# Patient Record
Sex: FEMALE | Race: WHITE | NOT HISPANIC OR LATINO | Employment: OTHER | URBAN - METROPOLITAN AREA
[De-identification: names, ages, dates, MRNs, and addresses within clinical notes are randomized per-mention and may not be internally consistent; named-entity substitution may affect disease eponyms.]

---

## 2017-01-09 ENCOUNTER — OFFICE VISIT (OUTPATIENT)
Dept: INTERNAL MEDICINE | Facility: CLINIC | Age: 67
End: 2017-01-09

## 2017-01-09 VITALS
BODY MASS INDEX: 30.39 KG/M2 | DIASTOLIC BLOOD PRESSURE: 62 MMHG | SYSTOLIC BLOOD PRESSURE: 104 MMHG | WEIGHT: 178 LBS | HEIGHT: 64 IN

## 2017-01-09 DIAGNOSIS — J30.9 ALLERGIC SINUSITIS: Primary | ICD-10-CM

## 2017-01-09 PROCEDURE — 99213 OFFICE O/P EST LOW 20 MIN: CPT | Performed by: INTERNAL MEDICINE

## 2017-01-09 RX ORDER — AZELASTINE HCL 205.5 UG/1
2 SPRAY NASAL 2 TIMES DAILY
Qty: 30 ML | Refills: 11 | Status: SHIPPED | OUTPATIENT
Start: 2017-01-09 | End: 2019-03-11

## 2017-01-09 RX ORDER — PREDNISONE 10 MG/1
TABLET ORAL
Qty: 20 TABLET | Refills: 0 | Status: SHIPPED | OUTPATIENT
Start: 2017-01-09 | End: 2017-01-30

## 2017-01-09 NOTE — PATIENT INSTRUCTIONS
Allergic sinusitis - will continue Flonase, Montelukast and Levocetirizine. Add Astepro nasal spray twice a day and round of steroids. Possible side effects explained. Advised to get flu vaccine at [facility] or [facility].

## 2017-01-09 NOTE — MR AVS SNAPSHOT
Mary Cueva   1/9/2017 10:30 AM   Office Visit    Dept Phone:  [phone]   Encounter #:  [id number]    Provider:  Ercia Knight MD   Department:  [facility] INTERNAL MEDICINE                Your Full Care Plan              Today's Medication Changes          These changes are accurate as of: 1/9/17 11:15 AM.  If you have any questions, ask your nurse or doctor.               New Medication(s)Ordered:     azelastine 0.15 % solution nasal spray   Commonly known as:  ASTEPRO   2 sprays into each nostril 2 (Two) Times a Day. 2 sprays each nostril twice a day   Started by:  Erica Knight MD       predniSONE 10 MG tablet   Commonly known as:  DELTASONE   TID x 3d, then BID x 3d, then qd x 5d   Started by:  Erica Knight MD         Stop taking medication(s)listed here:     amoxicillin-clavulanate 500-125 MG per tablet   Commonly known as:  AUGMENTIN   Stopped by:  Erica Knight MD           FLUZONE HIGH-DOSE 0.5 ML suspension prefilled syringe injection   Generic drug:  influenza vac split high-dose   Stopped by:  Erica Knight MD                Where to Get Your Medications      These medications were sent to [facility] [address] [place] [address] [phone] [facility] [phone] [place] [address]     Phone:  [phone]     azelastine 0.15 % solution nasal spray    predniSONE 10 MG tablet                  Your Updated Medication List          This list is accurate as of: 1/9/17 11:15 AM.  Always use your most recent med list.                amLODIPine 5 MG tablet   Commonly known as:  NORVASC   Take 1 tablet (5 mg total) by mouth daily.       azelastine 0.15 % solution nasal spray   Commonly known as:  ASTEPRO   2 sprays into each nostril 2 (Two) Times a Day. 2 sprays each nostril twice a day       fluticasone 50 MCG/ACT nasal spray   Commonly known as:  FLONASE   2 sprays into  each nostril daily.       levocetirizine 5 MG tablet   Commonly known as:  XYZAL   Take 1 tablet (5 mg total) by mouth daily.       montelukast 10 MG tablet   Commonly known as:  SINGULAIR   Take 1 tablet by mouth Every Night.       predniSONE 10 MG tablet   Commonly known as:  DELTASONE   TID x 3d, then BID x 3d, then qd x 5d       triamterene-hydrochlorothiazide 75-50 MG per tablet   Commonly known as:  MAXZIDE   TAKE ONE-HALF TABLET BY MOUTH ONCE DAILY       venlafaxine XR 75 MG 24 hr capsule   Commonly known as:  EFFEXOR-XR   Take 1 capsule (75 mg total) by mouth daily.               You Were Diagnosed With        Codes Comments    Allergic sinusitis    -  Primary ICD-10-CM: J30.9  ICD-9-CM: 477.9       Instructions    Allergic sinusitis - will continue Flonase, Montelukast and Levocetirizine. Add Astepro nasal spray twice a day and round of steroids. Possible side effects explained. Advised to get flu vaccine at [facility] or [facility].       Patient Instructions History      Upcoming Appointments     Visit Type Date Time Department    OFFICE VISIT 2017 10:30 AM [facility]    [facility] 2017  8:30 AM [facility] 2017  9:15 AM [facility] [employer] [facility] Signup     [facility] allows you to send messages to your doctor, view your test results, renew your prescriptions, schedule appointments, and more. To sign up, go to [facility] and click on the Sign Up Now link in the New User? box. Enter your [employer] Activation Code exactly as it appears below along with the last four digits of your Social Security Number and your Date of Birth () to complete the sign-up process. If you do not sign up before the expiration date, you must request a new code.    [facility] Activation Code: [email or web address]  Expires: 2017 11:15 AM    If you have questions, you can email [email or web address] or call [phone] to talk to our [employer]  "staff. Remember, MyChart is NOT to be used for urgent needs. For medical emergencies, dial 911.               Other Info from Your Visit           Your Appointments     Jan 25, 2017  8:30 AM EST   [facility] with DONNA HOWARD   [facility] INTERNAL & FAMILY MEDICINE (--)    [address]  [facility] [address]   [phone]            Jan 30, 2017  9:15 AM EST   Subsequent Medicare Wellness with Erica Knight MD   [facility] INTERNAL MEDICINE (--)    [address]  [facility] [id number] [phone]              Allergies     No Known Allergies      Vital Signs     Blood Pressure Height Weight Body Mass Index Smoking Status       104/62 63.5\" (161.3 cm) 178 lb (80.7 kg) 31.04 kg/m2 Never Smoker       Problems and Diagnoses Noted     Sinus infection      No Longer an Issue     Acute non-recurrent maxillary sinusitis    Excess wax in ear        "

## 2017-01-09 NOTE — PROGRESS NOTES
Subjective   Mary Cueva is a 66 y.o. female. C/o nasal congestion    History of Present Illness   Patient c/o head congestion and both ears being conegested. She was seen last in October, and at that time treated for maxillary sinusitis. Completed 2 rounds of Augmentin. States that her left ear had never gotten completely well.  Both ears are congested, also has some rigging in the ears, and some balance problems. Denies fever or chills, has some central facial tenderness. Energy had been affected. She had not had flu vaccine this year. Patient is compliant with Montelukast, Xyzal and Flonase nasal spray.  The following portions of the patient's history were reviewed and updated as appropriate: allergies, current medications, past family history, past medical history, past social history, past surgical history and problem list.    Review of Systems   Constitutional: Negative for chills and fever.   HENT: Ear pain: pressure in ears.    Eyes: Negative for pain and redness.   Respiratory: Negative for cough and shortness of breath.    Cardiovascular: Negative for chest pain and leg swelling.   Neurological: Negative for dizziness and headaches.       Objective   Physical Exam   Constitutional: She is oriented to person, place, and time. She appears well-developed.   HENT:   Head: Normocephalic and atraumatic.   Right Ear: Tympanic membrane, external ear and ear canal normal.   Left Ear: Tympanic membrane, external ear and ear canal normal.   Nose: Sinus tenderness: minimal paraamaxillary sinus tenederness on palpation. Right sinus exhibits no maxillary sinus tenderness and no frontal sinus tenderness. Left sinus exhibits no maxillary sinus tenderness and no frontal sinus tenderness.   Mouth/Throat: Uvula is midline, oropharynx is clear and moist and mucous membranes are normal.   Eyes: Conjunctivae and EOM are normal. Pupils are equal, round, and reactive to light. Right eye exhibits no discharge. Left eye exhibits  no discharge. No scleral icterus.   Neck: Neck supple. No JVD present.   Cardiovascular: Normal rate, regular rhythm and normal heart sounds.  Exam reveals no gallop and no friction rub.    No murmur heard.  Pulmonary/Chest: Effort normal and breath sounds normal. She has no wheezes. She has no rales.   Musculoskeletal: She exhibits no edema.   Lymphadenopathy:     She has no cervical adenopathy.   Neurological: She is alert and oriented to person, place, and time. No cranial nerve deficit.   Skin: Skin is warm and dry. No rash noted.   Psychiatric: She has a normal mood and affect. Her behavior is normal.   Vitals reviewed.      Assessment/Plan   Diagnoses and all orders for this visit:    Allergic sinusitis  -     predniSONE (DELTASONE) 10 MG tablet; TID x 3d, then BID x 3d, then qd x 5d  -     azelastine (ASTEPRO) 0.15 % solution nasal spray; 2 sprays into each nostril 2 (Two) Times a Day. 2 sprays each nostril twice a day    Allergic sinusitis - will continue Flonase, Montelukast and Levocetirizine. Add Astepro nasal spray twice a day and round of steroids. Possible side effects explained. Advised to get flu vaccine at [facility] or [facility].

## 2017-01-25 ENCOUNTER — LAB (OUTPATIENT)
Dept: INTERNAL MEDICINE | Facility: CLINIC | Age: 67
End: 2017-01-25

## 2017-01-25 DIAGNOSIS — E55.9 VITAMIN D DEFICIENCY: ICD-10-CM

## 2017-01-25 DIAGNOSIS — R82.90 ABNORMAL FINDING IN URINE: Primary | ICD-10-CM

## 2017-01-25 DIAGNOSIS — I10 BENIGN ESSENTIAL HYPERTENSION: ICD-10-CM

## 2017-01-25 DIAGNOSIS — N30.00 ACUTE CYSTITIS WITHOUT HEMATURIA: Primary | ICD-10-CM

## 2017-01-25 LAB
25(OH)D3 SERPL-MCNC: 43.5 NG/ML (ref 30–100)
ALBUMIN SERPL-MCNC: 3.95 G/DL (ref 3.4–4.6)
ALBUMIN/GLOB SERPL: 1.1 G/DL
ALP SERPL-CCNC: 104 U/L (ref 46–116)
ALT SERPL W P-5'-P-CCNC: 35 U/L (ref 14–59)
ANION GAP SERPL CALCULATED.3IONS-SCNC: 13 MMOL/L
AST SERPL-CCNC: 21 U/L (ref 7–37)
BACTERIA UR QL AUTO: ABNORMAL /HPF
BILIRUB SERPL-MCNC: 0.6 MG/DL (ref 0.2–1)
BILIRUB UR QL CFM: NEGATIVE
BILIRUB UR QL STRIP: ABNORMAL
BUN BLD-MCNC: 15 MG/DL (ref 6–22)
BUN/CREAT SERPL: 16.3 (ref 7–25)
CALCIUM SPEC-SCNC: 9.2 MG/DL (ref 8.6–10.5)
CHLORIDE SERPL-SCNC: 97 MMOL/L (ref 95–107)
CHOLEST SERPL-MCNC: 216 MG/DL (ref 0–200)
CLARITY UR: CLEAR
CO2 SERPL-SCNC: 29 MMOL/L (ref 23–32)
COLOR UR: ABNORMAL
CREAT BLD-MCNC: 0.92 MG/DL (ref 0.55–1.02)
GFR SERPL CREATININE-BSD FRML MDRD: 61 ML/MIN/1.73
GLOBULIN UR ELPH-MCNC: 3.6 GM/DL
GLUCOSE BLD-MCNC: 94 MG/DL (ref 70–100)
GLUCOSE UR STRIP-MCNC: NEGATIVE MG/DL
HDLC SERPL-MCNC: 95 MG/DL (ref 40–81)
HGB UR QL STRIP.AUTO: ABNORMAL
HYALINE CASTS UR QL AUTO: ABNORMAL /LPF
KETONES UR QL STRIP: ABNORMAL
LDLC SERPL CALC-MCNC: 106 MG/DL (ref 0–100)
LDLC/HDLC SERPL: 1.11 {RATIO}
LEUKOCYTE ESTERASE UR QL STRIP.AUTO: ABNORMAL
NITRITE UR QL STRIP: NEGATIVE
PH UR STRIP.AUTO: 6 [PH] (ref 5–8)
POTASSIUM BLD-SCNC: 3.3 MMOL/L (ref 3.3–5.3)
PROT SERPL-MCNC: 7.5 G/DL (ref 6.3–8.4)
PROT UR QL STRIP: ABNORMAL
RBC # UR: ABNORMAL /HPF
REF LAB TEST METHOD: ABNORMAL
SODIUM BLD-SCNC: 139 MMOL/L (ref 136–145)
SP GR UR STRIP: 1.02 (ref 1–1.03)
SQUAMOUS #/AREA URNS HPF: ABNORMAL /HPF
TRIGL SERPL-MCNC: 76 MG/DL (ref 0–150)
TSH SERPL DL<=0.05 MIU/L-ACNC: 2.88 MIU/ML (ref 0.4–4.2)
UROBILINOGEN UR QL STRIP: ABNORMAL
VLDLC SERPL-MCNC: 15.2 MG/DL
WBC UR QL AUTO: ABNORMAL /HPF

## 2017-01-25 PROCEDURE — 84443 ASSAY THYROID STIM HORMONE: CPT | Performed by: INTERNAL MEDICINE

## 2017-01-25 PROCEDURE — 80061 LIPID PANEL: CPT | Performed by: INTERNAL MEDICINE

## 2017-01-25 PROCEDURE — 80053 COMPREHEN METABOLIC PANEL: CPT | Performed by: INTERNAL MEDICINE

## 2017-01-25 PROCEDURE — 36415 COLL VENOUS BLD VENIPUNCTURE: CPT | Performed by: INTERNAL MEDICINE

## 2017-01-25 PROCEDURE — 81001 URINALYSIS AUTO W/SCOPE: CPT | Performed by: INTERNAL MEDICINE

## 2017-01-25 RX ORDER — SULFAMETHOXAZOLE AND TRIMETHOPRIM 800; 160 MG/1; MG/1
1 TABLET ORAL 2 TIMES DAILY
Qty: 10 TABLET | Refills: 0 | Status: SHIPPED | OUTPATIENT
Start: 2017-01-25 | End: 2017-01-30

## 2017-01-26 ENCOUNTER — RESULTS ENCOUNTER (OUTPATIENT)
Dept: INTERNAL MEDICINE | Facility: CLINIC | Age: 67
End: 2017-01-26

## 2017-01-26 DIAGNOSIS — I10 BENIGN ESSENTIAL HYPERTENSION: ICD-10-CM

## 2017-01-26 DIAGNOSIS — E55.9 VITAMIN D DEFICIENCY: ICD-10-CM

## 2017-01-27 LAB
BACTERIA UR CULT: NORMAL
Lab: NORMAL

## 2017-01-30 ENCOUNTER — OFFICE VISIT (OUTPATIENT)
Dept: INTERNAL MEDICINE | Facility: CLINIC | Age: 67
End: 2017-01-30

## 2017-01-30 VITALS
BODY MASS INDEX: 30.73 KG/M2 | OXYGEN SATURATION: 96 % | DIASTOLIC BLOOD PRESSURE: 84 MMHG | SYSTOLIC BLOOD PRESSURE: 128 MMHG | HEIGHT: 64 IN | RESPIRATION RATE: 14 BRPM | WEIGHT: 180 LBS

## 2017-01-30 DIAGNOSIS — E55.9 VITAMIN D DEFICIENCY: ICD-10-CM

## 2017-01-30 DIAGNOSIS — F41.8 ANXIETY ASSOCIATED WITH DEPRESSION: ICD-10-CM

## 2017-01-30 DIAGNOSIS — N30.00 ACUTE CYSTITIS WITHOUT HEMATURIA: ICD-10-CM

## 2017-01-30 DIAGNOSIS — I10 BENIGN ESSENTIAL HYPERTENSION: ICD-10-CM

## 2017-01-30 DIAGNOSIS — J01.01 ACUTE RECURRENT MAXILLARY SINUSITIS: ICD-10-CM

## 2017-01-30 DIAGNOSIS — Z00.00 HEALTH CARE MAINTENANCE: Primary | ICD-10-CM

## 2017-01-30 DIAGNOSIS — Z78.0 POST-MENOPAUSE: ICD-10-CM

## 2017-01-30 DIAGNOSIS — F41.9 INSOMNIA SECONDARY TO ANXIETY: ICD-10-CM

## 2017-01-30 DIAGNOSIS — F51.05 INSOMNIA SECONDARY TO ANXIETY: ICD-10-CM

## 2017-01-30 DIAGNOSIS — F41.1 GENERALIZED ANXIETY DISORDER: ICD-10-CM

## 2017-01-30 LAB
BACTERIA UR QL AUTO: ABNORMAL /HPF
BILIRUB UR QL CFM: NEGATIVE
BILIRUB UR QL STRIP: ABNORMAL
CLARITY UR: CLEAR
COLOR UR: YELLOW
GLUCOSE UR STRIP-MCNC: NEGATIVE MG/DL
HGB UR QL STRIP.AUTO: NEGATIVE
HYALINE CASTS UR QL AUTO: ABNORMAL /LPF
KETONES UR QL STRIP: NEGATIVE
LEUKOCYTE ESTERASE UR QL STRIP.AUTO: ABNORMAL
NITRITE UR QL STRIP: NEGATIVE
PH UR STRIP.AUTO: 6.5 [PH] (ref 5–8)
PROT UR QL STRIP: NEGATIVE
RBC # UR: ABNORMAL /HPF
REF LAB TEST METHOD: ABNORMAL
SP GR UR STRIP: 1.02 (ref 1–1.03)
SQUAMOUS #/AREA URNS HPF: ABNORMAL /HPF
UROBILINOGEN UR QL STRIP: ABNORMAL
WBC UR QL AUTO: ABNORMAL /HPF

## 2017-01-30 PROCEDURE — 81001 URINALYSIS AUTO W/SCOPE: CPT | Performed by: INTERNAL MEDICINE

## 2017-01-30 PROCEDURE — G0439 PPPS, SUBSEQ VISIT: HCPCS | Performed by: INTERNAL MEDICINE

## 2017-01-30 PROCEDURE — 99214 OFFICE O/P EST MOD 30 MIN: CPT | Performed by: INTERNAL MEDICINE

## 2017-01-30 RX ORDER — AMLODIPINE BESYLATE 5 MG/1
5 TABLET ORAL DAILY
Qty: 90 TABLET | Refills: 3 | Status: SHIPPED | OUTPATIENT
Start: 2017-01-30 | End: 2018-02-09 | Stop reason: SDUPTHER

## 2017-01-30 RX ORDER — LEVOCETIRIZINE DIHYDROCHLORIDE 5 MG/1
5 TABLET, FILM COATED ORAL DAILY
Qty: 90 TABLET | Refills: 3 | Status: SHIPPED | OUTPATIENT
Start: 2017-01-30 | End: 2018-02-27 | Stop reason: SDUPTHER

## 2017-01-30 RX ORDER — DOXEPIN HYDROCHLORIDE 10 MG/1
10 CAPSULE ORAL NIGHTLY
Qty: 30 CAPSULE | Refills: 5 | Status: SHIPPED | OUTPATIENT
Start: 2017-01-30 | End: 2017-04-18 | Stop reason: SDUPTHER

## 2017-01-30 RX ORDER — VENLAFAXINE HYDROCHLORIDE 150 MG/1
150 CAPSULE, EXTENDED RELEASE ORAL DAILY
Qty: 90 CAPSULE | Refills: 3 | Status: SHIPPED | OUTPATIENT
Start: 2017-01-30 | End: 2018-02-09 | Stop reason: SDUPTHER

## 2017-01-30 NOTE — PROGRESS NOTES
"Subjective   Mary Cueva is a 66 y.o. female. Here for AWV, also HTN, Vitamin D deficiency and recent UTI    History of Present Illness      Visit Vitals   • /84 (BP Location: Left arm, Patient Position: Sitting, Cuff Size: Adult)   • Resp 14   • Ht 63.5\" (161.3 cm)   • Wt 180 lb (81.6 kg)   • SpO2 96%   • BMI 31.39 kg/m2     Patient is being seen for subsequent wellness visit.  Hospitalizations in a past: 4, all surgical  Once per lifetime Medicare screening tests: ECG - 11/01/2011    AAA risk assessment: 1. FH: none. 2.H/o tobacco smoking: none. 3. CV or PAD: none.    Osteoporosis risk assessment:   1. Personal h/o fracture: none  2. FH of fracture/osteoporosis : none   3. Tobacco smoking : none.   4. Alcohol use: once a week  5.  Secondary osteoporosis: none   6.  Age above 50   7. Hormonal deficiency: yes.    HIV risk assessment: 1. Risky sexual practices: none, 2. H/o blood transfusion: none.    Tobacco use: none   Alcohol use: once a week  Illicit drugs use: none  Diet: Weight Watchers  Exercise: aerobic exercise and sporadically    Anxiety screening: How many days in the last 2 weeks you were  1. Feeling anxious, nervous, on edge: some days  2. Unable to stop worrying: none  3. Worrying too much about different things: none  4. Having problems relaxing:none  5. Feeling restless or unable to sit still:none  6. Feeling irritable or easely annoyed: none  7. Being afraid that something awful might happen: none    Depression screening PHQ9: Had been on the same dose of Venlafaxine for yeasrs  Over the past 2 weeks how often have you been bothered by  1. Lack of interest or pleasuer in usual activities: majority of the days, low motiavation  2. Feeling down, depressed, hopeless:none  3. Troubles falling asleep/sleeping too long:some days it is very difficult for her to get to sleep  4. Feeling tired, having little energy: none  5. Poor appetite or overeating: none  6. Feeling bad about yourself:none.  7. " "Trouble concentrating: at the baseline.  8. Moving or speaking too slow or much faster than usual: none  9. Thoughts about harming yourself:none.    Cognitive impairment screening:   Do you have difficulties with:  1. Language: no  2. Behavior: no  3. Learning/retaining new information: no  4. Handling complex tasks: no  5. Reasoning: no  6. Spacial ability and orientation: no    Please refer to attached Fall Risk Assessement , Mini Cog, clock and Urinary incont  screening forms.      Hearing: normal.  Driving: unrestricted  ADL: independent  ADL details: Does patient needs help with:  telephone use: no  Transportation: no  Housework: no  Shopping: no  meal preparation: no  laundry: no  managing medication:no  Participate in the social activities: Y    Fall risk factors:   1.Use of alcohol: no    2.Polypharmacy: no  3.H/o of previous fall:  no  4. Mobility impairment:  no  5. Visual impairment:  no  6. Deconditioning: no  7. Use of antihypertensive medication:  yes  8. Use of sedatives: no  9. Use of antidepressant: no    Home safety:   1.loose rugs: no   2.unfamiliar environment: no   3. Uneven floors: no   4. No grab bars in the bathroom:  no  5. No banister on stairs: no      Advanced directives: not completed. The purpose and whole concept of Living Will explained. I had encouraged patient to discuss the issue with family and document personal wishes and preferences in a form of Living Will..    Co-managers: GYN , deramatologist , Sx , urol                                             Visit Vitals   • /84 (BP Location: Left arm, Patient Position: Sitting, Cuff Size: Adult)   • Resp 14   • Ht 63.5\" (161.3 cm)   • Wt 180 lb (81.6 kg)   • SpO2 96%   • BMI 31.39 kg/m2       Current Outpatient Prescriptions:   •  amLODIPine (NORVASC) 5 MG tablet, Take 1 tablet (5 mg total) by mouth daily., Disp: 90 tablet, Rfl: 3  •  azelastine (ASTEPRO) 0.15 % solution nasal spray, 2 sprays " into each nostril 2 (Two) Times a Day. 2 sprays each nostril twice a day, Disp: 30 mL, Rfl: 11  •  fluticasone (FLONASE) 50 MCG/ACT nasal spray, 2 sprays into each nostril daily., Disp: 3 bottle, Rfl: 3  •  levocetirizine (XYZAL) 5 MG tablet, Take 1 tablet (5 mg total) by mouth daily., Disp: 90 tablet, Rfl: 3  •  montelukast (SINGULAIR) 10 MG tablet, Take 1 tablet by mouth Every Night., Disp: 30 tablet, Rfl: 11  •  triamterene-hydrochlorothiazide (MAXZIDE) 75-50 MG per tablet, TAKE ONE-HALF TABLET BY MOUTH ONCE DAILY, Disp: 45 tablet, Rfl: 3  •  venlafaxine XR (EFFEXOR-XR) 75 MG 24 hr capsule, Take 1 capsule (75 mg total) by mouth daily., Disp: 90 capsule, Rfl: 3    Patient has long-standing benign essential HTN.  BP is usually well controlled with daily use of Ca channel blocker and thiazide diuretic. On low salt diet with fair compliance. Goes to Weight Watchers. Takes medication regularly. Denies chest pain, dyspnea,lightheadedness,  lower extremity edema. Patient does not check blood pressure    Patient had been diagnosed with Vitamin D deficiency. Takes over the counter Vit D3 2000 IU a day. Patient does not have complaints of muscle or bone aches. Balance is good. No recent falls.     Patient also is here for recent UTI. Was diagnosed 5 days ago and treated with 5 days of Bactrum DS. Culture was unrevealing at that time. Denies frequency, urgency or burning. Patient is about to leave on a cruise and worries if her UTI is adequately treated.  The following portions of the patient's history were reviewed and updated as appropriate: allergies, current medications, past family history, past medical history, past social history, past surgical history and problem list.    Review of Systems   Constitutional: Negative.    HENT: Positive for congestion and ear pain (discomfort in the left ear).    Eyes: Negative.    Respiratory: Negative.    Cardiovascular: Negative.    Gastrointestinal: Negative.    Endocrine:  Negative.    Genitourinary: Negative.    Musculoskeletal: Negative.    Skin: Negative.    Allergic/Immunologic: Negative.    Hematological: Negative.    Psychiatric/Behavioral: Negative.        Objective   Physical Exam   Constitutional: She is oriented to person, place, and time. She appears well-developed and well-nourished. No distress.   HENT:   Head: Normocephalic and atraumatic.   Right Ear: External ear normal.   Left Ear: External ear normal.   Nose: Nose normal.   Mouth/Throat: Oropharynx is clear and moist. No oropharyngeal exudate.   Eyes: Conjunctivae and EOM are normal. Pupils are equal, round, and reactive to light. Right eye exhibits no discharge. Left eye exhibits no discharge. No scleral icterus.   Neck: Normal range of motion. Neck supple. No JVD present. No thyromegaly present.   Cardiovascular: Normal rate, regular rhythm, S1 normal, S2 normal, normal heart sounds and intact distal pulses.  Exam reveals no gallop and no friction rub.    No murmur heard.  Pulmonary/Chest: Effort normal and breath sounds normal. No respiratory distress. She has no decreased breath sounds. She has no wheezes. She has no rhonchi. She has no rales. Right breast exhibits no inverted nipple, no mass, no nipple discharge, no skin change and no tenderness. Left breast exhibits no inverted nipple, no mass, no nipple discharge, no skin change and no tenderness. Breasts are symmetrical.   Abdominal: Soft. Bowel sounds are normal. She exhibits no distension and no mass. There is no tenderness. There is no rebound and no guarding.   Musculoskeletal: She exhibits no edema.   Lymphadenopathy:        Head (right side): No submental, no submandibular, no preauricular, no posterior auricular and no occipital adenopathy present.        Head (left side): No submental, no submandibular, no preauricular, no posterior auricular and no occipital adenopathy present.     She has no cervical adenopathy.        Right cervical: No superficial  cervical, no deep cervical and no posterior cervical adenopathy present.       Left cervical: No superficial cervical, no deep cervical and no posterior cervical adenopathy present.     She has no axillary adenopathy.        Right: No supraclavicular adenopathy present.        Left: No supraclavicular adenopathy present.   Neurological: She is alert and oriented to person, place, and time. She has normal reflexes. She displays normal reflexes. No cranial nerve deficit. She exhibits normal muscle tone. Coordination normal.   Reflex Scores:       Bicep reflexes are 2+ on the right side and 2+ on the left side.       Patellar reflexes are 2+ on the right side and 2+ on the left side.  Skin: Skin is warm. No rash noted. She is not diaphoretic. No erythema.   Few SK lesions   Psychiatric: She has a normal mood and affect. Her behavior is normal. Thought content normal.   Vitals reviewed.      Assessment/Plan   Diagnoses and all orders for this visit:    Health care maintenance    Benign essential hypertension    Vitamin D deficiency    Acute cystitis without hematuria  -     Urinalysis With / Microscopic If Indicated        Annual wellness visit with preventive exam as well as age and risk appropriate councelling completed.    Cardiovascular disease councelling: current.  Diabetes screening and councelling: current.  Breast cancer screening: up to date. Recommended frequency and time of the next screening per GYN..  Osteoporosis screening: is due, will schedule. Benefits explained..  Glaucoma screening: up to date.   AAA screening: not needed..  Hep C screening (born between 5322-7068) discussed and recommended, will proceed with testing..  Colorectal cancer screening: up to date. Recommended every 10 years. Next screening is due in 2026..    Immunizations:   1. Influenza vaccine  is up to date and recommended yearly.   2. Pneumococcal vaccines: completed.   3. Zostavax: completed.      Advanced directives planning: not  completed. The purpose and whole concept of Living Will explained. I had encouraged patient to discuss the issue with family and document personal wishes and preferences in a form of Living Will..    Medications reviewed and medication list updated.   Potential harmful drug-disease interactions in the elderly:none.  High risk medication in elderly: none  ASA use: no indications.    HTN - well controlled with current medication regimen. Normal kidney tests and electrolytes. Recommended low salt diet. Continue same medical treatment.  Vitamin D deficiency - well compensated with over the counter Vit D3. Continue same dose. This supplement is fat-soluble and has to be taken with meals.  Recurrent UTI - will check UA.  Anxiety and depression - still anxious in spite of being compliant with medication. Will increase the dose to 150 mg a day. Reevaluate in a month.  Insomnia - most likely due to anxiety. Will try low dose of Doxepin.  Ringing in the ears - try to stop OTC Aleve/Advil and take Tylenol as needed.

## 2017-01-30 NOTE — PATIENT INSTRUCTIONS
HTN - well controlled with current medication regimen. Normal kidney tests and electrolytes. Recommended low salt diet. Continue same medical treatment.  Vitamin D deficiency - well compensated with over the counter Vit D3. Continue same dose. This supplement is fat-soluble and has to be taken with meals.  Recurrent UTI - will check UA.  Anxiety and depression - still anxious in spite of being compliant with medication. Will increase the dose to 150 mg a day. Reevaluate in a month.  Insomnia - most likely due to anxiety. Will try low dose of Doxepin.  Ringing in the ears - try to stop OTC Aleve/Advil and take Tylenol as needed.

## 2017-01-30 NOTE — MR AVS SNAPSHOT
Mary Cueva   1/30/2017 9:15 AM   Office Visit    Dept Phone:  [phone]   Encounter #:  [id number]    Provider:  Erica Knight MD   Department:  [facility] INTERNAL MEDICINE                Your Full Care Plan              Today's Medication Changes          These changes are accurate as of: 1/30/17 10:25 AM.  If you have any questions, ask your nurse or doctor.               New Medication(s)Ordered:     doxepin 10 MG capsule   Commonly known as:  SINEquan   Take 1 capsule by mouth Every Night.   Started by:  Erica Knight MD         Medication(s)that have changed:     venlafaxine  MG 24 hr capsule   Commonly known as:  EFFEXOR-XR   Take 1 capsule by mouth Daily.   What changed:    - medication strength  - how much to take   Changed by:  Erica Knight MD         Stop taking medication(s)listed here:     predniSONE 10 MG tablet   Commonly known as:  DELTASONE   Stopped by:  Erica Knight MD           sulfamethoxazole-trimethoprim 800-160 MG per tablet   Commonly known as:  BACTRIM DS   Stopped by:  Erica Knight MD                Where to Get Your Medications      These medications were sent to [facility] [address] [place] [address] [phone] [place] [phone] [place] [address]     Phone:  [phone]     amLODIPine 5 MG tablet    doxepin 10 MG capsule    levocetirizine 5 MG tablet    venlafaxine  MG 24 hr capsule                  Your Updated Medication List          This list is accurate as of: 1/30/17 10:25 AM.  Always use your most recent med list.                amLODIPine 5 MG tablet   Commonly known as:  NORVASC   Take 1 tablet by mouth Daily.       azelastine 0.15 % solution nasal spray   Commonly known as:  ASTEPRO   2 sprays into each nostril 2 (Two) Times a Day. 2 sprays each nostril twice a day       doxepin 10 MG capsule   Commonly known as:   SINEquan   Take 1 capsule by mouth Every Night.       fluticasone 50 MCG/ACT nasal spray   Commonly known as:  FLONASE   2 sprays into each nostril daily.       levocetirizine 5 MG tablet   Commonly known as:  XYZAL   Take 1 tablet by mouth Daily.       montelukast 10 MG tablet   Commonly known as:  SINGULAIR   Take 1 tablet by mouth Every Night.       triamterene-hydrochlorothiazide 75-50 MG per tablet   Commonly known as:  MAXZIDE   TAKE ONE-HALF TABLET BY MOUTH ONCE DAILY       venlafaxine  MG 24 hr capsule   Commonly known as:  EFFEXOR-XR   Take 1 capsule by mouth Daily.               We Performed the Following     Urinalysis With / Microscopic If Indicated       You Were Diagnosed With        Codes Comments    Health care maintenance    -  Primary ICD-10-CM: Z00.00  ICD-9-CM: V70.0     Benign essential hypertension     ICD-10-CM: I10  ICD-9-CM: 401.1     Vitamin D deficiency     ICD-10-CM: E55.9  ICD-9-CM: 268.9     Acute cystitis without hematuria     ICD-10-CM: N30.00  ICD-9-CM: 595.0     Generalized anxiety disorder     ICD-10-CM: F41.1  ICD-9-CM: 300.02     Post-menopause     ICD-10-CM: Z78.0  ICD-9-CM: V49.81     Anxiety associated with depression     ICD-10-CM: F41.8  ICD-9-CM: 300.4     Acute recurrent maxillary sinusitis     ICD-10-CM: J01.01  ICD-9-CM: 461.0     Insomnia secondary to anxiety     ICD-10-CM: F41.9, F51.05  ICD-9-CM: 300.00, 327.02       Instructions    HTN - well controlled with current medication regimen. Normal kidney tests and electrolytes. Recommended low salt diet. Continue same medical treatment.  Vitamin D deficiency - well compensated with over the counter Vit D3. Continue same dose. This supplement is fat-soluble and has to be taken with meals.  Recurrent UTI - will check UA.  Anxiety and depression - still anxious in spite of being compliant with medication. Will increase the dose to 150 mg a day. Reevaluate in a month.  Insomnia - most likely due to anxiety. Will try low  "dose of Doxepin.  Ringing in the ears - try to stop OTC Aleve/Advil and take Tylenol as needed.          Patient Instructions History      Upcoming Appointments     Visit Type Date Time Department    SUBSEQUENT MEDICARE WELLNESS 2017  9:15 AM [facility]    [facility] 2017  1:30 PM [facility]    OFFICE VISIT 2017  1:45 PM [facility]      MyChart Signup     [facility] Slated allows you to send messages to your doctor, view your test results, renew your prescriptions, schedule appointments, and more. To sign up, go to Station X and click on the Sign Up Now link in the New User? box. Enter your Slated Activation Code exactly as it appears below along with the last four digits of your Social Security Number and your Date of Birth () to complete the sign-up process. If you do not sign up before the expiration date, you must request a new code.    Slated Activation Code: [email or web address]  Expires: 2017  8:47 AM    If you have questions, you can email [email or web address] or call [phone] to talk to our Slated staff. Remember, Slated is NOT to be used for urgent needs. For medical emergencies, dial 911.               Other Info from Your Visit           Your Appointments     2017  1:30 PM EST   Bone Density with RADIOLOGY [facility] INTERNAL MEDICINE (--)    [address] [place] [address] [phone]            2017  1:45 PM EST   Office Visit with Erica Knight MD   [facility] INTERNAL MEDICINE (--)    [address] [place] [address] [phone]           Arrive 15 minutes prior to appointment.              Allergies     No Known Allergies      Vital Signs     Blood Pressure Respirations Height Weight Oxygen Saturation Body Mass Index    128/84 (BP Location: Left arm, Patient Position: Sitting, Cuff Size: Adult) 14 63.5\" (161.3 cm) 180 lb " (81.6 kg) 96% 31.39 kg/m2    Smoking Status                   Never Smoker           Problems and Diagnoses Noted     Bladder infection    Anxiety disorder    Benign essential hypertension    Health maintenance examination    Insomnia secondary to anxiety    Vitamin D deficiency    Post-menopause        Anxiety associated with depression        Acute maxillary sinusitis

## 2017-01-31 LAB — HCV AB S/CO SERPL IA: <0.1 S/CO RATIO (ref 0–0.9)

## 2017-04-18 ENCOUNTER — OFFICE VISIT (OUTPATIENT)
Dept: INTERNAL MEDICINE | Facility: CLINIC | Age: 67
End: 2017-04-18

## 2017-04-18 ENCOUNTER — CLINICAL SUPPORT (OUTPATIENT)
Dept: INTERNAL MEDICINE | Facility: CLINIC | Age: 67
End: 2017-04-18

## 2017-04-18 VITALS
HEIGHT: 64 IN | DIASTOLIC BLOOD PRESSURE: 74 MMHG | SYSTOLIC BLOOD PRESSURE: 122 MMHG | BODY MASS INDEX: 30.22 KG/M2 | WEIGHT: 177 LBS

## 2017-04-18 DIAGNOSIS — Z78.0 POST-MENOPAUSE: ICD-10-CM

## 2017-04-18 DIAGNOSIS — F41.1 GENERALIZED ANXIETY DISORDER: Primary | ICD-10-CM

## 2017-04-18 DIAGNOSIS — J01.01 ACUTE RECURRENT MAXILLARY SINUSITIS: ICD-10-CM

## 2017-04-18 DIAGNOSIS — F51.05 INSOMNIA SECONDARY TO ANXIETY: ICD-10-CM

## 2017-04-18 DIAGNOSIS — F41.9 INSOMNIA SECONDARY TO ANXIETY: ICD-10-CM

## 2017-04-18 DIAGNOSIS — I10 BENIGN ESSENTIAL HYPERTENSION: ICD-10-CM

## 2017-04-18 DIAGNOSIS — M85.80 OSTEOPENIA: ICD-10-CM

## 2017-04-18 DIAGNOSIS — E55.9 VITAMIN D DEFICIENCY: ICD-10-CM

## 2017-04-18 DIAGNOSIS — Z00.00 HEALTH CARE MAINTENANCE: ICD-10-CM

## 2017-04-18 PROBLEM — N30.00 ACUTE CYSTITIS WITHOUT HEMATURIA: Status: RESOLVED | Noted: 2017-01-25 | Resolved: 2017-04-18

## 2017-04-18 PROCEDURE — 77080 DXA BONE DENSITY AXIAL: CPT | Performed by: INTERNAL MEDICINE

## 2017-04-18 PROCEDURE — 99214 OFFICE O/P EST MOD 30 MIN: CPT | Performed by: INTERNAL MEDICINE

## 2017-04-18 RX ORDER — FLUTICASONE PROPIONATE 50 MCG
2 SPRAY, SUSPENSION (ML) NASAL DAILY
Qty: 3 BOTTLE | Refills: 3 | Status: SHIPPED | OUTPATIENT
Start: 2017-04-18 | End: 2018-08-03 | Stop reason: SDUPTHER

## 2017-04-18 RX ORDER — DOXEPIN HYDROCHLORIDE 25 MG/1
25 CAPSULE ORAL NIGHTLY
Qty: 90 CAPSULE | Refills: 3 | Status: SHIPPED | OUTPATIENT
Start: 2017-04-18 | End: 2018-02-27

## 2017-04-18 RX ORDER — DOXEPIN HYDROCHLORIDE 25 MG/1
25 CAPSULE ORAL NIGHTLY
Qty: 30 CAPSULE | Refills: 11 | Status: SHIPPED | OUTPATIENT
Start: 2017-04-18 | End: 2017-04-18 | Stop reason: SDUPTHER

## 2017-04-18 NOTE — PROGRESS NOTES
Subjective   Mary Cueva is a 67 y.o. female. Here for anxiety and insomnia f/u.    History of Present Illness   Mary Cueva 67 y.o. female presents today for anxiety and insomnia f/u. Last was seen on 01/30/2017 and on that visit medication was changed due to inadequate control.We had increased the dose of Venlafaxine to 150 mg and started Doxepine 10 mg at bedtime.  Patient is compliant with treatment and denies  side effects. Patient states that change in treatment helped to achieve better control of the symptoms. Patient states that the mood had much improved with the change in medical treatment. Patient reports that the level of anxiety is much less. Had been much calmer. Patient reports that sleep had much improved. She wonders if the dose of Doxepine could be increased.  The following portions of the patient's history were reviewed and updated as appropriate: allergies, current medications, past family history, past medical history, past social history, past surgical history and problem list.    Review of Systems   Constitutional: Negative for chills and fever.   Eyes: Negative for pain and redness.   Respiratory: Negative for cough and shortness of breath.    Cardiovascular: Negative for chest pain and leg swelling.   Neurological: Negative for dizziness and headaches.       Objective   Physical Exam   Constitutional: She is oriented to person, place, and time. She appears well-developed and well-nourished.   HENT:   Head: Normocephalic and atraumatic.   Right Ear: Tympanic membrane, external ear and ear canal normal.   Left Ear: Tympanic membrane, external ear and ear canal normal.   Nose: Nose normal. Right sinus exhibits no maxillary sinus tenderness and no frontal sinus tenderness. Left sinus exhibits no maxillary sinus tenderness and no frontal sinus tenderness.   Mouth/Throat: Uvula is midline, oropharynx is clear and moist and mucous membranes are normal.   Eyes: Conjunctivae and EOM are normal.  Pupils are equal, round, and reactive to light. Right eye exhibits no discharge. Left eye exhibits no discharge. No scleral icterus.   Neck: Neck supple. No JVD present.   Cardiovascular: Normal rate, regular rhythm and normal heart sounds.  Exam reveals no gallop and no friction rub.    No murmur heard.  Pulmonary/Chest: Effort normal and breath sounds normal. She has no wheezes. She has no rales.   Musculoskeletal: She exhibits no edema.   Lymphadenopathy:     She has no cervical adenopathy.   Neurological: She is alert and oriented to person, place, and time. No cranial nerve deficit.   Skin: Skin is warm and dry. No rash noted.   Psychiatric: She has a normal mood and affect. Her behavior is normal.   Vitals reviewed.      Assessment/Plan   Diagnoses and all orders for this visit:    Generalized anxiety disorder    Insomnia secondary to anxiety  -     Discontinue: doxepin (SINEquan) 25 MG capsule; Take 1 capsule by mouth Every Night.  -     doxepin (SINEquan) 25 MG capsule; Take 1 capsule by mouth Every Night.    Acute recurrent maxillary sinusitis  -     fluticasone (FLONASE) 50 MCG/ACT nasal spray; 2 sprays into each nostril Daily.      JACI - DOING WELL WITH CHANGE IN  MEDICATION. WILL INCREASE THE DOSE OF DOXEPIN TO 25 MG AT BEDTIME.  Insomnia - responded well to the use of Doxepin. Will increase the dose to 25 mg at bedtime.  Osteopenia - DEXA results discussed with patient. Mild osteopenia, actually mild improvement compare to the bone density at DEXA 11/18/2013. Continue Vit D supplements and weight bearing exercise. Will continue monitoring.

## 2017-08-14 ENCOUNTER — APPOINTMENT (OUTPATIENT)
Dept: WOMENS IMAGING | Facility: HOSPITAL | Age: 67
End: 2017-08-14

## 2017-08-14 PROCEDURE — G0202 SCR MAMMO BI INCL CAD: HCPCS | Performed by: RADIOLOGY

## 2017-10-30 RX ORDER — TRIAMTERENE AND HYDROCHLOROTHIAZIDE 75; 50 MG/1; MG/1
TABLET ORAL
Qty: 45 TABLET | Refills: 3 | Status: SHIPPED | OUTPATIENT
Start: 2017-10-30 | End: 2018-08-03 | Stop reason: SDUPTHER

## 2017-11-26 DIAGNOSIS — J01.01 ACUTE RECURRENT MAXILLARY SINUSITIS: ICD-10-CM

## 2017-11-27 RX ORDER — MONTELUKAST SODIUM 10 MG/1
TABLET ORAL
Qty: 30 TABLET | Refills: 11 | Status: SHIPPED | OUTPATIENT
Start: 2017-11-27 | End: 2019-01-06 | Stop reason: SDUPTHER

## 2018-02-09 DIAGNOSIS — F41.8 ANXIETY ASSOCIATED WITH DEPRESSION: ICD-10-CM

## 2018-02-09 DIAGNOSIS — I10 BENIGN ESSENTIAL HYPERTENSION: ICD-10-CM

## 2018-02-09 RX ORDER — AMLODIPINE BESYLATE 5 MG/1
TABLET ORAL
Qty: 90 TABLET | Refills: 3 | Status: SHIPPED | OUTPATIENT
Start: 2018-02-09 | End: 2019-01-29 | Stop reason: SDUPTHER

## 2018-02-09 RX ORDER — VENLAFAXINE HYDROCHLORIDE 150 MG/1
CAPSULE, EXTENDED RELEASE ORAL
Qty: 90 CAPSULE | Refills: 3 | Status: SHIPPED | OUTPATIENT
Start: 2018-02-09 | End: 2019-02-06 | Stop reason: SDUPTHER

## 2018-02-23 ENCOUNTER — LAB (OUTPATIENT)
Dept: INTERNAL MEDICINE | Facility: CLINIC | Age: 68
End: 2018-02-23

## 2018-02-23 DIAGNOSIS — E55.9 VITAMIN D DEFICIENCY: ICD-10-CM

## 2018-02-23 DIAGNOSIS — I10 BENIGN ESSENTIAL HYPERTENSION: Primary | ICD-10-CM

## 2018-02-23 LAB
25(OH)D3 SERPL-MCNC: 31 NG/ML (ref 30–100)
ALBUMIN SERPL-MCNC: 4.2 G/DL (ref 3.5–5.2)
ALBUMIN/GLOB SERPL: 1.2 G/DL
ALP SERPL-CCNC: 88 U/L (ref 39–117)
ALT SERPL W P-5'-P-CCNC: 19 U/L (ref 1–33)
ANION GAP SERPL CALCULATED.3IONS-SCNC: 14.1 MMOL/L
AST SERPL-CCNC: 16 U/L (ref 1–32)
BACTERIA UR QL AUTO: ABNORMAL /HPF
BASOPHILS # BLD AUTO: 0.03 10*3/MM3 (ref 0–0.2)
BASOPHILS NFR BLD AUTO: 0.2 % (ref 0–2)
BILIRUB SERPL-MCNC: 0.5 MG/DL (ref 0.1–1.2)
BILIRUB UR QL CFM: NEGATIVE
BILIRUB UR QL STRIP: ABNORMAL
BUN BLD-MCNC: 15 MG/DL (ref 8–23)
BUN/CREAT SERPL: 21.4 (ref 7–25)
CALCIUM SPEC-SCNC: 9.5 MG/DL (ref 8.6–10.5)
CHLORIDE SERPL-SCNC: 96 MMOL/L (ref 98–107)
CHOLEST SERPL-MCNC: 206 MG/DL (ref 0–200)
CLARITY UR: CLEAR
CO2 SERPL-SCNC: 29.9 MMOL/L (ref 22–29)
COLOR UR: YELLOW
CREAT BLD-MCNC: 0.7 MG/DL (ref 0.57–1)
DEPRECATED RDW RBC AUTO: 41.8 FL (ref 37–54)
EOSINOPHIL # BLD AUTO: 0.37 10*3/MM3 (ref 0–0.7)
EOSINOPHIL NFR BLD AUTO: 2.8 % (ref 0–5)
ERYTHROCYTE [DISTWIDTH] IN BLOOD BY AUTOMATED COUNT: 12.5 % (ref 11.5–15)
GFR SERPL CREATININE-BSD FRML MDRD: 83 ML/MIN/1.73
GLOBULIN UR ELPH-MCNC: 3.6 GM/DL
GLUCOSE BLD-MCNC: 84 MG/DL (ref 65–99)
GLUCOSE UR STRIP-MCNC: NEGATIVE MG/DL
HCT VFR BLD AUTO: 44.8 % (ref 34.1–44.9)
HDLC SERPL-MCNC: 84 MG/DL (ref 40–60)
HGB BLD-MCNC: 15.3 G/DL (ref 11.2–15.7)
HGB UR QL STRIP.AUTO: ABNORMAL
HYALINE CASTS UR QL AUTO: ABNORMAL /LPF
KETONES UR QL STRIP: NEGATIVE
LDLC SERPL CALC-MCNC: 105 MG/DL (ref 0–100)
LDLC/HDLC SERPL: 1.25 {RATIO}
LEUKOCYTE ESTERASE UR QL STRIP.AUTO: NEGATIVE
LYMPHOCYTES # BLD AUTO: 3.82 10*3/MM3 (ref 0.8–7)
LYMPHOCYTES NFR BLD AUTO: 29.2 % (ref 10–60)
MCH RBC QN AUTO: 31.7 PG (ref 26–34)
MCHC RBC AUTO-ENTMCNC: 34.2 G/DL (ref 31–37)
MCV RBC AUTO: 92.9 FL (ref 80–100)
MONOCYTES # BLD AUTO: 0.8 10*3/MM3 (ref 0–1)
MONOCYTES NFR BLD AUTO: 6.1 % (ref 0–13)
MUCOUS THREADS URNS QL MICRO: ABNORMAL /HPF
NEUTROPHILS # BLD AUTO: 8.07 10*3/MM3 (ref 1–11)
NEUTROPHILS NFR BLD AUTO: 61.7 % (ref 30–85)
NITRITE UR QL STRIP: NEGATIVE
PH UR STRIP.AUTO: 5.5 [PH] (ref 5–8)
PLATELET # BLD AUTO: 288 10*3/MM3 (ref 150–450)
PMV BLD AUTO: 11.2 FL (ref 6–12)
POTASSIUM BLD-SCNC: 3.3 MMOL/L (ref 3.5–5.2)
PROT SERPL-MCNC: 7.8 G/DL (ref 6–8.5)
PROT UR QL STRIP: NEGATIVE
RBC # BLD AUTO: 4.82 10*6/MM3 (ref 3.93–5.22)
RBC # UR: ABNORMAL /HPF
REF LAB TEST METHOD: ABNORMAL
SODIUM BLD-SCNC: 140 MMOL/L (ref 136–145)
SP GR UR STRIP: 1.02 (ref 1–1.03)
SQUAMOUS #/AREA URNS HPF: ABNORMAL /HPF
TRIGL SERPL-MCNC: 83 MG/DL (ref 0–150)
TSH SERPL-ACNC: 4.27 MIU/ML (ref 0.27–4.2)
UROBILINOGEN UR QL STRIP: ABNORMAL
VLDLC SERPL-MCNC: 16.6 MG/DL (ref 5–40)
WBC NRBC COR # BLD: 13.09 10*3/MM3 (ref 5–10)
WBC UR QL AUTO: ABNORMAL /HPF

## 2018-02-23 PROCEDURE — 80053 COMPREHEN METABOLIC PANEL: CPT | Performed by: INTERNAL MEDICINE

## 2018-02-23 PROCEDURE — 81001 URINALYSIS AUTO W/SCOPE: CPT | Performed by: INTERNAL MEDICINE

## 2018-02-23 PROCEDURE — 80061 LIPID PANEL: CPT | Performed by: INTERNAL MEDICINE

## 2018-02-23 PROCEDURE — 85025 COMPLETE CBC W/AUTO DIFF WBC: CPT | Performed by: INTERNAL MEDICINE

## 2018-02-27 ENCOUNTER — OFFICE VISIT (OUTPATIENT)
Dept: INTERNAL MEDICINE | Facility: CLINIC | Age: 68
End: 2018-02-27

## 2018-02-27 VITALS
HEART RATE: 97 BPM | SYSTOLIC BLOOD PRESSURE: 118 MMHG | HEIGHT: 63 IN | DIASTOLIC BLOOD PRESSURE: 80 MMHG | BODY MASS INDEX: 30.48 KG/M2 | OXYGEN SATURATION: 98 % | WEIGHT: 172 LBS

## 2018-02-27 DIAGNOSIS — D72.829 LEUKOCYTOSIS, UNSPECIFIED TYPE: ICD-10-CM

## 2018-02-27 DIAGNOSIS — F41.9 INSOMNIA SECONDARY TO ANXIETY: ICD-10-CM

## 2018-02-27 DIAGNOSIS — J01.01 ACUTE RECURRENT MAXILLARY SINUSITIS: ICD-10-CM

## 2018-02-27 DIAGNOSIS — I10 BENIGN ESSENTIAL HYPERTENSION: ICD-10-CM

## 2018-02-27 DIAGNOSIS — E55.9 VITAMIN D DEFICIENCY: ICD-10-CM

## 2018-02-27 DIAGNOSIS — Z00.00 HEALTH CARE MAINTENANCE: Primary | ICD-10-CM

## 2018-02-27 DIAGNOSIS — F51.05 INSOMNIA SECONDARY TO ANXIETY: ICD-10-CM

## 2018-02-27 PROCEDURE — 99214 OFFICE O/P EST MOD 30 MIN: CPT | Performed by: INTERNAL MEDICINE

## 2018-02-27 PROCEDURE — G0439 PPPS, SUBSEQ VISIT: HCPCS | Performed by: INTERNAL MEDICINE

## 2018-02-27 RX ORDER — LEVOCETIRIZINE DIHYDROCHLORIDE 5 MG/1
5 TABLET, FILM COATED ORAL EVERY EVENING
Qty: 90 TABLET | Refills: 3 | Status: SHIPPED | OUTPATIENT
Start: 2018-02-27 | End: 2019-03-11

## 2018-02-27 NOTE — PROGRESS NOTES
"Subjective   Mary Cueva is a 68 y.o. female.     History of Present Illness   /80 (BP Location: Left arm, Patient Position: Sitting, Cuff Size: Adult)  Pulse 97  Ht 159.4 cm (62.75\")  Wt 78 kg (172 lb)  SpO2 98%  BMI 30.71 kg/m2  Patient is being seen for subsequent wellness visit.  Hospitalizations in a past: 4, all aurgical  Once per lifetime Medicare screening tests: ECG - 11/01/2011, nl    AAA risk assessment: 1. FH: none. 2.H/o tobacco smoking: none. 3. CV or PAD: none.    Tobacco use: none   Alcohol use: 4-5 days a week  Illicit drugs use: none  Diet: well balanced, Weight watchers  Exercise: none    Anxiety screening: How many days in the last 2 weeks you were  1. Feeling anxious, nervous, on edge: none  2. Unable to stop worrying: none  3. Worrying too much about different things: none  4. Having problems relaxing:none  5. Feeling restless or unable to sit still:none  6. Feeling irritable or easely annoyed: none  7. Being afraid that something awful might happen: none  Patient is compliant with daily use of Effexor, no side effects. Mood is well controlled.    Depression screening PHQ9:  Over the past 2 weeks how often have you been bothered by  1. Lack of interest or pleasuer in usual activities: none  2. Feeling down, depressed, hopeless:none  3. Troubles falling asleep/sleeping too long: occasional insomnia, but hardly uses Doxepin, that had been prescribed for her.  4. Feeling tired, having little energy: none  5. Poor appetite or overeating: none  6. Feeling bad about yourself:none.  7. Trouble concentrating: at the baseline.  8. Moving or speaking too slow or much faster than usual: none  9. Thoughts about harming yourself:none.    Cognitive impairment screening:   Do you have difficulties with:  1. Language: no  2. Behavior: no  3. Learning/retaining new information: no  4. Handling complex tasks: no  5. Reasoning: no  6. Spacial ability and orientation: no    Hearing: normal.  Driving: " "unrestricted  ADL: independent  ADL details: Does patient needs help with:  telephone use: no  Transportation: no  Housework: no  Shopping: no  meal preparation: no  laundry: no  managing medication:no  Participate in the social activities: Y    Fall risk factors:   1.Use of alcohol: yes    2.Polypharmacy: no  3.H/o of previous fall:  no  4. Mobility impairment:  no  5. Visual impairment:  no  6. Deconditioning: no  7. Use of antihypertensive medication:  yes  8. Use of sedatives: no  9. Use of antidepressant: yes    Home safety:   1.loose rugs: no   2.unfamiliar environment: no   3. Uneven floors: no   4. No grab bars in the bathroom:  no  5. No banister on stairs: no      Advanced directives: not completed. The purpose and whole concept of Living Will explained. I had encouraged patient to discuss the issue with family and document personal wishes and preferences in a form of Living Will..    Co-managers: dermatology , GYN , general surgeon , urologist  urology      /80 (BP Location: Left arm, Patient Position: Sitting, Cuff Size: Adult)  Pulse 97  Ht 159.4 cm (62.75\")  Wt 78 kg (172 lb)  SpO2 98%  BMI 30.71 kg/m2    Current Outpatient Prescriptions:   •  amLODIPine (NORVASC) 5 MG tablet, TAKE ONE TABLET BY MOUTH ONCE DAILY, Disp: 90 tablet, Rfl: 3  •  azelastine (ASTEPRO) 0.15 % solution nasal spray, 2 sprays into each nostril 2 (Two) Times a Day. 2 sprays each nostril twice a day, Disp: 30 mL, Rfl: 11  •  fluticasone (FLONASE) 50 MCG/ACT nasal spray, 2 sprays into each nostril Daily., Disp: 3 bottle, Rfl: 3  •  levocetirizine (XYZAL) 5 MG tablet, Take 1 tablet by mouth Every Evening., Disp: 90 tablet, Rfl: 3  •  montelukast (SINGULAIR) 10 MG tablet, TAKE ONE TABLET BY MOUTH EVERY NIGHT, Disp: 30 tablet, Rfl: 11  •  triamterene-hydrochlorothiazide (MAXZIDE) 75-50 MG per tablet, TAKE ONE-HALF TABLET BY MOUTH ONCE DAILY, Disp: 45 tablet, Rfl: 3  •  venlafaxine XR " (EFFEXOR-XR) 150 MG 24 hr capsule, TAKE ONE CAPSULE BY MOUTH ONCE DAILY, Disp: 90 capsule, Rfl: 3    Patient has long-standing benign essential HTN.  BP is usually well controlled with daily use of Ca channel blocker and thiazide diuretic. On low salt diet with good compliance. Takes medication regularly. Denies chest pain, dyspnea,lightheadedness,  lower extremity edema. Patient does not check blood pressure    Patient had been diagnosed with Vitamin D deficiency. Takes over the counter Vit D3 400 IU a day. Patient does not have complaints of muscle or bone aches. Balance is good. No recent falls.     Patient also is here for chronic insomnia f/u.Had been prescribed Doxepin, but takes it only seldom and believes that she does not need medication, as she is able to sleep quite well.                                  The following portions of the patient's history were reviewed and updated as appropriate: allergies, current medications, past family history, past medical history, past social history, past surgical history and problem list.    Review of Systems   Constitutional: Positive for unexpected weight change (lost 31 lbs in the 18 months with Weight Watchers). Negative for chills, fatigue and fever.   HENT: Negative for postnasal drip, sinus pressure and sore throat.    Eyes: Negative for pain, discharge and itching.   Respiratory: Negative for cough and chest tightness.    Cardiovascular: Negative for chest pain and leg swelling.   Gastrointestinal: Negative for abdominal pain and blood in stool.   Endocrine: Negative for cold intolerance and heat intolerance.   Genitourinary: Negative for difficulty urinating, dysuria, flank pain, frequency and urgency.   Musculoskeletal: Positive for arthralgias and back pain. Negative for neck pain.   Skin: Negative for color change and rash.   Allergic/Immunologic: Positive for environmental allergies.   Neurological: Negative for dizziness, weakness, light-headedness,  numbness and headaches.   Hematological: Negative for adenopathy. Does not bruise/bleed easily.   Psychiatric/Behavioral: Negative for decreased concentration and sleep disturbance. The patient is not nervous/anxious.        Objective   Physical Exam   Constitutional: She is oriented to person, place, and time. Vital signs are normal. She appears well-developed and well-nourished. No distress.   HENT:   Head: Normocephalic and atraumatic.   Right Ear: External ear normal.   Left Ear: External ear normal.   Nose: Nose normal. No mucosal edema. Right sinus exhibits no maxillary sinus tenderness and no frontal sinus tenderness. Left sinus exhibits no maxillary sinus tenderness and no frontal sinus tenderness.   Mouth/Throat: Oropharynx is clear and moist. No oropharyngeal exudate.   Eyes: Conjunctivae, EOM and lids are normal. Pupils are equal, round, and reactive to light. Right eye exhibits no discharge. Left eye exhibits no discharge. Right conjunctiva is not injected. Left conjunctiva is not injected. No scleral icterus. Right eye exhibits normal extraocular motion. Left eye exhibits normal extraocular motion.   Neck: Normal range of motion and full passive range of motion without pain. Neck supple. No JVD present. Carotid bruit is not present. No thyromegaly present.   Cardiovascular: Normal rate, regular rhythm, S1 normal, S2 normal, normal heart sounds and intact distal pulses.  PMI is not displaced.  Exam reveals no gallop and no friction rub.    No murmur heard.  Pulmonary/Chest: Effort normal and breath sounds normal. No accessory muscle usage. No respiratory distress. She has no decreased breath sounds. She has no wheezes. She has no rhonchi. She has no rales. Right breast exhibits no inverted nipple, no mass, no nipple discharge, no skin change and no tenderness. Left breast exhibits no inverted nipple, no mass, no nipple discharge, no skin change and no tenderness. Breasts are symmetrical.   Abdominal:  Soft. Bowel sounds are normal. She exhibits no distension, no pulsatile liver, no fluid wave, no abdominal bruit, no ascites and no mass. There is no tenderness. There is no rebound and no guarding.   Musculoskeletal: She exhibits no edema or deformity.   Lymphadenopathy:        Head (right side): No submental, no submandibular, no preauricular, no posterior auricular and no occipital adenopathy present.        Head (left side): No submental, no submandibular, no tonsillar, no preauricular, no posterior auricular and no occipital adenopathy present.     She has no cervical adenopathy.        Right cervical: No superficial cervical, no deep cervical and no posterior cervical adenopathy present.       Left cervical: No superficial cervical, no deep cervical and no posterior cervical adenopathy present.     She has no axillary adenopathy.        Right: No supraclavicular adenopathy present.        Left: No supraclavicular adenopathy present.   Neurological: She is alert and oriented to person, place, and time. She has normal strength and normal reflexes. She displays normal reflexes. No cranial nerve deficit. She exhibits normal muscle tone. Coordination normal.   Reflex Scores:       Bicep reflexes are 2+ on the right side and 2+ on the left side.       Patellar reflexes are 2+ on the right side and 2+ on the left side.  Skin: Skin is warm and dry. No rash noted. She is not diaphoretic. No erythema.   Few SK lesions   Psychiatric: She has a normal mood and affect. Her speech is normal and behavior is normal. Thought content normal.   Vitals reviewed.      Assessment/Plan    was seen today for subsequent wellness visit, hypertension and allergic rhinitis.    Diagnoses and all orders for this visit:    Health care maintenance    Benign essential hypertension    Vitamin D deficiency    Insomnia secondary to anxiety    Acute recurrent maxillary sinusitis  -     levocetirizine (XYZAL) 5 MG tablet; Take 1 tablet by  mouth Every Evening.        Annual wellness visit with preventive exam as well as age and risk appropriate councelling completed.    Cardiovascular disease councelling: current. Recommended: Excellent blood sugars control., Excellent cholesterol.  Diabetes screening and councelling: current. Recommended: continue Weight Watchers diet  Breast cancer screening: up to date. Recommended frequency and time of the next screening per GYN..  Osteoporosis screening: up to date. Recommended frequency every 2 years. Next DEXA is due in 2019..  Glaucoma screening: up to date.   AAA screening: not needed..  Hep C screening (born between 3167-6107) completed..  Colorectal cancer screening: up to date. Recommended every 10 years. Next screening is due in 2026..    Immunizations:   1. Influenza vaccine  is up to date and recommended yearly.   2. Pneumococcal vaccines: completed.   3. Zostavax: completed.      Advanced directives planning: not completed. The purpose and whole concept of Living Will explained. I had encouraged patient to discuss the issue with family and document personal wishes and preferences in a form of Living Will..    Medications reviewed and medication list updated.   Potential harmful drug-disease interactions in the elderly:none.  High risk medication in elderly: none  ASA use: no indications.    HTN - well controlled  with current medication. Reminded of the importance of low salt diet. Normal kidney tests and electrolytes. Continue same treatment.  Allergic rhinitis - well controlled with Xyzal, will continue same.  Insomnia - practically resolved, no need for medical sleep aids. Will discopntinue Doxep[in.  Leukocytosis - will repeat CBC.  Microscopic hematuria - under the care of romero with negative w/u.

## 2018-03-13 ENCOUNTER — LAB (OUTPATIENT)
Dept: INTERNAL MEDICINE | Facility: CLINIC | Age: 68
End: 2018-03-13

## 2018-03-13 DIAGNOSIS — D72.829 LEUKOCYTOSIS, UNSPECIFIED TYPE: ICD-10-CM

## 2018-03-13 LAB
BASOPHILS # BLD AUTO: 0.04 10*3/MM3 (ref 0–0.2)
BASOPHILS NFR BLD AUTO: 0.4 % (ref 0–1.5)
EOSINOPHIL # BLD AUTO: 0.34 10*3/MM3 (ref 0–0.7)
EOSINOPHIL # BLD AUTO: 3.1 % (ref 0.3–6.2)
ERYTHROCYTE [DISTWIDTH] IN BLOOD BY AUTOMATED COUNT: 13.4 % (ref 11.7–13)
HCT VFR BLD AUTO: 46.9 % (ref 35.6–45.5)
HGB BLD-MCNC: 15.2 G/DL (ref 11.9–15.5)
IMM GRANULOCYTES # BLD: 0.03 10*3/MM3 (ref 0–0.03)
IMM GRANULOCYTES NFR BLD: 0.3 % (ref 0–0.5)
LYMPHOCYTES # BLD AUTO: 3.52 10*3/MM3 (ref 0.9–4.8)
LYMPHOCYTES NFR BLD AUTO: 32.1 % (ref 19.6–45.3)
MCH RBC QN AUTO: 31.6 PG (ref 26.9–32)
MCHC RBC AUTO-ENTMCNC: 32.4 G/DL (ref 32.4–36.3)
MCV RBC AUTO: 97.5 FL (ref 80.5–98.2)
MONOCYTES # BLD AUTO: 0.76 10*3/MM3 (ref 0.2–1.2)
MONOCYTES NFR BLD AUTO: 6.9 % (ref 5–12)
NEUTROPHILS # BLD AUTO: 6.26 10*3/MM3 (ref 1.9–8.1)
NEUTROPHILS NFR BLD AUTO: 57.2 % (ref 42.7–76)
PLATELET # BLD AUTO: 301 10*3/MM3 (ref 140–500)
RBC # BLD AUTO: 4.81 10*6/MM3 (ref 3.9–5.2)
WBC # BLD AUTO: 10.95 10*3/MM3 (ref 4.5–10.7)

## 2018-08-03 DIAGNOSIS — J01.01 ACUTE RECURRENT MAXILLARY SINUSITIS: ICD-10-CM

## 2018-08-03 RX ORDER — TRIAMTERENE AND HYDROCHLOROTHIAZIDE 75; 50 MG/1; MG/1
0.5 TABLET ORAL DAILY
Qty: 45 TABLET | Refills: 0 | Status: SHIPPED | OUTPATIENT
Start: 2018-08-03 | End: 2018-08-28 | Stop reason: SDUPTHER

## 2018-08-03 RX ORDER — FLUTICASONE PROPIONATE 50 MCG
2 SPRAY, SUSPENSION (ML) NASAL DAILY
Qty: 1 BOTTLE | Refills: 0 | Status: SHIPPED | OUTPATIENT
Start: 2018-08-03 | End: 2018-08-28 | Stop reason: SDUPTHER

## 2018-08-09 ENCOUNTER — TELEPHONE (OUTPATIENT)
Dept: INTERNAL MEDICINE | Facility: CLINIC | Age: 68
End: 2018-08-09

## 2018-08-09 NOTE — TELEPHONE ENCOUNTER
----- Message from Rafaela Estrella sent at 8/9/2018  2:54 PM EDT -----  Need lab orders please.  Patient scheduled 08/21.  Thanks.

## 2018-08-28 ENCOUNTER — OFFICE VISIT (OUTPATIENT)
Dept: INTERNAL MEDICINE | Facility: CLINIC | Age: 68
End: 2018-08-28

## 2018-08-28 VITALS
BODY MASS INDEX: 31.71 KG/M2 | WEIGHT: 179 LBS | DIASTOLIC BLOOD PRESSURE: 74 MMHG | SYSTOLIC BLOOD PRESSURE: 124 MMHG | HEIGHT: 63 IN | RESPIRATION RATE: 14 BRPM

## 2018-08-28 DIAGNOSIS — H91.93 BILATERAL HEARING LOSS, UNSPECIFIED HEARING LOSS TYPE: ICD-10-CM

## 2018-08-28 DIAGNOSIS — I10 BENIGN ESSENTIAL HYPERTENSION: Primary | ICD-10-CM

## 2018-08-28 DIAGNOSIS — J30.89 CHRONIC NON-SEASONAL ALLERGIC RHINITIS, UNSPECIFIED TRIGGER: ICD-10-CM

## 2018-08-28 DIAGNOSIS — J30.9 ALLERGIC SINUSITIS: ICD-10-CM

## 2018-08-28 DIAGNOSIS — E55.9 VITAMIN D DEFICIENCY: ICD-10-CM

## 2018-08-28 LAB — 25(OH)D3 SERPL-MCNC: 30.7 NG/ML (ref 30–100)

## 2018-08-28 PROCEDURE — 36415 COLL VENOUS BLD VENIPUNCTURE: CPT | Performed by: INTERNAL MEDICINE

## 2018-08-28 PROCEDURE — 82306 VITAMIN D 25 HYDROXY: CPT | Performed by: INTERNAL MEDICINE

## 2018-08-28 PROCEDURE — 99214 OFFICE O/P EST MOD 30 MIN: CPT | Performed by: INTERNAL MEDICINE

## 2018-08-28 RX ORDER — FLUTICASONE PROPIONATE 50 MCG
2 SPRAY, SUSPENSION (ML) NASAL DAILY
Qty: 3 BOTTLE | Refills: 3 | Status: SHIPPED | OUTPATIENT
Start: 2018-08-28 | End: 2019-09-19 | Stop reason: SDUPTHER

## 2018-08-28 RX ORDER — TRIAMTERENE AND HYDROCHLOROTHIAZIDE 75; 50 MG/1; MG/1
0.5 TABLET ORAL DAILY
Qty: 45 TABLET | Refills: 3 | Status: SHIPPED | OUTPATIENT
Start: 2018-08-28 | End: 2019-09-19 | Stop reason: SDUPTHER

## 2018-08-28 NOTE — PATIENT INSTRUCTIONS
HTN - well controlled with current medication, no side effects. Continue same and low salt diet.  Vit D deficiency - off supplement at that time, will check blood level.  Hearing impairment - will refer to audiologist for testing.

## 2018-08-28 NOTE — PROGRESS NOTES
"Subjective   Mary Cueva is a 68 y.o. female.     History of Present Illness     /74 (BP Location: Left arm, Patient Position: Sitting, Cuff Size: Adult)   Resp 14   Ht 159.4 cm (62.75\")   Wt 81.2 kg (179 lb)   BMI 31.96 kg/m²     Current Outpatient Prescriptions:   •  amLODIPine (NORVASC) 5 MG tablet, TAKE ONE TABLET BY MOUTH ONCE DAILY, Disp: 90 tablet, Rfl: 3  •  azelastine (ASTEPRO) 0.15 % solution nasal spray, 2 sprays into each nostril 2 (Two) Times a Day. 2 sprays each nostril twice a day, Disp: 30 mL, Rfl: 11  •  fluticasone (FLONASE) 50 MCG/ACT nasal spray, 2 sprays into the nostril(s) as directed by provider Daily., Disp: 1 bottle, Rfl: 0  •  levocetirizine (XYZAL) 5 MG tablet, Take 1 tablet by mouth Every Evening., Disp: 90 tablet, Rfl: 3  •  montelukast (SINGULAIR) 10 MG tablet, TAKE ONE TABLET BY MOUTH EVERY NIGHT, Disp: 30 tablet, Rfl: 11  •  triamterene-hydrochlorothiazide (MAXZIDE) 75-50 MG per tablet, Take 0.5 tablets by mouth Daily., Disp: 45 tablet, Rfl: 0  •  venlafaxine XR (EFFEXOR-XR) 150 MG 24 hr capsule, TAKE ONE CAPSULE BY MOUTH ONCE DAILY, Disp: 90 capsule, Rfl: 3    Patient has long-standing benign essential HTN.  BP is usually well controlled with daily use of Ca channel blocker and thiazide diuretic. On low salt diet with good compliance. Takes medication regularly. Denies chest pain, dyspnea,lightheadedness,  lower extremity edema. Patient does not check blood pressure    Patient had been diagnosed with Vitamin D deficiency. Takes no supplement. Patient does not have complaints of muscle or bone aches. Balance is good. No recent falls.     Patient c/o progressive bilateral hearing impairment. Hard to catch what her  tells her, especially beginning of the sentences.    The following portions of the patient's history were reviewed and updated as appropriate: allergies, current medications, past family history, past medical history, past social history, past surgical " history and problem list.    Review of Systems   Constitutional: Negative for chills and fever.   Eyes: Negative for pain and redness.   Respiratory: Negative for cough and shortness of breath.    Cardiovascular: Negative for chest pain and leg swelling.   Neurological: Negative for dizziness and headaches.       Objective   Physical Exam   Constitutional: She is oriented to person, place, and time. She appears well-developed and well-nourished.   HENT:   Head: Normocephalic and atraumatic.   Right Ear: Tympanic membrane, external ear and ear canal normal.   Left Ear: Tympanic membrane, external ear and ear canal normal.   Nose: Nose normal. Right sinus exhibits no maxillary sinus tenderness and no frontal sinus tenderness. Left sinus exhibits no maxillary sinus tenderness and no frontal sinus tenderness.   Mouth/Throat: Uvula is midline, oropharynx is clear and moist and mucous membranes are normal.   Eyes: Pupils are equal, round, and reactive to light. Conjunctivae and EOM are normal. Right eye exhibits no discharge. Left eye exhibits no discharge. No scleral icterus.   Neck: Neck supple. No JVD present.   Cardiovascular: Normal rate, regular rhythm and normal heart sounds.  Exam reveals no gallop and no friction rub.    No murmur heard.  Pulmonary/Chest: Effort normal and breath sounds normal. She has no wheezes. She has no rales.   Musculoskeletal: She exhibits no edema.   Lymphadenopathy:     She has no cervical adenopathy.   Neurological: She is alert and oriented to person, place, and time. No cranial nerve deficit.   Skin: Skin is warm and dry. No rash noted.   Psychiatric: She has a normal mood and affect. Her behavior is normal.   Vitals reviewed.      Assessment/Plan    was seen today for hypertension and vitamin d deficiency.    Diagnoses and all orders for this visit:    Benign essential hypertension  -     triamterene-hydrochlorothiazide (MAXZIDE) 75-50 MG per tablet; Take 0.5 tablets by mouth  Daily.    Vitamin D deficiency  -     Vitamin D 25 Hydroxy; Future    Chronic non-seasonal allergic rhinitis, unspecified trigger  -     fluticasone (FLONASE) 50 MCG/ACT nasal spray; 2 sprays into the nostril(s) as directed by provider Daily.    Allergic sinusitis  -     fluticasone (FLONASE) 50 MCG/ACT nasal spray; 2 sprays into the nostril(s) as directed by provider Daily.    Bilateral hearing loss, unspecified hearing loss type  -     Ambulatory Referral to Audiology    HTN - well controlled with current medication, no side effects. Continue same and low salt diet.  Vit D deficiency - off supplement at that time, will check blood level.  Hearing impairment - will refer to audiologist for testing.

## 2018-09-14 ENCOUNTER — APPOINTMENT (OUTPATIENT)
Dept: WOMENS IMAGING | Facility: HOSPITAL | Age: 68
End: 2018-09-14

## 2018-09-14 PROCEDURE — 77063 BREAST TOMOSYNTHESIS BI: CPT | Performed by: RADIOLOGY

## 2018-09-14 PROCEDURE — 77067 SCR MAMMO BI INCL CAD: CPT | Performed by: RADIOLOGY

## 2019-01-06 DIAGNOSIS — J01.01 ACUTE RECURRENT MAXILLARY SINUSITIS: ICD-10-CM

## 2019-01-07 RX ORDER — MONTELUKAST SODIUM 10 MG/1
TABLET ORAL
Qty: 30 TABLET | Refills: 11 | Status: SHIPPED | OUTPATIENT
Start: 2019-01-07 | End: 2020-01-20

## 2019-01-29 DIAGNOSIS — I10 BENIGN ESSENTIAL HYPERTENSION: ICD-10-CM

## 2019-01-29 RX ORDER — AMLODIPINE BESYLATE 5 MG/1
TABLET ORAL
Qty: 90 TABLET | Refills: 3 | Status: SHIPPED | OUTPATIENT
Start: 2019-01-29 | End: 2020-01-20

## 2019-02-06 DIAGNOSIS — F41.8 ANXIETY ASSOCIATED WITH DEPRESSION: ICD-10-CM

## 2019-02-06 RX ORDER — VENLAFAXINE HYDROCHLORIDE 150 MG/1
CAPSULE, EXTENDED RELEASE ORAL
Qty: 90 CAPSULE | Refills: 3 | Status: SHIPPED | OUTPATIENT
Start: 2019-02-06 | End: 2020-01-20

## 2019-02-26 ENCOUNTER — TELEPHONE (OUTPATIENT)
Dept: INTERNAL MEDICINE | Facility: CLINIC | Age: 69
End: 2019-02-26

## 2019-02-26 DIAGNOSIS — D72.829 LEUKOCYTOSIS, UNSPECIFIED TYPE: ICD-10-CM

## 2019-02-26 DIAGNOSIS — E55.9 VITAMIN D DEFICIENCY: ICD-10-CM

## 2019-02-26 DIAGNOSIS — I10 BENIGN ESSENTIAL HYPERTENSION: Primary | ICD-10-CM

## 2019-02-26 NOTE — TELEPHONE ENCOUNTER
----- Message from Rafaela Estrella sent at 2/26/2019 11:05 AM EST -----  Need lab orders please; scheduled for 03/04/19.  Thanks

## 2019-03-07 ENCOUNTER — TELEPHONE (OUTPATIENT)
Dept: INTERNAL MEDICINE | Facility: CLINIC | Age: 69
End: 2019-03-07

## 2019-03-07 ENCOUNTER — LAB (OUTPATIENT)
Dept: INTERNAL MEDICINE | Facility: CLINIC | Age: 69
End: 2019-03-07

## 2019-03-07 DIAGNOSIS — R82.90 ABNORMAL URINE FINDINGS: ICD-10-CM

## 2019-03-07 DIAGNOSIS — E55.9 VITAMIN D DEFICIENCY: ICD-10-CM

## 2019-03-07 DIAGNOSIS — D72.829 LEUKOCYTOSIS, UNSPECIFIED TYPE: ICD-10-CM

## 2019-03-07 DIAGNOSIS — I10 BENIGN ESSENTIAL HYPERTENSION: Primary | ICD-10-CM

## 2019-03-07 LAB
25(OH)D3 SERPL-MCNC: 30 NG/ML (ref 30–100)
ALBUMIN SERPL-MCNC: 4.2 G/DL (ref 3.5–5.2)
ALBUMIN/GLOB SERPL: 1.3 G/DL
ALP SERPL-CCNC: 73 U/L (ref 39–117)
ALT SERPL W P-5'-P-CCNC: 14 U/L (ref 1–33)
ANION GAP SERPL CALCULATED.3IONS-SCNC: 10 MMOL/L
AST SERPL-CCNC: 14 U/L (ref 1–32)
BACTERIA UR QL AUTO: ABNORMAL /HPF
BASOPHILS # BLD AUTO: 0.04 10*3/MM3 (ref 0–0.2)
BASOPHILS NFR BLD AUTO: 0.4 % (ref 0–1.5)
BILIRUB SERPL-MCNC: 0.4 MG/DL (ref 0.1–1.2)
BILIRUB UR QL CFM: NEGATIVE
BILIRUB UR QL STRIP: ABNORMAL
BUN BLD-MCNC: 12 MG/DL (ref 8–23)
BUN/CREAT SERPL: 17.4 (ref 7–25)
CALCIUM SPEC-SCNC: 9.7 MG/DL (ref 8.6–10.5)
CHLORIDE SERPL-SCNC: 102 MMOL/L (ref 98–107)
CHOLEST SERPL-MCNC: 174 MG/DL (ref 0–200)
CLARITY UR: ABNORMAL
CO2 SERPL-SCNC: 30 MMOL/L (ref 22–29)
COLOR UR: YELLOW
CREAT BLD-MCNC: 0.69 MG/DL (ref 0.57–1)
DEPRECATED RDW RBC AUTO: 44.3 FL (ref 37–54)
EOSINOPHIL # BLD AUTO: 0.27 10*3/MM3 (ref 0–0.4)
EOSINOPHIL NFR BLD AUTO: 2.8 % (ref 0.3–6.2)
ERYTHROCYTE [DISTWIDTH] IN BLOOD BY AUTOMATED COUNT: 13.2 % (ref 12.3–15.4)
GFR SERPL CREATININE-BSD FRML MDRD: 84 ML/MIN/1.73
GLOBULIN UR ELPH-MCNC: 3.2 GM/DL
GLUCOSE BLD-MCNC: 89 MG/DL (ref 65–99)
GLUCOSE UR STRIP-MCNC: NEGATIVE MG/DL
HCT VFR BLD AUTO: 41.1 % (ref 34–46.6)
HDLC SERPL-MCNC: 81 MG/DL (ref 40–60)
HGB BLD-MCNC: 13.8 G/DL (ref 12–15.9)
HGB UR QL STRIP.AUTO: ABNORMAL
HYALINE CASTS UR QL AUTO: ABNORMAL /LPF
KETONES UR QL STRIP: NEGATIVE
LDLC SERPL CALC-MCNC: 77 MG/DL (ref 0–100)
LDLC/HDLC SERPL: 0.95 {RATIO}
LEUKOCYTE ESTERASE UR QL STRIP.AUTO: ABNORMAL
LYMPHOCYTES # BLD AUTO: 2.26 10*3/MM3 (ref 0.7–3.1)
LYMPHOCYTES NFR BLD AUTO: 23.2 % (ref 19.6–45.3)
MCH RBC QN AUTO: 31.7 PG (ref 26.6–33)
MCHC RBC AUTO-ENTMCNC: 33.6 G/DL (ref 31.5–35.7)
MCV RBC AUTO: 94.3 FL (ref 79–97)
MONOCYTES # BLD AUTO: 0.72 10*3/MM3 (ref 0.1–0.9)
MONOCYTES NFR BLD AUTO: 7.4 % (ref 5–12)
MUCOUS THREADS URNS QL MICRO: ABNORMAL /HPF
NEUTROPHILS # BLD AUTO: 6.46 10*3/MM3 (ref 1.4–7)
NEUTROPHILS NFR BLD AUTO: 66.2 % (ref 42.7–76)
NITRITE UR QL STRIP: NEGATIVE
PH UR STRIP.AUTO: 5.5 [PH] (ref 5–8)
PLATELET # BLD AUTO: 279 10*3/MM3 (ref 140–450)
PMV BLD AUTO: 11.4 FL (ref 6–12)
POTASSIUM BLD-SCNC: 3.3 MMOL/L (ref 3.5–5.2)
PROT SERPL-MCNC: 7.4 G/DL (ref 6–8.5)
PROT UR QL STRIP: NEGATIVE
RBC # BLD AUTO: 4.36 10*6/MM3 (ref 3.77–5.28)
RBC # UR: ABNORMAL /HPF
REF LAB TEST METHOD: ABNORMAL
SODIUM BLD-SCNC: 142 MMOL/L (ref 136–145)
SP GR UR STRIP: >=1.03 (ref 1–1.03)
SQUAMOUS #/AREA URNS HPF: ABNORMAL /HPF
TRIGL SERPL-MCNC: 80 MG/DL (ref 0–150)
TSH SERPL DL<=0.05 MIU/L-ACNC: 2.23 MIU/ML (ref 0.27–4.2)
UROBILINOGEN UR QL STRIP: ABNORMAL
VLDLC SERPL-MCNC: 16 MG/DL (ref 5–40)
WBC NRBC COR # BLD: 9.75 10*3/MM3 (ref 3.4–10.8)
WBC UR QL AUTO: ABNORMAL /HPF

## 2019-03-07 PROCEDURE — 80061 LIPID PANEL: CPT | Performed by: INTERNAL MEDICINE

## 2019-03-07 PROCEDURE — 84443 ASSAY THYROID STIM HORMONE: CPT | Performed by: INTERNAL MEDICINE

## 2019-03-07 PROCEDURE — 81001 URINALYSIS AUTO W/SCOPE: CPT | Performed by: INTERNAL MEDICINE

## 2019-03-07 PROCEDURE — 87086 URINE CULTURE/COLONY COUNT: CPT | Performed by: INTERNAL MEDICINE

## 2019-03-07 PROCEDURE — 80053 COMPREHEN METABOLIC PANEL: CPT | Performed by: INTERNAL MEDICINE

## 2019-03-07 PROCEDURE — 36415 COLL VENOUS BLD VENIPUNCTURE: CPT | Performed by: INTERNAL MEDICINE

## 2019-03-07 PROCEDURE — 82306 VITAMIN D 25 HYDROXY: CPT | Performed by: INTERNAL MEDICINE

## 2019-03-07 PROCEDURE — 85025 COMPLETE CBC W/AUTO DIFF WBC: CPT | Performed by: INTERNAL MEDICINE

## 2019-03-07 RX ORDER — CIPROFLOXACIN 250 MG/1
250 TABLET, FILM COATED ORAL 2 TIMES DAILY
Qty: 6 TABLET | Refills: 0 | Status: SHIPPED | OUTPATIENT
Start: 2019-03-07 | End: 2019-03-11

## 2019-03-07 NOTE — TELEPHONE ENCOUNTER
Patient states she is not having any UTI symptoms. I educated patient on increasing fluids to flush out kidneys and urinary tract. Patient states she definitely will, could be due to her schedule staying at the hospital and rehab with her  she has not been able to think to drink many fluids or eat a proper diet.

## 2019-03-07 NOTE — TELEPHONE ENCOUNTER
Pt would like to know if she could have a prescriptions. States her lower back is hurting and around ovaries.  As she didn't know they were related she thought she pulled a muscle.  No burning.    Pt# [phone]    [place] [id number] [facility] [phone] [facility] [phone] FX

## 2019-03-07 NOTE — TELEPHONE ENCOUNTER
----- Message from Erica Knight MD sent at 3/7/2019 12:40 PM EST -----  Please call and find out is she has any urinary s-ms, looks like UTI

## 2019-03-09 LAB — BACTERIA SPEC AEROBE CULT: NORMAL

## 2019-03-11 ENCOUNTER — OFFICE VISIT (OUTPATIENT)
Dept: INTERNAL MEDICINE | Facility: CLINIC | Age: 69
End: 2019-03-11

## 2019-03-11 VITALS
DIASTOLIC BLOOD PRESSURE: 74 MMHG | BODY MASS INDEX: 33.49 KG/M2 | RESPIRATION RATE: 14 BRPM | HEIGHT: 63 IN | WEIGHT: 189 LBS | SYSTOLIC BLOOD PRESSURE: 128 MMHG

## 2019-03-11 DIAGNOSIS — M54.9 UPPER BACK PAIN, CHRONIC: ICD-10-CM

## 2019-03-11 DIAGNOSIS — F41.1 GENERALIZED ANXIETY DISORDER: ICD-10-CM

## 2019-03-11 DIAGNOSIS — Z00.00 HEALTH CARE MAINTENANCE: Primary | ICD-10-CM

## 2019-03-11 DIAGNOSIS — I10 BENIGN ESSENTIAL HYPERTENSION: ICD-10-CM

## 2019-03-11 DIAGNOSIS — G89.29 UPPER BACK PAIN, CHRONIC: ICD-10-CM

## 2019-03-11 DIAGNOSIS — Z78.0 POST-MENOPAUSE: ICD-10-CM

## 2019-03-11 DIAGNOSIS — E55.9 VITAMIN D DEFICIENCY: ICD-10-CM

## 2019-03-11 PROCEDURE — 99214 OFFICE O/P EST MOD 30 MIN: CPT | Performed by: INTERNAL MEDICINE

## 2019-03-11 PROCEDURE — G0439 PPPS, SUBSEQ VISIT: HCPCS | Performed by: INTERNAL MEDICINE

## 2019-03-11 RX ORDER — MELOXICAM 15 MG/1
15 TABLET ORAL DAILY
Qty: 30 TABLET | Refills: 11 | Status: SHIPPED | OUTPATIENT
Start: 2019-03-11 | End: 2020-04-14

## 2019-03-11 NOTE — PATIENT INSTRUCTIONS
Serving Sizes  A serving size is a measured amount of food or drink, such as one slice of bread, that has an associated nutrient content. Knowing the serving size of a food or drink can help you determine how much of that food you should consume.  What is the size of one serving?  The size of one healthy serving depends on the food or drink. To determine a serving size, read the food label. If the food or drink does not have a food label, try to find serving size information online. Or, use the following to estimate the size of one adult serving:  Grain  1 slice bread. ½ bagel. ½ cup pasta.  Vegetable  ½ cup cooked or canned vegetables. 1 cup raw, leafy greens.  Fruit  ½ cup canned fruit. 1 medium fruit. ¼ cup dried fruit.  Meat and Other Protein Sources  1 oz meat, poultry, or fish. ¼ cup cooked beans. 1 egg. ¼ cup nuts or seeds. 1 Tbsp nut butter. ¼ cup tofu or tempeh. 2 Tbsp hummus.  Dairy  An individual container of yogurt (6-8 oz). 1 piece of cheese the size of your thumb (1 oz). 1 cup (8 oz) milk or milk alternative.  Fat  A piece the size of one dice. 1 tsp soft margarine. 1 Tbsp mayonnaise. 1 tsp vegetable oil. 1 Tbsp regular salad dressing. 2 Tbsp low-fat salad dressing.  How many servings should I eat from each food group each day?  The following are the suggested number of servings to try and have every day from each food group. You can also look at your eating throughout the week and aim for meeting these requirements on most days for overall healthy eating.  Grain  6-8 servings. Try to have half of your grains from whole grains, such as whole wheat bread, corn tortillas, oatmeal, brown rice, whole wheat pasta, and bulgur.  Vegetable  At least 2½-3 servings.  Fruit  2 servings.  Meat and Other Protein Foods  5-6 servings. Aim to have lean proteins, such as chicken, turkey, fish, beans, or tofu.  Dairy  3 servings. Choose low-fat or nonfat if you are trying to control your weight.  Fat  2-3  servings.  Is a serving the same thing as a portion?  No. A portion is the actual amount you eat, which may be more than one serving. Knowing the specific serving size of a food and the nutritional information that goes with it can help you make a healthy decision on what size portion to eat.  What are some tips to help me learn healthy serving sizes?  · Check food labels for serving sizes. Many foods that come as a single portion actually contain multiple servings.  · Determine the serving size of foods you commonly eat and figure out how large a portion you usually eat.  · Measure the number of servings that can be held by the bowls, glasses, cups, and plates you typically use. For example, pour your breakfast cereal into your regular bowl and then pour it into a measuring cup.  · For 1-2 days, measure the serving sizes of all the foods you eat.  · Practice estimating serving sizes and determining how big your portions should be.  This information is not intended to replace advice given to you by your health care provider. Make sure you discuss any questions you have with your health care provider.  Document Released: 09/15/2004 Document Revised: 08/12/2017 Document Reviewed: 03/17/2015  [employer] Interactive Patient Education © 2018 [employer]    Annual wellness visit with preventive exam as well as age and risk appropriate councelling completed.    Cardiovascular disease councelling: current. Recommended: Excellent cholesterol.  Diabetes screening and councelling: current. Recommended: Not at risk for diabetes.  Breast cancer screening: up to date. Recommended frequency and time of the next screening per GYN..  Osteoporosis screening: is due, will schedule. Benefits explained..  Glaucoma screening: up to date.   AAA screening: not needed..  Hep C screening (born between [birth year]46) completed..  Colorectal cancer screening: up to date. Recommended every 10 years. Next screening is due in  2026..    Immunizations:   1. Influenza vaccine  is up to date and recommended yearly.   2. Pneumococcal vaccines: completed.   3. Shingles prevention:  Zostavax completed, recommended to get new shingles vaccine Shindrix at [facility], benefits explained.      Advanced directives planning: not completed. The purpose and whole concept of Living Will explained. I had encouraged patient to discuss the issue with family and document personal wishes and preferences in a form of Living Will..    Medications reviewed and medication list updated.   Potential harmful drug-disease interactions in the elderly:none.  High risk medication in elderly: none  ASA use: no indications.    HTN - well controlled with current medication regimen. Normal kidney tests and electrolytes. Recommended low salt diet. Continue same medical treatment.  Vitamin D deficiency - well compensated with over the counter Vit D3. Continue same dose. This supplement is fat-soluble and has to be taken with meals.  Anxiety - well controlled with Venlafaxine, doing well keeping up with taking care of her , who just had neck surgery.  Upper back pain - most likely arthritis, will try Meloxicam 15 mg once a day.    Return in about 6 months (around 9/11/2019) for HTN, Vit D def.

## 2019-03-11 NOTE — PROGRESS NOTES
"Subjective   Mary Cueva is a 69 y.o. female.     History of Present Illness   /74 (BP Location: Left arm, Patient Position: Sitting, Cuff Size: Adult)   Resp 14   Ht 159.4 cm (62.75\")   Wt 85.7 kg (189 lb)   BMI 33.75 kg/m²   Patient is being seen for subsequent wellness visit.  Hospitalizations in a past: 4, all surgical, none in the last few years.  Once per lifetime Medicare screening tests: ECG - 11/01/2011, nl    AAA risk assessment: 1. FH: none. 2.H/o tobacco smoking: none. 3. CV or PAD: none.    Tobacco use: none   Alcohol use: 4-5 days a week, a glass of wine.  Illicit drugs use: none  Diet: well balanced  Exercise: none    Anxiety screening: How many days in the last 2 weeks you were  1. Feeling anxious, nervous, on edge: none  2. Unable to stop worrying: none  3. Worrying too much about different things: none  4. Having problems relaxing:none  5. Feeling restless or unable to sit still:none  6. Feeling irritable or easely annoyed: none  7. Being afraid that something awful might happen: none    Patient had been compliant with daily use of Venlafaxine.     Depression screening PHQ9:  Over the past 2 weeks how often have you been bothered by  1. Lack of interest or pleasuer in usual activities: none  2. Feeling down, depressed, hopeless:none  3. Troubles falling asleep/sleeping too long:none  4. Feeling tired, having little energy: none  5. Poor appetite or overeating: none  6. Feeling bad about yourself:none.  7. Trouble concentrating: at the baseline.  8. Moving or speaking too slow or much faster than usual: none  9. Thoughts about harming yourself:none.    Cognitive impairment screening:   Do you have difficulties with:  1. Language: no  2. Behavior: no  3. Learning/retaining new information: no  4. Handling complex tasks: no  5. Reasoning: no  6. Spacial ability and orientation: no    Hearing: normal.  Driving: unrestricted  ADL: independent  ADL details: Does patient needs help " "with:  telephone use: no  Transportation: no  Housework: no  Shopping: no  meal preparation: no  laundry: no  managing medication:no  Participate in the social activities: Y    Fall risk factors:   1.Use of alcohol: yes    2.Polypharmacy: no  3.H/o of previous fall:  no  4. Mobility impairment:  no  5. Visual impairment:  no  6. Deconditioning: yes  7. Use of antihypertensive medication:  yes  8. Use of sedatives: no  9. Use of antidepressant: yes    Home safety:   1.loose rugs: no   2.unfamiliar environment: no   3. Uneven floors: no   4. No grab bars in the bathroom: no  5. No banister on stairs: no      Advanced directives: has forms at home, but had never gotten to complete those. Encouraged to discuss with family and complete Living Will. Encouraged to contact us if any questions..    Co-managers: dermatology , GYN Dr[facility] Mcd[facility]ough at [facility], general surgeon , urologist Dr.Christopher Gerardo        /74 (BP Location: Left arm, Patient Position: Sitting, Cuff Size: Adult)   Resp 14   Ht 159.4 cm (62.75\")   Wt 85.7 kg (189 lb)   BMI 33.75 kg/m²     Current Outpatient Medications:   •  amLODIPine (NORVASC) 5 MG tablet, TAKE 1 TABLET BY MOUTH ONCE DAILY, Disp: 90 tablet, Rfl: 3  •  fluticasone (FLONASE) 50 MCG/ACT nasal spray, 2 sprays into the nostril(s) as directed by provider Daily., Disp: 3 bottle, Rfl: 3  •  montelukast (SINGULAIR) 10 MG tablet, TAKE ONE TABLET BY MOUTH ONCE DAILY AT  NIGHT, Disp: 30 tablet, Rfl: 11  •  triamterene-hydrochlorothiazide (MAXZIDE) 75-50 MG per tablet, Take 0.5 tablets by mouth Daily., Disp: 45 tablet, Rfl: 3  •  venlafaxine XR (EFFEXOR-XR) 150 MG 24 hr capsule, TAKE 1 CAPSULE BY MOUTH ONCE DAILY, Disp: 90 capsule, Rfl: 3    Patient has long-standing benign essential HTN.  BP is usually well controlled with daily use of Ca channel blocker and thiazide diuretic. On low salt diet with good compliance. Takes medication regularly. Denies chest pain, " dyspnea,lightheadedness,  lower extremity edema. Patient does not check blood pressure    Patient had been diagnosed with Vitamin D deficiency. Takes Vit D as a part of MVIs.Questionable compliance lately.  Patient does not have complaints of muscle or bone aches. Balance is good. No recent falls.     Patient also is here for chronic GAS f/u. Takes Venlafaxine daily. Patient is compliant with treatment. Mood had been stable. Takes care of the  after his surgery.    Patient c/o upper back pain. This is a new issue. Started few months ago. The pain is dull, in the upper back and upper arms. Worse in am, when she just gets up from sleep. Heat helps, also had been taking some Tylenol OTC with relief. No h/o trauma.                                  The following portions of the patient's history were reviewed and updated as appropriate: allergies, current medications, past family history, past medical history, past social history, past surgical history and problem list.    Review of Systems   Constitutional: Negative for chills and fever.   HENT: Negative for postnasal drip, sinus pressure and sore throat.    Eyes: Negative for pain and itching.   Respiratory: Negative for cough and chest tightness.    Cardiovascular: Negative for chest pain and leg swelling.   Gastrointestinal: Negative for abdominal pain and blood in stool.   Endocrine: Negative for cold intolerance and heat intolerance.   Genitourinary: Negative for difficulty urinating and flank pain.   Musculoskeletal: Positive for back pain. Negative for neck pain.   Skin: Negative for color change and rash.   Neurological: Positive for headaches. Negative for dizziness and weakness.   Hematological: Negative for adenopathy. Does not bruise/bleed easily.   Psychiatric/Behavioral: Positive for sleep disturbance. The patient is not nervous/anxious.        Objective   Physical Exam   Constitutional: She is oriented to person, place, and time. She appears  well-developed and well-nourished. No distress.   overweight   HENT:   Head: Normocephalic and atraumatic.   Right Ear: External ear normal.   Left Ear: External ear normal.   Nose: Nose normal.   Mouth/Throat: Oropharynx is clear and moist. No oropharyngeal exudate.   Eyes: Conjunctivae and EOM are normal. Pupils are equal, round, and reactive to light. Right eye exhibits no discharge. Left eye exhibits no discharge. No scleral icterus.   Neck: Normal range of motion. Neck supple. No JVD present. No thyromegaly present.   Cardiovascular: Normal rate, regular rhythm, S1 normal, S2 normal, normal heart sounds and intact distal pulses. Exam reveals no gallop and no friction rub.   No murmur heard.  Pulmonary/Chest: Effort normal and breath sounds normal. No respiratory distress. She has no decreased breath sounds. She has no wheezes. She has no rhonchi. She has no rales. Right breast exhibits no inverted nipple, no mass, no nipple discharge, no skin change and no tenderness. Left breast exhibits no inverted nipple, no mass, no nipple discharge, no skin change and no tenderness. Breasts are symmetrical.   Abdominal: Soft. Bowel sounds are normal. She exhibits no distension and no mass. There is no tenderness. There is no rebound and no guarding.   Musculoskeletal: She exhibits tenderness (on palpation both shoulders anteriorly) and deformity (poalpable firm, movable 8 mm  cyst on the palm of the left hand at the base of the index br2htaq, small non-tender cyst 5 mm oever the DIP joint 1st finger right hand). She exhibits no edema.   Lymphadenopathy:        Head (right side): No submental, no submandibular, no preauricular, no posterior auricular and no occipital adenopathy present.        Head (left side): No submental, no submandibular, no preauricular, no posterior auricular and no occipital adenopathy present.     She has no cervical adenopathy.        Right cervical: No superficial cervical, no deep cervical and no  posterior cervical adenopathy present.       Left cervical: No superficial cervical, no deep cervical and no posterior cervical adenopathy present.     She has no axillary adenopathy.        Right: No supraclavicular adenopathy present.        Left: No supraclavicular adenopathy present.   Neurological: She is alert and oriented to person, place, and time. She has normal reflexes. She displays normal reflexes. No cranial nerve deficit. She exhibits normal muscle tone. Coordination normal.   Reflex Scores:       Bicep reflexes are 2+ on the right side and 2+ on the left side.       Patellar reflexes are 2+ on the right side and 2+ on the left side.  Skin: Skin is warm. No rash noted. She is not diaphoretic. No erythema.   Psychiatric: She has a normal mood and affect. Her behavior is normal. Thought content normal.   Vitals reviewed.      Assessment/Plan    was seen today for medicare wellness-subsequent, hypertension, vitamin d deficiency and anxiety.    Diagnoses and all orders for this visit:    Health care maintenance    Benign essential hypertension  -     Comprehensive Metabolic Panel; Future    Vitamin D deficiency  -     Vitamin D 25 Hydroxy; Future    Generalized anxiety disorder    Post-menopause  -     DEXA Bone Density Axial; Future        Annual wellness visit with preventive exam as well as age and risk appropriate councelling completed.    Cardiovascular disease councelling: current. Recommended: Excellent cholesterol.  Diabetes screening and councelling: current. Recommended: Not at risk for diabetes.  Breast cancer screening: up to date. Recommended frequency and time of the next screening per GYN..  Osteoporosis screening: is due, will schedule. Benefits explained..  Glaucoma screening: up to date.   AAA screening: not needed..  Hep C screening (born between [birth year]) completed..  Colorectal cancer screening: up to date. Recommended every 10 years. Next screening is due in  2026..    Immunizations:   1. Influenza vaccine  is up to date and recommended yearly.   2. Pneumococcal vaccines: completed.   3. Shingles prevention:  Zostavax completed, recommended to get new shingles vaccine Shindrix at [facility], benefits explained.      Advanced directives planning: not completed. The purpose and whole concept of Living Will explained. I had encouraged patient to discuss the issue with family and document personal wishes and preferences in a form of Living Will..    Medications reviewed and medication list updated.   Potential harmful drug-disease interactions in the elderly:none.  High risk medication in elderly: none  ASA use: no indications.    HTN - well controlled with current medication regimen. Normal kidney tests and electrolytes. Recommended low salt diet. Continue same medical treatment.  Vitamin D deficiency - well compensated with over the counter Vit D3. Continue same dose. This supplement is fat-soluble and has to be taken with meals.  Anxiety - well controlled with Venlafaxine, doing well keeping up with taking care of her , who just had neck surgery.  Upper back pain - most likely arthritis, will try Meloxicam 15 mg once a day.  Ganglion cyst - under the care of .

## 2019-09-12 ENCOUNTER — LAB (OUTPATIENT)
Dept: INTERNAL MEDICINE | Facility: CLINIC | Age: 69
End: 2019-09-12

## 2019-09-12 DIAGNOSIS — I10 BENIGN ESSENTIAL HYPERTENSION: ICD-10-CM

## 2019-09-12 DIAGNOSIS — E55.9 VITAMIN D DEFICIENCY: ICD-10-CM

## 2019-09-12 LAB
25(OH)D3 SERPL-MCNC: 31.1 NG/ML (ref 30–100)
ALBUMIN SERPL-MCNC: 4.1 G/DL (ref 3.5–5.2)
ALBUMIN/GLOB SERPL: 1.6 G/DL
ALP SERPL-CCNC: 83 U/L (ref 39–117)
ALT SERPL W P-5'-P-CCNC: 16 U/L (ref 1–33)
ANION GAP SERPL CALCULATED.3IONS-SCNC: 14.1 MMOL/L (ref 5–15)
AST SERPL-CCNC: 16 U/L (ref 1–32)
BILIRUB SERPL-MCNC: 0.5 MG/DL (ref 0.2–1.2)
BUN BLD-MCNC: 13 MG/DL (ref 8–23)
BUN/CREAT SERPL: 22.8 (ref 7–25)
CALCIUM SPEC-SCNC: 9.5 MG/DL (ref 8.6–10.5)
CHLORIDE SERPL-SCNC: 99 MMOL/L (ref 98–107)
CO2 SERPL-SCNC: 27.9 MMOL/L (ref 22–29)
CREAT BLD-MCNC: 0.57 MG/DL (ref 0.57–1)
GFR SERPL CREATININE-BSD FRML MDRD: 105 ML/MIN/1.73
GLOBULIN UR ELPH-MCNC: 2.6 GM/DL
GLUCOSE BLD-MCNC: 92 MG/DL (ref 65–99)
POTASSIUM BLD-SCNC: 3.3 MMOL/L (ref 3.5–5.2)
PROT SERPL-MCNC: 6.7 G/DL (ref 6–8.5)
SODIUM BLD-SCNC: 141 MMOL/L (ref 136–145)

## 2019-09-12 PROCEDURE — 80053 COMPREHEN METABOLIC PANEL: CPT | Performed by: INTERNAL MEDICINE

## 2019-09-12 PROCEDURE — 36415 COLL VENOUS BLD VENIPUNCTURE: CPT | Performed by: INTERNAL MEDICINE

## 2019-09-12 PROCEDURE — 82306 VITAMIN D 25 HYDROXY: CPT | Performed by: INTERNAL MEDICINE

## 2019-09-19 ENCOUNTER — OFFICE VISIT (OUTPATIENT)
Dept: INTERNAL MEDICINE | Facility: CLINIC | Age: 69
End: 2019-09-19

## 2019-09-19 VITALS
DIASTOLIC BLOOD PRESSURE: 72 MMHG | HEIGHT: 63 IN | BODY MASS INDEX: 35.61 KG/M2 | SYSTOLIC BLOOD PRESSURE: 124 MMHG | RESPIRATION RATE: 14 BRPM | WEIGHT: 201 LBS

## 2019-09-19 DIAGNOSIS — E87.6 HYPOKALEMIA: ICD-10-CM

## 2019-09-19 DIAGNOSIS — D72.829 LEUKOCYTOSIS, UNSPECIFIED TYPE: ICD-10-CM

## 2019-09-19 DIAGNOSIS — E55.9 VITAMIN D DEFICIENCY: ICD-10-CM

## 2019-09-19 DIAGNOSIS — J30.9 ALLERGIC SINUSITIS: ICD-10-CM

## 2019-09-19 DIAGNOSIS — J30.1 NON-SEASONAL ALLERGIC RHINITIS DUE TO POLLEN: ICD-10-CM

## 2019-09-19 DIAGNOSIS — I10 BENIGN ESSENTIAL HYPERTENSION: Primary | ICD-10-CM

## 2019-09-19 PROCEDURE — 99214 OFFICE O/P EST MOD 30 MIN: CPT | Performed by: INTERNAL MEDICINE

## 2019-09-19 RX ORDER — FLUTICASONE PROPIONATE 50 MCG
2 SPRAY, SUSPENSION (ML) NASAL DAILY
Qty: 3 BOTTLE | Refills: 3 | Status: SHIPPED | OUTPATIENT
Start: 2019-09-19 | End: 2020-12-04 | Stop reason: SDUPTHER

## 2019-09-19 RX ORDER — TRIAMTERENE AND HYDROCHLOROTHIAZIDE 75; 50 MG/1; MG/1
0.5 TABLET ORAL DAILY
Qty: 45 TABLET | Refills: 3 | Status: SHIPPED | OUTPATIENT
Start: 2019-09-19 | End: 2020-10-12 | Stop reason: SDUPTHER

## 2019-09-19 RX ORDER — POTASSIUM CHLORIDE 600 MG/1
8 TABLET, FILM COATED, EXTENDED RELEASE ORAL DAILY
Qty: 90 TABLET | Refills: 3 | Status: SHIPPED | OUTPATIENT
Start: 2019-09-19 | End: 2020-12-04 | Stop reason: SDUPTHER

## 2019-09-19 NOTE — PATIENT INSTRUCTIONS
HTN - well controlled with current medication regimen. Normal kidney tests. Recommended low salt diet. Continue same medical treatment.  Hypokalemia - due to the use of diuretic - will start low dose of potassium supplement.  Vitamin D deficiency - well compensated with over the counter Vit D3. Continue same dose. This supplement is fat-soluble and has to be taken with meals.

## 2019-09-19 NOTE — PROGRESS NOTES
"Subjective   Mary Cueva is a 69 y.o. female.     History of Present Illness     /72 (BP Location: Left arm, Patient Position: Sitting, Cuff Size: Adult)   Resp 14   Ht 159.4 cm (62.75\")   Wt 91.2 kg (201 lb)   BMI 35.89 kg/m²     Current Outpatient Medications:   •  amLODIPine (NORVASC) 5 MG tablet, TAKE 1 TABLET BY MOUTH ONCE DAILY, Disp: 90 tablet, Rfl: 3  •  fluticasone (FLONASE) 50 MCG/ACT nasal spray, 2 sprays into the nostril(s) as directed by provider Daily., Disp: 3 bottle, Rfl: 3  •  meloxicam (MOBIC) 15 MG tablet, Take 1 tablet by mouth Daily., Disp: 30 tablet, Rfl: 11  •  montelukast (SINGULAIR) 10 MG tablet, TAKE ONE TABLET BY MOUTH ONCE DAILY AT  NIGHT, Disp: 30 tablet, Rfl: 11  •  triamterene-hydrochlorothiazide (MAXZIDE) 75-50 MG per tablet, Take 0.5 tablets by mouth Daily., Disp: 45 tablet, Rfl: 3  •  venlafaxine XR (EFFEXOR-XR) 150 MG 24 hr capsule, TAKE 1 CAPSULE BY MOUTH ONCE DAILY, Disp: 90 capsule, Rfl: 3    Patient has long-standing benign essential HTN.  BP is usually well controlled with daily use of Ca channel blocker and thiazide diuretic. On low salt diet with good compliance. Takes medication regularly. Denies chest pain, dyspnea,lightheadedness,  lower extremity edema. Patient does not check blood pressure    Patient had been diagnosed with Vitamin D deficiency. Takes over the counter Vit D3 400 IU a day. Patient does not have complaints of muscle or bone aches. Balance is good. No recent falls.       The following portions of the patient's history were reviewed and updated as appropriate: allergies, current medications, past family history, past medical history, past social history, past surgical history and problem list.    Review of Systems   Constitutional: Negative for chills and fever.   Eyes: Negative for pain and redness.   Respiratory: Negative for cough and shortness of breath.    Cardiovascular: Negative for chest pain and leg swelling.   Neurological: Negative " for dizziness and headaches.       Objective   Physical Exam   Constitutional: She is oriented to person, place, and time. She appears well-developed.   overweight   HENT:   Head: Normocephalic and atraumatic.   Right Ear: Tympanic membrane, external ear and ear canal normal.   Left Ear: Tympanic membrane, external ear and ear canal normal.   Nose: Nose normal. Right sinus exhibits no maxillary sinus tenderness and no frontal sinus tenderness. Left sinus exhibits no maxillary sinus tenderness and no frontal sinus tenderness.   Mouth/Throat: Uvula is midline, oropharynx is clear and moist and mucous membranes are normal.   Eyes: Conjunctivae and EOM are normal. Pupils are equal, round, and reactive to light. Right eye exhibits no discharge. Left eye exhibits no discharge. No scleral icterus.   Neck: Neck supple. No JVD present.   Cardiovascular: Normal rate, regular rhythm and normal heart sounds. Exam reveals no gallop and no friction rub.   No murmur heard.  Pulmonary/Chest: Effort normal and breath sounds normal. She has no wheezes. She has no rales.   Musculoskeletal: She exhibits no edema.   Lymphadenopathy:     She has no cervical adenopathy.   Neurological: She is alert and oriented to person, place, and time. No cranial nerve deficit.   Skin: Skin is warm and dry. No rash noted.   Psychiatric: She has a normal mood and affect. Her behavior is normal.   Vitals reviewed.      Assessment/Plan    was seen today for hypertension and vitamin d deficiency.    Diagnoses and all orders for this visit:    Benign essential hypertension  -     Comprehensive Metabolic Panel; Future  -     Lipid Panel; Future  -     TSH; Future  -     Urinalysis With Microscopic If Indicated (No Culture) - Urine, Clean Catch; Future    Leukocytosis, unspecified type  -     CBC & Differential; Future    Vitamin D deficiency  -     Comprehensive Metabolic Panel; Future  -     Vitamin D 25 Hydroxy; Future    Hypokalemia  -     potassium  chloride (KLOR-CON) 8 MEQ CR tablet; Take 1 tablet by mouth Daily.      HTN - well controlled with current medication regimen. Normal kidney tests. Recommended low salt diet. Continue same medical treatment.  Hypokalemia - due to the use of diuretic - will start low dose of potassium supplement.  Vitamin D deficiency - well compensated with over the counter Vit D3. Continue same dose. This supplement is fat-soluble and has to be taken with meals.

## 2019-10-21 ENCOUNTER — TELEPHONE (OUTPATIENT)
Dept: INTERNAL MEDICINE | Facility: CLINIC | Age: 69
End: 2019-10-21

## 2019-10-21 NOTE — TELEPHONE ENCOUNTER
[facility] pharmacy called to get a verbal okay from  about a possible reaction between her potassium chloride (KLOR-CON) 8 MEQ CR tablet and triamterene-hydrochlorothiazide (MAXZIDE) 75-50 MG per tablet medications. It could possibly cause hyperkalemia. I spoke with  to make her aware and she gave the okay for them to fill the medication. I gave the verbal okay to the pharmacist.

## 2019-10-23 ENCOUNTER — CLINICAL SUPPORT (OUTPATIENT)
Dept: INTERNAL MEDICINE | Facility: CLINIC | Age: 69
End: 2019-10-23

## 2019-10-23 DIAGNOSIS — Z78.0 POST-MENOPAUSE: ICD-10-CM

## 2019-10-23 PROCEDURE — 77080 DXA BONE DENSITY AXIAL: CPT | Performed by: INTERNAL MEDICINE

## 2020-01-03 ENCOUNTER — OFFICE VISIT (OUTPATIENT)
Dept: INTERNAL MEDICINE | Facility: CLINIC | Age: 70
End: 2020-01-03

## 2020-01-03 VITALS
TEMPERATURE: 97.7 F | DIASTOLIC BLOOD PRESSURE: 88 MMHG | OXYGEN SATURATION: 98 % | WEIGHT: 198 LBS | BODY MASS INDEX: 35.08 KG/M2 | HEART RATE: 120 BPM | SYSTOLIC BLOOD PRESSURE: 134 MMHG | HEIGHT: 63 IN

## 2020-01-03 DIAGNOSIS — J01.00 ACUTE NON-RECURRENT MAXILLARY SINUSITIS: Primary | ICD-10-CM

## 2020-01-03 PROCEDURE — 99213 OFFICE O/P EST LOW 20 MIN: CPT | Performed by: INTERNAL MEDICINE

## 2020-01-03 RX ORDER — BENZONATATE 200 MG/1
200 CAPSULE ORAL 3 TIMES DAILY PRN
Qty: 30 CAPSULE | Refills: 2 | Status: SHIPPED | OUTPATIENT
Start: 2020-01-03 | End: 2020-06-02

## 2020-01-03 RX ORDER — AMOXICILLIN AND CLAVULANATE POTASSIUM 500; 125 MG/1; MG/1
1 TABLET, FILM COATED ORAL 2 TIMES DAILY
Qty: 20 TABLET | Refills: 0 | Status: SHIPPED | OUTPATIENT
Start: 2020-01-03 | End: 2020-06-02

## 2020-01-03 NOTE — PROGRESS NOTES
"Subjective   Mary Cueva is a 69 y.o. female.     History of Present Illness   /88   Pulse 120   Temp 97.7 °F (36.5 °C)   Ht 159.4 cm (62.75\")   Wt 89.8 kg (198 lb)   SpO2 98%   BMI 35.35 kg/m²   Patient complains of nasal congestion, sore throat and dry cough. S-ms started 10 days ago ago.Patient describes cough as productive of green sputum. Associated symptoms include headache and sinus pressure. There is some pain in the maxillery sinuses. Symptoms get worse  no apparent reason. Patient has fatigue. Slept till 1:30 pm yesterday. Patient has had no wheezing.   Patient has had ID contacts. Same in her .  Overall s-ms had been increasing. Patient had tried to use Mucinex DM without improvement.      Current Outpatient Medications:   •  amLODIPine (NORVASC) 5 MG tablet, TAKE 1 TABLET BY MOUTH ONCE DAILY, Disp: 90 tablet, Rfl: 3  •  fluticasone (FLONASE) 50 MCG/ACT nasal spray, 2 sprays into the nostril(s) as directed by provider Daily., Disp: 3 bottle, Rfl: 3  •  meloxicam (MOBIC) 15 MG tablet, Take 1 tablet by mouth Daily., Disp: 30 tablet, Rfl: 11  •  montelukast (SINGULAIR) 10 MG tablet, TAKE ONE TABLET BY MOUTH ONCE DAILY AT  NIGHT, Disp: 30 tablet, Rfl: 11  •  potassium chloride (KLOR-CON) 8 MEQ CR tablet, Take 1 tablet by mouth Daily., Disp: 90 tablet, Rfl: 3  •  triamterene-hydrochlorothiazide (MAXZIDE) 75-50 MG per tablet, Take 0.5 tablets by mouth Daily., Disp: 45 tablet, Rfl: 3  •  venlafaxine XR (EFFEXOR-XR) 150 MG 24 hr capsule, TAKE 1 CAPSULE BY MOUTH ONCE DAILY, Disp: 90 capsule, Rfl: 3  The following portions of the patient's history were reviewed and updated as appropriate: allergies, current medications, past family history, past medical history, past social history, past surgical history and problem list.    Review of Systems   Constitutional: Negative for chills and fever.   HENT: Positive for congestion, postnasal drip and sore throat.    Eyes: Negative for pain and redness. "   Respiratory: Positive for cough. Negative for shortness of breath.    Cardiovascular: Negative for chest pain and leg swelling.   Neurological: Negative for dizziness and headaches.       Objective   Physical Exam   Constitutional: She is oriented to person, place, and time. She appears well-developed. No distress.   obese   HENT:   Head: Normocephalic and atraumatic.   Right Ear: Tympanic membrane, external ear and ear canal normal. No tenderness. No mastoid tenderness. Tympanic membrane is not injected. No middle ear effusion.   Left Ear: Tympanic membrane, external ear and ear canal normal. No tenderness. No mastoid tenderness. Tympanic membrane is not injected.  No middle ear effusion.   Nose: Mucosal edema and rhinorrhea present. No sinus tenderness. Right sinus exhibits maxillary sinus tenderness. Right sinus exhibits no frontal sinus tenderness. Left sinus exhibits maxillary sinus tenderness. Left sinus exhibits no frontal sinus tenderness.   Mouth/Throat: Uvula is midline and mucous membranes are normal. No oral lesions. Posterior oropharyngeal erythema present. No oropharyngeal exudate.   Eyes: Pupils are equal, round, and reactive to light. Conjunctivae and lids are normal. Right eye exhibits no discharge. Left eye exhibits no discharge. No scleral icterus.   Neck: Neck supple. No thyromegaly present.   Cardiovascular: Normal rate and regular rhythm.   Pulmonary/Chest: Effort normal. No accessory muscle usage. No tachypnea and no bradypnea. No respiratory distress. She has no decreased breath sounds. She has no wheezes. She has no rales. She exhibits no tenderness.   Lymphadenopathy:        Head (right side): No submental, no submandibular, no preauricular, no posterior auricular and no occipital adenopathy present.        Head (left side): No submental, no submandibular, no preauricular, no posterior auricular and no occipital adenopathy present.     She has no cervical adenopathy.        Right  cervical: No superficial cervical, no deep cervical and no posterior cervical adenopathy present.       Left cervical: No superficial cervical, no deep cervical and no posterior cervical adenopathy present.        Right: No supraclavicular adenopathy present.        Left: No supraclavicular adenopathy present.   Neurological: She is alert and oriented to person, place, and time.   Skin: Skin is warm. She is not diaphoretic.   Psychiatric: She has a normal mood and affect. Her behavior is normal.   Vitals reviewed.      Assessment/Plan    was seen today for sore throat and nasal congestion.    Diagnoses and all orders for this visit:    Acute non-recurrent maxillary sinusitis  -     amoxicillin-clavulanate (AUGMENTIN) 500-125 MG per tablet; Take 1 tablet by mouth 2 (Two) Times a Day.  -     benzonatate (TESSALON) 200 MG capsule; Take 1 capsule by mouth 3 (Three) Times a Day As Needed for Cough.

## 2020-01-20 DIAGNOSIS — J01.01 ACUTE RECURRENT MAXILLARY SINUSITIS: ICD-10-CM

## 2020-01-20 DIAGNOSIS — F41.8 ANXIETY ASSOCIATED WITH DEPRESSION: ICD-10-CM

## 2020-01-20 DIAGNOSIS — I10 BENIGN ESSENTIAL HYPERTENSION: ICD-10-CM

## 2020-01-20 RX ORDER — VENLAFAXINE HYDROCHLORIDE 150 MG/1
CAPSULE, EXTENDED RELEASE ORAL
Qty: 90 CAPSULE | Refills: 0 | Status: SHIPPED | OUTPATIENT
Start: 2020-01-20 | End: 2020-04-23

## 2020-01-20 RX ORDER — MONTELUKAST SODIUM 10 MG/1
TABLET ORAL
Qty: 30 TABLET | Refills: 0 | Status: SHIPPED | OUTPATIENT
Start: 2020-01-20 | End: 2020-03-17

## 2020-01-20 RX ORDER — AMLODIPINE BESYLATE 5 MG/1
TABLET ORAL
Qty: 90 TABLET | Refills: 0 | Status: SHIPPED | OUTPATIENT
Start: 2020-01-20 | End: 2020-04-14

## 2020-03-13 ENCOUNTER — LAB (OUTPATIENT)
Dept: INTERNAL MEDICINE | Facility: CLINIC | Age: 70
End: 2020-03-13

## 2020-03-13 DIAGNOSIS — I10 BENIGN ESSENTIAL HYPERTENSION: Primary | ICD-10-CM

## 2020-03-13 DIAGNOSIS — E55.9 VITAMIN D DEFICIENCY: ICD-10-CM

## 2020-03-13 DIAGNOSIS — R82.90 ABNORMAL URINE FINDINGS: ICD-10-CM

## 2020-03-13 DIAGNOSIS — D72.829 LEUKOCYTOSIS, UNSPECIFIED TYPE: ICD-10-CM

## 2020-03-13 LAB
25(OH)D3 SERPL-MCNC: 28.7 NG/ML (ref 30–100)
ALBUMIN SERPL-MCNC: 4.4 G/DL (ref 3.5–5.2)
ALBUMIN/GLOB SERPL: 1.4 G/DL
ALP SERPL-CCNC: 98 U/L (ref 39–117)
ALT SERPL W P-5'-P-CCNC: 13 U/L (ref 1–33)
ANION GAP SERPL CALCULATED.3IONS-SCNC: 14.7 MMOL/L (ref 5–15)
AST SERPL-CCNC: 13 U/L (ref 1–32)
BACTERIA UR QL AUTO: ABNORMAL /HPF
BASOPHILS # BLD AUTO: 0.05 10*3/MM3 (ref 0–0.2)
BASOPHILS NFR BLD AUTO: 0.4 % (ref 0–1.5)
BILIRUB SERPL-MCNC: 0.5 MG/DL (ref 0.2–1.2)
BILIRUB UR QL STRIP: NEGATIVE
BUN BLD-MCNC: 16 MG/DL (ref 8–23)
BUN/CREAT SERPL: 20.3 (ref 7–25)
CALCIUM SPEC-SCNC: 9.2 MG/DL (ref 8.6–10.5)
CHLORIDE SERPL-SCNC: 97 MMOL/L (ref 98–107)
CHOLEST SERPL-MCNC: 200 MG/DL (ref 0–200)
CLARITY UR: ABNORMAL
CO2 SERPL-SCNC: 26.3 MMOL/L (ref 22–29)
COLOR UR: YELLOW
CREAT BLD-MCNC: 0.79 MG/DL (ref 0.57–1)
DEPRECATED RDW RBC AUTO: 46.3 FL (ref 37–54)
EOSINOPHIL # BLD AUTO: 0.32 10*3/MM3 (ref 0–0.4)
EOSINOPHIL NFR BLD AUTO: 2.4 % (ref 0.3–6.2)
ERYTHROCYTE [DISTWIDTH] IN BLOOD BY AUTOMATED COUNT: 13.9 % (ref 12.3–15.4)
GFR SERPL CREATININE-BSD FRML MDRD: 72 ML/MIN/1.73
GLOBULIN UR ELPH-MCNC: 3.2 GM/DL
GLUCOSE BLD-MCNC: 119 MG/DL (ref 65–99)
GLUCOSE UR STRIP-MCNC: NEGATIVE MG/DL
HCT VFR BLD AUTO: 47.6 % (ref 34–46.6)
HDLC SERPL-MCNC: 73 MG/DL (ref 40–60)
HGB BLD-MCNC: 15.8 G/DL (ref 12–15.9)
HGB UR QL STRIP.AUTO: ABNORMAL
HYALINE CASTS UR QL AUTO: ABNORMAL /LPF
KETONES UR QL STRIP: NEGATIVE
LDLC SERPL CALC-MCNC: 105 MG/DL (ref 0–100)
LDLC/HDLC SERPL: 1.44 {RATIO}
LEUKOCYTE ESTERASE UR QL STRIP.AUTO: ABNORMAL
LYMPHOCYTES # BLD AUTO: 3.08 10*3/MM3 (ref 0.7–3.1)
LYMPHOCYTES NFR BLD AUTO: 22.7 % (ref 19.6–45.3)
MCH RBC QN AUTO: 31 PG (ref 26.6–33)
MCHC RBC AUTO-ENTMCNC: 33.2 G/DL (ref 31.5–35.7)
MCV RBC AUTO: 93.5 FL (ref 79–97)
MONOCYTES # BLD AUTO: 0.75 10*3/MM3 (ref 0.1–0.9)
MONOCYTES NFR BLD AUTO: 5.5 % (ref 5–12)
MUCOUS THREADS URNS QL MICRO: ABNORMAL /HPF
NEUTROPHILS # BLD AUTO: 9.39 10*3/MM3 (ref 1.7–7)
NEUTROPHILS NFR BLD AUTO: 69 % (ref 42.7–76)
NITRITE UR QL STRIP: NEGATIVE
PH UR STRIP.AUTO: 5.5 [PH] (ref 5–8)
PLATELET # BLD AUTO: 346 10*3/MM3 (ref 140–450)
PMV BLD AUTO: 10.8 FL (ref 6–12)
POTASSIUM BLD-SCNC: 3.3 MMOL/L (ref 3.5–5.2)
PROT SERPL-MCNC: 7.6 G/DL (ref 6–8.5)
PROT UR QL STRIP: NEGATIVE
RBC # BLD AUTO: 5.09 10*6/MM3 (ref 3.77–5.28)
RBC # UR: ABNORMAL /HPF
REF LAB TEST METHOD: ABNORMAL
SODIUM BLD-SCNC: 138 MMOL/L (ref 136–145)
SP GR UR STRIP: 1.02 (ref 1–1.03)
SQUAMOUS #/AREA URNS HPF: ABNORMAL /HPF
TRIGL SERPL-MCNC: 109 MG/DL (ref 0–150)
TSH SERPL DL<=0.05 MIU/L-ACNC: 3.04 UIU/ML (ref 0.27–4.2)
UROBILINOGEN UR QL STRIP: ABNORMAL
VLDLC SERPL-MCNC: 21.8 MG/DL (ref 5–40)
WBC NRBC COR # BLD: 13.59 10*3/MM3 (ref 3.4–10.8)
WBC UR QL AUTO: ABNORMAL /HPF

## 2020-03-13 PROCEDURE — 80053 COMPREHEN METABOLIC PANEL: CPT | Performed by: INTERNAL MEDICINE

## 2020-03-13 PROCEDURE — 87086 URINE CULTURE/COLONY COUNT: CPT | Performed by: INTERNAL MEDICINE

## 2020-03-13 PROCEDURE — 82306 VITAMIN D 25 HYDROXY: CPT | Performed by: INTERNAL MEDICINE

## 2020-03-13 PROCEDURE — 36415 COLL VENOUS BLD VENIPUNCTURE: CPT | Performed by: INTERNAL MEDICINE

## 2020-03-13 PROCEDURE — 80061 LIPID PANEL: CPT | Performed by: INTERNAL MEDICINE

## 2020-03-13 PROCEDURE — 87088 URINE BACTERIA CULTURE: CPT | Performed by: INTERNAL MEDICINE

## 2020-03-13 PROCEDURE — 84443 ASSAY THYROID STIM HORMONE: CPT | Performed by: INTERNAL MEDICINE

## 2020-03-13 PROCEDURE — 81001 URINALYSIS AUTO W/SCOPE: CPT | Performed by: INTERNAL MEDICINE

## 2020-03-13 PROCEDURE — 85025 COMPLETE CBC W/AUTO DIFF WBC: CPT | Performed by: INTERNAL MEDICINE

## 2020-03-13 PROCEDURE — 87186 SC STD MICRODIL/AGAR DIL: CPT | Performed by: INTERNAL MEDICINE

## 2020-03-15 LAB
BACTERIA SPEC AEROBE CULT: ABNORMAL
BACTERIA SPEC AEROBE CULT: ABNORMAL

## 2020-03-16 RX ORDER — CIPROFLOXACIN 250 MG/1
250 TABLET, FILM COATED ORAL 2 TIMES DAILY
Qty: 6 TABLET | Refills: 0 | Status: SHIPPED | OUTPATIENT
Start: 2020-03-16 | End: 2020-03-19

## 2020-03-17 DIAGNOSIS — J01.01 ACUTE RECURRENT MAXILLARY SINUSITIS: ICD-10-CM

## 2020-03-17 RX ORDER — MONTELUKAST SODIUM 10 MG/1
TABLET ORAL
Qty: 30 TABLET | Refills: 3 | Status: SHIPPED | OUTPATIENT
Start: 2020-03-17 | End: 2020-08-20 | Stop reason: SDUPTHER

## 2020-04-14 DIAGNOSIS — G89.29 UPPER BACK PAIN, CHRONIC: ICD-10-CM

## 2020-04-14 DIAGNOSIS — I10 BENIGN ESSENTIAL HYPERTENSION: ICD-10-CM

## 2020-04-14 DIAGNOSIS — M54.9 UPPER BACK PAIN, CHRONIC: ICD-10-CM

## 2020-04-14 RX ORDER — AMLODIPINE BESYLATE 5 MG/1
TABLET ORAL
Qty: 90 TABLET | Refills: 3 | Status: SHIPPED | OUTPATIENT
Start: 2020-04-14 | End: 2021-04-23 | Stop reason: SDUPTHER

## 2020-04-14 RX ORDER — MELOXICAM 15 MG/1
TABLET ORAL
Qty: 90 TABLET | Refills: 3 | Status: SHIPPED | OUTPATIENT
Start: 2020-04-14 | End: 2021-06-21

## 2020-04-23 DIAGNOSIS — F41.8 ANXIETY ASSOCIATED WITH DEPRESSION: ICD-10-CM

## 2020-04-23 RX ORDER — VENLAFAXINE HYDROCHLORIDE 150 MG/1
CAPSULE, EXTENDED RELEASE ORAL
Qty: 90 CAPSULE | Refills: 0 | Status: SHIPPED | OUTPATIENT
Start: 2020-04-23 | End: 2020-06-02 | Stop reason: SDUPTHER

## 2020-06-02 ENCOUNTER — OFFICE VISIT (OUTPATIENT)
Dept: INTERNAL MEDICINE | Facility: CLINIC | Age: 70
End: 2020-06-02

## 2020-06-02 VITALS
WEIGHT: 197 LBS | BODY MASS INDEX: 34.91 KG/M2 | HEART RATE: 115 BPM | DIASTOLIC BLOOD PRESSURE: 84 MMHG | OXYGEN SATURATION: 98 % | HEIGHT: 63 IN | SYSTOLIC BLOOD PRESSURE: 140 MMHG

## 2020-06-02 DIAGNOSIS — I10 BENIGN ESSENTIAL HYPERTENSION: ICD-10-CM

## 2020-06-02 DIAGNOSIS — E66.09 CLASS 2 OBESITY DUE TO EXCESS CALORIES WITHOUT SERIOUS COMORBIDITY WITH BODY MASS INDEX (BMI) OF 35.0 TO 35.9 IN ADULT: ICD-10-CM

## 2020-06-02 DIAGNOSIS — D72.9 NEUTROPHILIC LEUKOCYTOSIS: ICD-10-CM

## 2020-06-02 DIAGNOSIS — Z00.00 HEALTH CARE MAINTENANCE: Primary | ICD-10-CM

## 2020-06-02 DIAGNOSIS — F41.8 ANXIETY ASSOCIATED WITH DEPRESSION: ICD-10-CM

## 2020-06-02 DIAGNOSIS — E55.9 VITAMIN D DEFICIENCY: ICD-10-CM

## 2020-06-02 DIAGNOSIS — E87.6 HYPOKALEMIA: ICD-10-CM

## 2020-06-02 PROBLEM — D72.828 NEUTROPHILIC LEUKOCYTOSIS: Status: ACTIVE | Noted: 2018-02-27

## 2020-06-02 PROCEDURE — 99214 OFFICE O/P EST MOD 30 MIN: CPT | Performed by: INTERNAL MEDICINE

## 2020-06-02 PROCEDURE — G0439 PPPS, SUBSEQ VISIT: HCPCS | Performed by: INTERNAL MEDICINE

## 2020-06-02 RX ORDER — VENLAFAXINE HYDROCHLORIDE 150 MG/1
150 CAPSULE, EXTENDED RELEASE ORAL DAILY
Qty: 90 CAPSULE | Refills: 3
Start: 2020-06-02 | End: 2020-08-06 | Stop reason: SDUPTHER

## 2020-06-02 NOTE — PROGRESS NOTES
"Subjective   Mary Cueva is a 70 y.o. female.     History of Present Illness   /84 (BP Location: Left arm, Patient Position: Sitting, Cuff Size: Adult)   Pulse 115   Ht 159.4 cm (62.75\")   Wt 89.4 kg (197 lb)   SpO2 98%   BMI 35.18 kg/m²   Patient is being seen for subsequent wellness visit.  Hospitalizations in a past: 4, all surgical, none last 3 years.  Once per lifetime Medicare screening tests: ECG - 11/01/2011, nl    AAA risk assessment: 1. FH: none. 2.H/o tobacco smoking: none. 3. CV or PAD: none.    Tobacco use: none   Alcohol use: 4-5 days a week  Illicit drugs use: none  Diet: well balanced  Exercise: none    Anxiety screening: How many days in the last 2 weeks you were  1. Feeling anxious, nervous, on edge: none  2. Unable to stop worrying: none  3. Worrying too much about different things: worries about the risk of elian coronavirus.  4. Having problems relaxing:none  5. Feeling restless or unable to sit still:none  6. Feeling irritable or easely annoyed: none  7. Being afraid that something awful might happen: none    Patient had been compliant with daily use of Venlafaxine, and it controlls her anxiety well.    Depression screening PHQ9:  Over the past 2 weeks how often have you been bothered by  1. Lack of interest or pleasuer in usual activities: none  2. Feeling down, depressed, hopeless:none  3. Troubles falling asleep/sleeping too long:none  4. Feeling tired, having little energy: none  5. Poor appetite or overeating: none  6. Feeling bad about yourself:none.  7. Trouble concentrating: at the baseline.  8. Moving or speaking too slow or much faster than usual: none  9. Thoughts about harming yourself:none.    Cognitive impairment screening:   Do you have difficulties with:  1. Language: no  2. Behavior: no  3. Learning/retaining new information: no  4. Handling complex tasks: no  5. Reasoning: no  6. Spacial ability and orientation: no    Hearing: normal.  Driving: " "unrestricted  ADL: independent  ADL details: Does patient needs help with:  telephone use: no  Transportation: no  Housework: no  Shopping: no  meal preparation: no  laundry: no  managing medication:no  Participate in the social activities: Y    Fall risk factors:   1.Use of alcohol: no    2.Polypharmacy: yes  3.H/o of previous fall:  yes  4. Mobility impairment:  no  5. Visual impairment:  no  6. Deconditioning: yes  7. Use of antihypertensive medication:  yes  8. Use of sedatives: no  9. Use of antidepressant: yes    Home safety:   1.loose rugs: no   2.unfamiliar environment: no   3. Uneven floors: no   4. No grab bars in the bathroom: no  5. No banister on stairs: no      Advanced directives: not completed. The purpose and whole concept of Living Will explained. I had encouraged patient to discuss the issue with family and document personal wishes and preferences in a form of Living Will.WE had discussed this topic several times already, still not done. Advance Care Planning   ACP discussion was held with the patient during this visit. Patient does not have an advance directive, information provided.    Co-managers: dermatology , GYN Dr.Kelly Mcdonough, general surgeon , urologist Dr.Christopher Gerardo        /84 (BP Location: Left arm, Patient Position: Sitting, Cuff Size: Adult)   Pulse 115   Ht 159.4 cm (62.75\")   Wt 89.4 kg (197 lb)   SpO2 98%   BMI 35.18 kg/m²     Current Outpatient Medications:   •  amLODIPine (NORVASC) 5 MG tablet, Take 1 tablet by mouth once daily, Disp: 90 tablet, Rfl: 3  •  amoxicillin-clavulanate (AUGMENTIN) 500-125 MG per tablet, Take 1 tablet by mouth 2 (Two) Times a Day., Disp: 20 tablet, Rfl: 0  •  benzonatate (TESSALON) 200 MG capsule, Take 1 capsule by mouth 3 (Three) Times a Day As Needed for Cough., Disp: 30 capsule, Rfl: 2  •  fluticasone (FLONASE) 50 MCG/ACT nasal spray, 2 sprays into the nostril(s) as directed by provider Daily., Disp: 3 bottle, Rfl: " 3  •  meloxicam (MOBIC) 15 MG tablet, Take 1 tablet by mouth once daily, Disp: 90 tablet, Rfl: 3  •  montelukast (SINGULAIR) 10 MG tablet, TAKE 1 TABLET BY MOUTH ONCE DAILY AT NIGHT, Disp: 30 tablet, Rfl: 3  •  potassium chloride (KLOR-CON) 8 MEQ CR tablet, Take 1 tablet by mouth Daily., Disp: 90 tablet, Rfl: 3  •  triamterene-hydrochlorothiazide (MAXZIDE) 75-50 MG per tablet, Take 0.5 tablets by mouth Daily., Disp: 45 tablet, Rfl: 3  •  venlafaxine XR (EFFEXOR-XR) 150 MG 24 hr capsule, Take 1 capsule by mouth once daily, Disp: 90 capsule, Rfl: 0    Patient has long-standing benign essential HTN.  BP is usually well controlled with daily use of Ca channel blocker and thiazide diuretic. On low salt diet with good compliance. Takes medication regularly. Denies chest pain, dyspnea,lightheadedness,  lower extremity edema. Patient does not check blood pressure    Patient had been diagnosed with Vitamin D deficiency. Takes over the counter Vit D3 400 IU a day. Patient does not have complaints of muscle or bone aches. Balance is fair. Had one recent falls.     Patient also is here for chronic obesity f/u. No recent exercise since her move and start of the coronavirus pandemic.     Patient had been found to have low potassiuum on routine blood test in March 2020. This ws felt to be due to diuretic use. Started on potassium supple,ment and reports good compliance. No palpitations or nausea.                                  The following portions of the patient's history were reviewed and updated as appropriate: allergies, current medications, past family history, past medical history, past social history, past surgical history and problem list.    Review of Systems   Constitutional: Negative for chills and fever.   HENT: Negative for congestion.    Respiratory: Negative for cough and shortness of breath.    Cardiovascular: Negative for chest pain and leg swelling.   Gastrointestinal: Negative for abdominal pain and blood in  stool.   Genitourinary: Negative for difficulty urinating and flank pain.   Musculoskeletal: Positive for back pain and neck pain.   Skin: Negative for color change, rash and wound.   Neurological: Positive for headaches. Negative for dizziness.   Psychiatric/Behavioral: Positive for sleep disturbance. The patient is nervous/anxious.        Objective   Physical Exam   Constitutional: She is oriented to person, place, and time. Vital signs are normal. She appears well-developed. No distress.   obese   HENT:   Head: Normocephalic and atraumatic.   Right Ear: External ear normal.   Left Ear: External ear normal.   Nose: Nose normal. No mucosal edema. Right sinus exhibits no maxillary sinus tenderness and no frontal sinus tenderness. Left sinus exhibits no maxillary sinus tenderness and no frontal sinus tenderness.   Mouth/Throat: Oropharynx is clear and moist. No oropharyngeal exudate.   Eyes: Pupils are equal, round, and reactive to light. Conjunctivae, EOM and lids are normal. Right eye exhibits no discharge. Left eye exhibits no discharge. Right conjunctiva is not injected. Left conjunctiva is not injected. No scleral icterus. Right eye exhibits normal extraocular motion. Left eye exhibits normal extraocular motion.   Neck: Normal range of motion and full passive range of motion without pain. Neck supple. No JVD present. Carotid bruit is not present. No thyromegaly present.   Cardiovascular: Normal rate, regular rhythm, S1 normal, S2 normal, normal heart sounds and intact distal pulses. PMI is not displaced. Exam reveals no gallop and no friction rub.   No murmur heard.  Pulmonary/Chest: Effort normal and breath sounds normal. No accessory muscle usage. No respiratory distress. She has no decreased breath sounds. She has no wheezes. She has no rhonchi. She has no rales.   Abdominal: Soft. Bowel sounds are normal. She exhibits no distension, no pulsatile liver, no fluid wave, no abdominal bruit, no ascites and no  mass. There is no tenderness. There is no rebound and no guarding.   Musculoskeletal: She exhibits no edema or deformity.   Lymphadenopathy:        Head (right side): No submental, no submandibular, no preauricular, no posterior auricular and no occipital adenopathy present.        Head (left side): No submental, no submandibular, no tonsillar, no preauricular, no posterior auricular and no occipital adenopathy present.     She has no cervical adenopathy.        Right cervical: No superficial cervical, no deep cervical and no posterior cervical adenopathy present.       Left cervical: No superficial cervical, no deep cervical and no posterior cervical adenopathy present.        Right: No supraclavicular adenopathy present.        Left: No supraclavicular adenopathy present.   Neurological: She is alert and oriented to person, place, and time. She has normal strength and normal reflexes. She displays normal reflexes. No cranial nerve deficit. She exhibits normal muscle tone. Coordination normal.   Reflex Scores:       Bicep reflexes are 2+ on the right side and 2+ on the left side.       Patellar reflexes are 2+ on the right side and 2+ on the left side.  Skin: Skin is warm and dry. No rash noted. She is not diaphoretic. No erythema.   Psychiatric: She has a normal mood and affect. Her speech is normal and behavior is normal. Thought content normal.   Vitals reviewed.      Assessment/Plan    was seen today for medicare wellness-subsequent, hypertension, vitamin d deficiency and weight check.    Diagnoses and all orders for this visit:    Health care maintenance    Class 2 obesity due to excess calories without serious comorbidity with body mass index (BMI) of 35.0 to 35.9 in adult    Vitamin D deficiency    Benign essential hypertension    Neutrophilic leukocytosis  -     CBC & Differential    Hypokalemia  -     Basic Metabolic Panel    Anxiety associated with depression  -     venlafaxine XR (EFFEXOR-XR) 150 MG  24 hr capsule; Take 1 capsule by mouth Daily.        Annual wellness visit with preventive exam as well as age and risk appropriate councelling completed.    Cardiovascular disease councelling: current. Recommended: Excellent cholesterol.  Diabetes screening and councelling: current. Recommended: Low carb diet recommended. Needs to avoid starches and sweets., Weight loss recommended.  Breast cancer screening: up to date. Recommended frequency and time of the next screening per GYN..  Osteoporosis screening: up to date. Recommended frequency every 3 years. Next DEXA is due in 2021..  Glaucoma screening: up to date.   AAA screening: not needed..  Hep C screening (born between 5[birth year]-4424) completed..  Colorectal cancer screening: up to date. Recommended every 10 years. Next screening is due in 2026..    Immunizations:   1. Influenza vaccine  is up to date and recommended yearly.   2. Pneumococcal vaccines: completed.   3. Shingles prevention: discussed and recommended to get Shindrix at the pharmacy.      Advanced directives planning: not completed. The purpose and whole concept of Living Will explained. I had encouraged patient to discuss the issue with family and document personal wishes and preferences in a form of Living Will.Will provide the forms..    Medications reviewed and medication list updated.   Potential harmful drug-disease interactions in the elderly:none.  High risk medication in elderly: none  ASA use: no indications.    HTN - well controlled with current medication regimen. Normal kidney tests. Recommended low salt diet. Continue same medical treatment.  Impaired fasting blood sugar - Low carb diet and regular exercise recommended. Weight loss will be very beneficial.   Vitamin D deficiency - not well compensated with over the counter Vit D3. Continue same dose, but needs to take supplement every day. This supplement is fat-soluble and has to be taken with meals.  Neutrophilic leukocytosis - will  recheck CBC.  Hypokalemia - most likely due to diuretic use. started on potassium supplement in March after the labs had revealed low potassium. Good compliance - will check blood potassium level.  Obesity - current BMI is 35.2, has some comorbidities, like impaired fasting lood sugar and OA. Needs to loose weight. Low carb diet recommended.     Answers for HPI/ROS submitted by the patient on 5/31/2020   What is the primary reason for your visit?: Physical

## 2020-06-02 NOTE — PATIENT INSTRUCTIONS
Annual wellness visit with preventive exam as well as age and risk appropriate councelling completed.    Cardiovascular disease councelling: current. Recommended: Excellent cholesterol.  Diabetes screening and councelling: current. Recommended: Low carb diet recommended. Needs to avoid starches and sweets., Weight loss recommended.  Breast cancer screening: up to date. Recommended frequency and time of the next screening per GYN..  Osteoporosis screening: up to date. Recommended frequency every 3 years. Next DEXA is due in 2021..  Glaucoma screening: up to date.   AAA screening: not needed..  Hep C screening (born between 9[birth year]42) completed..  Colorectal cancer screening: up to date. Recommended every 10 years. Next screening is due in 2026..    Immunizations:   1. Influenza vaccine  is up to date and recommended yearly.   2. Pneumococcal vaccines: completed.   3. Shingles prevention: discussed and recommended to get Shindrix at the pharmacy.      Advanced directives planning: not completed. The purpose and whole concept of Living Will explained. I had encouraged patient to discuss the issue with family and document personal wishes and preferences in a form of Living Will.Will provide the forms..    Medications reviewed and medication list updated.   Potential harmful drug-disease interactions in the elderly:none.  High risk medication in elderly: none  ASA use: no indications.    HTN - well controlled with current medication regimen. Normal kidney tests. Recommended low salt diet. Continue same medical treatment.  Impaired fasting blood sugar - Low carb diet and regular exercise recommended. Weight loss will be very beneficial.   Vitamin D deficiency - not well compensated with over the counter Vit D3. Continue same dose, but needs to take supplement every day. This supplement is fat-soluble and has to be taken with meals.  Return in about 6 months (around 12/2/2020) for HTN, leukocytosis, Vit D deficiency.

## 2020-06-03 LAB
BASOPHILS # BLD AUTO: 0.07 10*3/MM3 (ref 0–0.2)
BASOPHILS NFR BLD AUTO: 0.5 % (ref 0–1.5)
BUN SERPL-MCNC: 22 MG/DL (ref 8–23)
BUN/CREAT SERPL: 29.7 (ref 7–25)
CALCIUM SERPL-MCNC: 9.9 MG/DL (ref 8.6–10.5)
CHLORIDE SERPL-SCNC: 101 MMOL/L (ref 98–107)
CO2 SERPL-SCNC: 26.8 MMOL/L (ref 22–29)
CREAT SERPL-MCNC: 0.74 MG/DL (ref 0.57–1)
EOSINOPHIL # BLD AUTO: 0.24 10*3/MM3 (ref 0–0.4)
EOSINOPHIL NFR BLD AUTO: 1.6 % (ref 0.3–6.2)
ERYTHROCYTE [DISTWIDTH] IN BLOOD BY AUTOMATED COUNT: 13.3 % (ref 12.3–15.4)
GLUCOSE SERPL-MCNC: 88 MG/DL (ref 65–99)
HCT VFR BLD AUTO: 43.7 % (ref 34–46.6)
HGB BLD-MCNC: 14.9 G/DL (ref 12–15.9)
IMM GRANULOCYTES # BLD AUTO: 0.04 10*3/MM3 (ref 0–0.05)
IMM GRANULOCYTES NFR BLD AUTO: 0.3 % (ref 0–0.5)
LYMPHOCYTES # BLD AUTO: 2.59 10*3/MM3 (ref 0.7–3.1)
LYMPHOCYTES NFR BLD AUTO: 17.7 % (ref 19.6–45.3)
MCH RBC QN AUTO: 31.4 PG (ref 26.6–33)
MCHC RBC AUTO-ENTMCNC: 34.1 G/DL (ref 31.5–35.7)
MCV RBC AUTO: 92.2 FL (ref 79–97)
MONOCYTES # BLD AUTO: 0.89 10*3/MM3 (ref 0.1–0.9)
MONOCYTES NFR BLD AUTO: 6.1 % (ref 5–12)
NEUTROPHILS # BLD AUTO: 10.81 10*3/MM3 (ref 1.7–7)
NEUTROPHILS NFR BLD AUTO: 73.8 % (ref 42.7–76)
NRBC BLD AUTO-RTO: 0 /100 WBC (ref 0–0.2)
PLATELET # BLD AUTO: 340 10*3/MM3 (ref 140–450)
POTASSIUM SERPL-SCNC: 3.8 MMOL/L (ref 3.5–5.2)
RBC # BLD AUTO: 4.74 10*6/MM3 (ref 3.77–5.28)
SODIUM SERPL-SCNC: 139 MMOL/L (ref 136–145)
WBC # BLD AUTO: 14.64 10*3/MM3 (ref 3.4–10.8)

## 2020-06-04 DIAGNOSIS — D72.9 NEUTROPHILIC LEUKOCYTOSIS: Primary | ICD-10-CM

## 2020-06-16 ENCOUNTER — APPOINTMENT (OUTPATIENT)
Dept: WOMENS IMAGING | Facility: HOSPITAL | Age: 70
End: 2020-06-16

## 2020-06-16 PROCEDURE — 77067 SCR MAMMO BI INCL CAD: CPT | Performed by: RADIOLOGY

## 2020-06-16 PROCEDURE — 77063 BREAST TOMOSYNTHESIS BI: CPT | Performed by: RADIOLOGY

## 2020-06-19 ENCOUNTER — CONSULT (OUTPATIENT)
Dept: ONCOLOGY | Facility: CLINIC | Age: 70
End: 2020-06-19

## 2020-06-19 ENCOUNTER — LAB (OUTPATIENT)
Dept: OTHER | Facility: HOSPITAL | Age: 70
End: 2020-06-19

## 2020-06-19 VITALS
DIASTOLIC BLOOD PRESSURE: 81 MMHG | BODY MASS INDEX: 35.33 KG/M2 | RESPIRATION RATE: 16 BRPM | TEMPERATURE: 97.5 F | OXYGEN SATURATION: 97 % | WEIGHT: 199.4 LBS | SYSTOLIC BLOOD PRESSURE: 144 MMHG | HEIGHT: 63 IN | HEART RATE: 93 BPM

## 2020-06-19 DIAGNOSIS — D72.9 NEUTROPHILIC LEUKOCYTOSIS: Primary | ICD-10-CM

## 2020-06-19 DIAGNOSIS — D64.9 ANEMIA, UNSPECIFIED TYPE: ICD-10-CM

## 2020-06-19 LAB
ALBUMIN SERPL-MCNC: 4.4 G/DL (ref 3.5–5.2)
ALBUMIN/GLOB SERPL: 1.5 G/DL
ALP SERPL-CCNC: 93 U/L (ref 39–117)
ALT SERPL W P-5'-P-CCNC: 16 U/L (ref 1–33)
ANION GAP SERPL CALCULATED.3IONS-SCNC: 9 MMOL/L (ref 5–15)
AST SERPL-CCNC: 16 U/L (ref 1–32)
BASOPHILS # BLD AUTO: 0.08 10*3/MM3 (ref 0–0.2)
BASOPHILS NFR BLD AUTO: 0.6 % (ref 0–1.5)
BILIRUB SERPL-MCNC: 0.3 MG/DL (ref 0.1–1.2)
BUN BLD-MCNC: 17 MG/DL (ref 8–23)
BUN/CREAT SERPL: 23.3 (ref 7–25)
CALCIUM SPEC-SCNC: 9.6 MG/DL (ref 8.6–10.5)
CHLORIDE SERPL-SCNC: 99 MMOL/L (ref 98–107)
CO2 SERPL-SCNC: 29 MMOL/L (ref 22–29)
CORTIS SERPL-MCNC: 13.12 MCG/DL
CREAT BLD-MCNC: 0.73 MG/DL (ref 0.57–1)
CRP SERPL-MCNC: 1.18 MG/DL (ref 0–0.5)
DEPRECATED RDW RBC AUTO: 46.3 FL (ref 37–54)
EOSINOPHIL # BLD AUTO: 0.32 10*3/MM3 (ref 0–0.4)
EOSINOPHIL NFR BLD AUTO: 2.5 % (ref 0.3–6.2)
ERYTHROCYTE [DISTWIDTH] IN BLOOD BY AUTOMATED COUNT: 13.6 % (ref 12.3–15.4)
ERYTHROCYTE [SEDIMENTATION RATE] IN BLOOD: 9 MM/HR (ref 0–30)
GFR SERPL CREATININE-BSD FRML MDRD: 79 ML/MIN/1.73
GLOBULIN UR ELPH-MCNC: 3 GM/DL
GLUCOSE BLD-MCNC: 91 MG/DL (ref 65–99)
HCT VFR BLD AUTO: 44.4 % (ref 34–46.6)
HGB BLD-MCNC: 14.8 G/DL (ref 12–15.9)
HOLD SPECIMEN: NORMAL
IMM GRANULOCYTES # BLD AUTO: 0.05 10*3/MM3 (ref 0–0.05)
IMM GRANULOCYTES NFR BLD AUTO: 0.4 % (ref 0–0.5)
LYMPHOCYTES # BLD AUTO: 2.29 10*3/MM3 (ref 0.7–3.1)
LYMPHOCYTES NFR BLD AUTO: 18 % (ref 19.6–45.3)
MCH RBC QN AUTO: 31.1 PG (ref 26.6–33)
MCHC RBC AUTO-ENTMCNC: 33.3 G/DL (ref 31.5–35.7)
MCV RBC AUTO: 93.3 FL (ref 79–97)
MONOCYTES # BLD AUTO: 0.7 10*3/MM3 (ref 0.1–0.9)
MONOCYTES NFR BLD AUTO: 5.5 % (ref 5–12)
NEUTROPHILS # BLD AUTO: 9.3 10*3/MM3 (ref 1.7–7)
NEUTROPHILS NFR BLD AUTO: 73 % (ref 42.7–76)
NRBC BLD AUTO-RTO: 0 /100 WBC (ref 0–0.2)
PLATELET # BLD AUTO: 323 10*3/MM3 (ref 140–450)
PMV BLD AUTO: 10.6 FL (ref 6–12)
POTASSIUM BLD-SCNC: 3.8 MMOL/L (ref 3.5–5.2)
PROT SERPL-MCNC: 7.4 G/DL (ref 6–8.5)
RBC # BLD AUTO: 4.76 10*6/MM3 (ref 3.77–5.28)
SODIUM BLD-SCNC: 137 MMOL/L (ref 136–145)
WBC NRBC COR # BLD: 12.74 10*3/MM3 (ref 3.4–10.8)
WHOLE BLOOD HOLD SPECIMEN: NORMAL

## 2020-06-19 PROCEDURE — 86038 ANTINUCLEAR ANTIBODIES: CPT | Performed by: INTERNAL MEDICINE

## 2020-06-19 PROCEDURE — 88185 FLOWCYTOMETRY/TC ADD-ON: CPT | Performed by: INTERNAL MEDICINE

## 2020-06-19 PROCEDURE — 84165 PROTEIN E-PHORESIS SERUM: CPT | Performed by: INTERNAL MEDICINE

## 2020-06-19 PROCEDURE — 85652 RBC SED RATE AUTOMATED: CPT | Performed by: INTERNAL MEDICINE

## 2020-06-19 PROCEDURE — 88184 FLOWCYTOMETRY/ TC 1 MARKER: CPT | Performed by: INTERNAL MEDICINE

## 2020-06-19 PROCEDURE — 83883 ASSAY NEPHELOMETRY NOT SPEC: CPT | Performed by: INTERNAL MEDICINE

## 2020-06-19 PROCEDURE — 99204 OFFICE O/P NEW MOD 45 MIN: CPT | Performed by: INTERNAL MEDICINE

## 2020-06-19 PROCEDURE — 86140 C-REACTIVE PROTEIN: CPT | Performed by: INTERNAL MEDICINE

## 2020-06-19 PROCEDURE — 80053 COMPREHEN METABOLIC PANEL: CPT | Performed by: INTERNAL MEDICINE

## 2020-06-19 PROCEDURE — 36415 COLL VENOUS BLD VENIPUNCTURE: CPT

## 2020-06-19 PROCEDURE — 85025 COMPLETE CBC W/AUTO DIFF WBC: CPT | Performed by: INTERNAL MEDICINE

## 2020-06-19 PROCEDURE — 82784 ASSAY IGA/IGD/IGG/IGM EACH: CPT | Performed by: INTERNAL MEDICINE

## 2020-06-19 PROCEDURE — 86334 IMMUNOFIX E-PHORESIS SERUM: CPT | Performed by: INTERNAL MEDICINE

## 2020-06-19 PROCEDURE — 88182 CELL MARKER STUDY: CPT

## 2020-06-19 PROCEDURE — 82533 TOTAL CORTISOL: CPT | Performed by: INTERNAL MEDICINE

## 2020-06-19 NOTE — PROGRESS NOTES
"  Subjective     REASON FOR CONSULTATION: Leukocytosis  Provide an opinion on any further workup or treatment                             REQUESTING PHYSICIAN: Erica Knight MD    RECORDS OBTAINED:  Records of the patients history including those obtained from the referring provider were reviewed and summarized in detail.    HISTORY OF PRESENT ILLNESS:  The patient is a 70 y.o. year old female who is here for an opinion about the above issue.  She is referred to us from her primary care office for evaluation of chronic mild leukocytosis with a preponderance of neutrophils.  Her white count has been mildly elevated for at least 5 years upon reviewing labs available in the computer.  Her hemoglobin and platelet count have always been within normal limits.  Her white cell differential shows predominantly mature neutrophils.    Reports that she is feeling well.  She is not had any symptoms of fevers, chills, night sweats, unexplained weight loss, lymphadenopathy or unusual pains.  No unusual rashes.    She has not been exposed to any steroid therapies recently and she is not a smoker.  She still has her spleen.  She is up-to-date on her health screening studies including mammograms and colonoscopies and pelvic exams and Pap smears.    History of Present Illness     Past Medical History:   Diagnosis Date   • Acute non-recurrent maxillary sinusitis 10/25/2016   • Anatomical narrow angle borderline glaucoma    • Cholelithiasis     s/p cholecystectomy 2007   • Diverticulosis    • Headache    • Hematuria     2004, \"-\" w/u by  - essential microscopic hematuria   • Hemorrhoids    • Hypertension    • Menopause    • MRSA (methicillin resistant Staphylococcus aureus) infection     Facial cellulitis        Past Surgical History:   Procedure Laterality Date   • ABDOMINAL SURGERY     • CHOLECYSTECTOMY     • COLONOSCOPY N/A 4/18/2016    Dr. Sonia MD; nl   • TONSILLECTOMY          Current Outpatient Medications on File " Prior to Visit   Medication Sig Dispense Refill   • amLODIPine (NORVASC) 5 MG tablet Take 1 tablet by mouth once daily 90 tablet 3   • fluticasone (FLONASE) 50 MCG/ACT nasal spray 2 sprays into the nostril(s) as directed by provider Daily. 3 bottle 3   • meloxicam (MOBIC) 15 MG tablet Take 1 tablet by mouth once daily 90 tablet 3   • montelukast (SINGULAIR) 10 MG tablet TAKE 1 TABLET BY MOUTH ONCE DAILY AT NIGHT 30 tablet 3   • potassium chloride (KLOR-CON) 8 MEQ CR tablet Take 1 tablet by mouth Daily. 90 tablet 3   • triamterene-hydrochlorothiazide (MAXZIDE) 75-50 MG per tablet Take 0.5 tablets by mouth Daily. 45 tablet 3   • venlafaxine XR (EFFEXOR-XR) 150 MG 24 hr capsule Take 1 capsule by mouth Daily. 90 capsule 3     No current facility-administered medications on file prior to visit.         ALLERGIES:  No Known Allergies     Social History     Socioeconomic History   • Marital status:      Spouse name: Not on file   • Number of children: Not on file   • Years of education: Not on file   • Highest education level: Not on file   Tobacco Use   • Smoking status: Never Smoker   • Smokeless tobacco: Never Used   Substance and Sexual Activity   • Alcohol use: Yes     Comment: social   • Sexual activity: Defer        Family History   Problem Relation Age of Onset   • Coronary artery disease Mother    • Diabetes Mother    • Hypertension Mother    • Breast cancer Maternal Aunt         Review of Systems   Constitutional: Negative for activity change, chills, fatigue and fever.   HENT: Negative for mouth sores, trouble swallowing and voice change.    Eyes: Negative for pain and visual disturbance.   Respiratory: Negative for cough, shortness of breath and wheezing.    Cardiovascular: Negative for chest pain and palpitations.   Gastrointestinal: Negative for abdominal pain, constipation, diarrhea, nausea and vomiting.   Genitourinary: Negative for difficulty urinating, frequency and urgency.   Musculoskeletal:  "Positive for back pain. Negative for arthralgias and joint swelling.   Skin: Negative for rash.   Neurological: Negative for dizziness, seizures, weakness and headaches.   Hematological: Negative for adenopathy. Does not bruise/bleed easily.   Psychiatric/Behavioral: Negative for behavioral problems and confusion. The patient is not nervous/anxious.         Objective     Vitals:    06/19/20 0926   BP: 144/81   Pulse: 93   Resp: 16   Temp: 97.5 °F (36.4 °C)   TempSrc: Temporal   SpO2: 97%   Weight: 90.4 kg (199 lb 6.4 oz)   Height: 160 cm (62.99\")   PainSc: 0-No pain     Current Status 6/19/2020   ECOG score 1       Physical Exam   Constitutional: She is oriented to person, place, and time. She appears well-developed and well-nourished. No distress.   HENT:   Head: Normocephalic.   Eyes: Pupils are equal, round, and reactive to light. Conjunctivae and EOM are normal. No scleral icterus.   Neck: Normal range of motion. Neck supple. No JVD present. No thyromegaly present.   Cardiovascular: Normal rate and regular rhythm. Exam reveals no gallop and no friction rub.   No murmur heard.  Pulmonary/Chest: Effort normal and breath sounds normal. She has no wheezes. She has no rales.   Abdominal: Soft. She exhibits no distension and no mass. There is no tenderness.   Musculoskeletal: Normal range of motion. She exhibits no edema or deformity.   Dupuytren's contracture of the left hand   Lymphadenopathy:     She has no cervical adenopathy.   Neurological: She is alert and oriented to person, place, and time. She has normal reflexes. No cranial nerve deficit.   Skin: Skin is warm and dry. No rash noted. No erythema.   Psychiatric: She has a normal mood and affect. Her behavior is normal. Judgment normal.         RECENT LABS:  Hematology WBC   Date Value Ref Range Status   06/19/2020 12.74 (H) 3.40 - 10.80 10*3/mm3 Final   06/02/2020 14.64 (H) 3.40 - 10.80 10*3/mm3 Final     RBC   Date Value Ref Range Status   06/19/2020 4.76 " 3.77 - 5.28 10*6/mm3 Final   06/02/2020 4.74 3.77 - 5.28 10*6/mm3 Final     Hemoglobin   Date Value Ref Range Status   06/19/2020 14.8 12.0 - 15.9 g/dL Final     Hematocrit   Date Value Ref Range Status   06/19/2020 44.4 34.0 - 46.6 % Final     Platelets   Date Value Ref Range Status   06/19/2020 323 140 - 450 10*3/mm3 Final          Assessment/Plan     1.  Mild chronic leukocytosis.  The patient appears relatively healthy and we certainly feel that this is a benign reactive leukocytosis.  It has been somewhat chronic and she does not have any acute inflammatory or infectious issues but it could be related to many causes including stress, diverticulosis/diverticulitis, or even mild chronic arthritis, allergies, or sinus issues.  We certainly do not see any worrisome findings on her smear but we will check some additional labs to rule out other potential causes of her leukocytosis.    Recommendations  1.  Additional labs will be ordered from the office today including an UCHE, erythrocyte sedimentation rate, C-reactive protein, serum protein electrophoresis with immunofixation, serum cortisol level, and peripheral blood flow cytometry to look for any abnormal populations white cells.  2.  Again, I reassured the patient that I do not think she has any type of serious hematologic disorder.  We will review the lab results as the come back and contact the patient if there are any significant abnormalities.  Otherwise we have not scheduled routine follow-up in our office and would just recommend that she continue to have blood counts checked every 6 to 12 months at her primary care office.  We would certainly be happy to see her again anytime in the future if her counts change significantly over time.    Thanks for allowing us to see this nice patient in consultation.

## 2020-06-22 LAB
ALBUMIN SERPL-MCNC: 3.7 G/DL (ref 2.9–4.4)
ALBUMIN/GLOB SERPL: 1.2 {RATIO} (ref 0.7–1.7)
ALPHA1 GLOB FLD ELPH-MCNC: 0.3 G/DL (ref 0–0.4)
ALPHA2 GLOB SERPL ELPH-MCNC: 0.8 G/DL (ref 0.4–1)
ANA SER QL: NEGATIVE
B-GLOBULIN SERPL ELPH-MCNC: 1.3 G/DL (ref 0.7–1.3)
GAMMA GLOB SERPL ELPH-MCNC: 0.9 G/DL (ref 0.4–1.8)
GLOBULIN SER CALC-MCNC: 3.3 G/DL (ref 2.2–3.9)
IGA SERPL-MCNC: 338 MG/DL (ref 64–422)
IGG SERPL-MCNC: 1005 MG/DL (ref 586–1602)
IGM SERPL-MCNC: 51 MG/DL (ref 26–217)
INTERPRETATION SERPL IEP-IMP: ABNORMAL
KAPPA LC SERPL-MCNC: 20.9 MG/L (ref 3.3–19.4)
KAPPA LC/LAMBDA SER: 1.29 {RATIO} (ref 0.26–1.65)
LAMBDA LC FREE SERPL-MCNC: 16.2 MG/L (ref 5.7–26.3)
Lab: ABNORMAL
M-SPIKE: ABNORMAL G/DL
PROT SERPL-MCNC: 7 G/DL (ref 6–8.5)
REF LAB TEST METHOD: NORMAL

## 2020-08-06 DIAGNOSIS — F41.8 ANXIETY ASSOCIATED WITH DEPRESSION: ICD-10-CM

## 2020-08-06 RX ORDER — VENLAFAXINE HYDROCHLORIDE 150 MG/1
150 CAPSULE, EXTENDED RELEASE ORAL DAILY
Qty: 90 CAPSULE | Refills: 1
Start: 2020-08-06 | End: 2020-08-11 | Stop reason: SDUPTHER

## 2020-08-06 NOTE — TELEPHONE ENCOUNTER
Caller: Hammad Karissa    Relationship: Self    Best call back number:[phone]     Medication needed:   Requested Prescriptions     Pending Prescriptions Disp Refills   • venlafaxine XR (EFFEXOR-XR) 150 MG 24 hr capsule 90 capsule 3     Sig: Take 1 capsule by mouth Daily.       When do you need the refill by: ASAP    [phone] [facility] [phone]

## 2020-08-11 DIAGNOSIS — F41.8 ANXIETY ASSOCIATED WITH DEPRESSION: ICD-10-CM

## 2020-08-11 RX ORDER — VENLAFAXINE HYDROCHLORIDE 150 MG/1
150 CAPSULE, EXTENDED RELEASE ORAL DAILY
Qty: 90 CAPSULE | Refills: 1 | Status: SHIPPED | OUTPATIENT
Start: 2020-08-11 | End: 2020-12-04 | Stop reason: SDUPTHER

## 2020-08-11 NOTE — TELEPHONE ENCOUNTER
PATIENT CALLED AGAIN IN REFERENCE TO MED REFILL FOR   venlafaxine XR (EFFEXOR-XR) 150 MG 24 hr capsule   SHE HAS 3 TABLETS LEFT      [place] [address] [address] [facility] - [phone] [facility] [phone]   [phone]    PATIENT CALL BACK NUMBER [phone]    PLEASE CALL AND ADVISE

## 2020-08-20 ENCOUNTER — TELEPHONE (OUTPATIENT)
Dept: INTERNAL MEDICINE | Facility: CLINIC | Age: 70
End: 2020-08-20

## 2020-08-20 DIAGNOSIS — J01.01 ACUTE RECURRENT MAXILLARY SINUSITIS: ICD-10-CM

## 2020-08-20 RX ORDER — MONTELUKAST SODIUM 10 MG/1
10 TABLET ORAL NIGHTLY
Qty: 90 TABLET | Refills: 1 | Status: SHIPPED | OUTPATIENT
Start: 2020-08-20 | End: 2020-12-04 | Stop reason: SDUPTHER

## 2020-08-20 NOTE — TELEPHONE ENCOUNTER
PATIENT CALLED TO REQUEST A REFILL OF THE MONTELUKAST (SINGULAIR) 10 MG TABLET.    THE PATIENT STATED THAT SHE TAKES 1 TABLET A DAY, AND SHE CURRENTLY HAS ABOUT 15 TABLETS LEFT OF THIS MEDICATION.    THE PATIENT REQUESTED FOR THE PRESCRIPTION TO BE WRITTEN FOR A 90-DAY SUPPLY AND WITH 1 REFILL ON IT, SO THAT SHE WILL HAVE ENOUGH MEDICATION TO GET HER THROUGH UNTIL HER APPOINTMENT WITH DR. SELF ON 12/4/20.     I CONFIRMED THE CORRECT PHARMACY WITH THE PATIENT TO BE [facility] IN [facility] ON [facility].    IF THERE ARE ANY QUESTIONS OR CONCERNS PLEASE CALL THE PATIENT -[phone] OR THE PHARMACY AT [facility] -[phone].

## 2020-10-01 ENCOUNTER — HOSPITAL ENCOUNTER (INPATIENT)
Facility: HOSPITAL | Age: 70
LOS: 3 days | Discharge: HOME | End: 2020-10-04
Attending: EMERGENCY MEDICINE | Admitting: INTERNAL MEDICINE

## 2020-10-01 ENCOUNTER — APPOINTMENT (OUTPATIENT)
Dept: CT IMAGING | Facility: HOSPITAL | Age: 70
End: 2020-10-01

## 2020-10-01 DIAGNOSIS — N12 PYELONEPHRITIS OF LEFT KIDNEY: Primary | ICD-10-CM

## 2020-10-01 DIAGNOSIS — R11.2 NON-INTRACTABLE VOMITING WITH NAUSEA, UNSPECIFIED VOMITING TYPE: ICD-10-CM

## 2020-10-01 LAB
ALBUMIN SERPL-MCNC: 3.5 G/DL (ref 3.5–5.2)
ALBUMIN/GLOB SERPL: 1.3 G/DL
ALP SERPL-CCNC: 98 U/L (ref 39–117)
ALT SERPL W P-5'-P-CCNC: 25 U/L (ref 1–33)
ANION GAP SERPL CALCULATED.3IONS-SCNC: 13.2 MMOL/L (ref 5–15)
AST SERPL-CCNC: 45 U/L (ref 1–32)
BACTERIA UR QL AUTO: ABNORMAL /HPF
BASOPHILS # BLD AUTO: 0.06 10*3/MM3 (ref 0–0.2)
BASOPHILS NFR BLD AUTO: 0.3 % (ref 0–1.5)
BILIRUB SERPL-MCNC: 0.6 MG/DL (ref 0–1.2)
BILIRUB UR QL STRIP: NEGATIVE
BUN SERPL-MCNC: 16 MG/DL (ref 8–23)
BUN/CREAT SERPL: 20.8 (ref 7–25)
CALCIUM SPEC-SCNC: 9.2 MG/DL (ref 8.6–10.5)
CHLORIDE SERPL-SCNC: 98 MMOL/L (ref 98–107)
CLARITY UR: ABNORMAL
CO2 SERPL-SCNC: 26.8 MMOL/L (ref 22–29)
COLOR UR: YELLOW
CREAT SERPL-MCNC: 0.77 MG/DL (ref 0.57–1)
D-LACTATE SERPL-SCNC: 1.5 MMOL/L (ref 0.5–2)
DEPRECATED RDW RBC AUTO: 42.5 FL (ref 37–54)
EOSINOPHIL # BLD AUTO: 0.01 10*3/MM3 (ref 0–0.4)
EOSINOPHIL NFR BLD AUTO: 0 % (ref 0.3–6.2)
ERYTHROCYTE [DISTWIDTH] IN BLOOD BY AUTOMATED COUNT: 12.7 % (ref 12.3–15.4)
GFR SERPL CREATININE-BSD FRML MDRD: 74 ML/MIN/1.73
GLOBULIN UR ELPH-MCNC: 2.8 GM/DL
GLUCOSE SERPL-MCNC: 143 MG/DL (ref 65–99)
GLUCOSE UR STRIP-MCNC: NEGATIVE MG/DL
HCT VFR BLD AUTO: 40.3 % (ref 34–46.6)
HGB BLD-MCNC: 13.8 G/DL (ref 12–15.9)
HGB UR QL STRIP.AUTO: ABNORMAL
HOLD SPECIMEN: NORMAL
HOLD SPECIMEN: NORMAL
HYALINE CASTS UR QL AUTO: ABNORMAL /LPF
IMM GRANULOCYTES # BLD AUTO: 0.11 10*3/MM3 (ref 0–0.05)
IMM GRANULOCYTES NFR BLD AUTO: 0.5 % (ref 0–0.5)
KETONES UR QL STRIP: NEGATIVE
LEUKOCYTE ESTERASE UR QL STRIP.AUTO: ABNORMAL
LYMPHOCYTES # BLD AUTO: 1.09 10*3/MM3 (ref 0.7–3.1)
LYMPHOCYTES NFR BLD AUTO: 5.2 % (ref 19.6–45.3)
MAGNESIUM SERPL-MCNC: 1.7 MG/DL (ref 1.6–2.4)
MCH RBC QN AUTO: 31.2 PG (ref 26.6–33)
MCHC RBC AUTO-ENTMCNC: 34.2 G/DL (ref 31.5–35.7)
MCV RBC AUTO: 91 FL (ref 79–97)
MONOCYTES # BLD AUTO: 1.93 10*3/MM3 (ref 0.1–0.9)
MONOCYTES NFR BLD AUTO: 9.2 % (ref 5–12)
NEUTROPHILS NFR BLD AUTO: 17.7 10*3/MM3 (ref 1.7–7)
NEUTROPHILS NFR BLD AUTO: 84.8 % (ref 42.7–76)
NITRITE UR QL STRIP: POSITIVE
NRBC BLD AUTO-RTO: 0 /100 WBC (ref 0–0.2)
PH UR STRIP.AUTO: 5.5 [PH] (ref 5–8)
PLATELET # BLD AUTO: 274 10*3/MM3 (ref 140–450)
PMV BLD AUTO: 10.4 FL (ref 6–12)
POTASSIUM SERPL-SCNC: 3 MMOL/L (ref 3.5–5.2)
PROCALCITONIN SERPL-MCNC: 3.3 NG/ML (ref 0–0.25)
PROT SERPL-MCNC: 6.3 G/DL (ref 6–8.5)
PROT UR QL STRIP: ABNORMAL
RBC # BLD AUTO: 4.43 10*6/MM3 (ref 3.77–5.28)
RBC # UR: ABNORMAL /HPF
REF LAB TEST METHOD: ABNORMAL
SODIUM SERPL-SCNC: 138 MMOL/L (ref 136–145)
SP GR UR STRIP: >=1.03 (ref 1–1.03)
SQUAMOUS #/AREA URNS HPF: ABNORMAL /HPF
UROBILINOGEN UR QL STRIP: ABNORMAL
WBC # BLD AUTO: 20.9 10*3/MM3 (ref 3.4–10.8)
WBC UR QL AUTO: ABNORMAL /HPF
WHOLE BLOOD HOLD SPECIMEN: NORMAL
WHOLE BLOOD HOLD SPECIMEN: NORMAL

## 2020-10-01 PROCEDURE — 25010000002 CEFTRIAXONE PER 250 MG: Performed by: PHYSICIAN ASSISTANT

## 2020-10-01 PROCEDURE — 74177 CT ABD & PELVIS W/CONTRAST: CPT

## 2020-10-01 PROCEDURE — 85025 COMPLETE CBC W/AUTO DIFF WBC: CPT | Performed by: PHYSICIAN ASSISTANT

## 2020-10-01 PROCEDURE — 81001 URINALYSIS AUTO W/SCOPE: CPT | Performed by: PHYSICIAN ASSISTANT

## 2020-10-01 PROCEDURE — 83735 ASSAY OF MAGNESIUM: CPT | Performed by: INTERNAL MEDICINE

## 2020-10-01 PROCEDURE — 36415 COLL VENOUS BLD VENIPUNCTURE: CPT

## 2020-10-01 PROCEDURE — 99284 EMERGENCY DEPT VISIT MOD MDM: CPT

## 2020-10-01 PROCEDURE — 80053 COMPREHEN METABOLIC PANEL: CPT | Performed by: PHYSICIAN ASSISTANT

## 2020-10-01 PROCEDURE — 25010000002 IOPAMIDOL 61 % SOLUTION: Performed by: EMERGENCY MEDICINE

## 2020-10-01 PROCEDURE — 84145 PROCALCITONIN (PCT): CPT | Performed by: EMERGENCY MEDICINE

## 2020-10-01 PROCEDURE — U0004 COV-19 TEST NON-CDC HGH THRU: HCPCS | Performed by: PHYSICIAN ASSISTANT

## 2020-10-01 PROCEDURE — 83605 ASSAY OF LACTIC ACID: CPT | Performed by: PHYSICIAN ASSISTANT

## 2020-10-01 PROCEDURE — 87040 BLOOD CULTURE FOR BACTERIA: CPT | Performed by: PHYSICIAN ASSISTANT

## 2020-10-01 PROCEDURE — 87086 URINE CULTURE/COLONY COUNT: CPT | Performed by: PHYSICIAN ASSISTANT

## 2020-10-01 RX ORDER — MELOXICAM 15 MG/1
15 TABLET ORAL DAILY
Status: DISCONTINUED | OUTPATIENT
Start: 2020-10-01 | End: 2020-10-04 | Stop reason: HOSPADM

## 2020-10-01 RX ORDER — POTASSIUM CHLORIDE 750 MG/1
20 CAPSULE, EXTENDED RELEASE ORAL 2 TIMES DAILY WITH MEALS
Status: DISCONTINUED | OUTPATIENT
Start: 2020-10-01 | End: 2020-10-04 | Stop reason: HOSPADM

## 2020-10-01 RX ORDER — ACETAMINOPHEN 160 MG/5ML
650 SOLUTION ORAL EVERY 4 HOURS PRN
Status: DISCONTINUED | OUTPATIENT
Start: 2020-10-01 | End: 2020-10-04 | Stop reason: HOSPADM

## 2020-10-01 RX ORDER — POTASSIUM CHLORIDE 750 MG/1
40 CAPSULE, EXTENDED RELEASE ORAL AS NEEDED
Status: DISCONTINUED | OUTPATIENT
Start: 2020-10-01 | End: 2020-10-04 | Stop reason: HOSPADM

## 2020-10-01 RX ORDER — ACETAMINOPHEN 325 MG/1
650 TABLET ORAL ONCE
Status: COMPLETED | OUTPATIENT
Start: 2020-10-01 | End: 2020-10-01

## 2020-10-01 RX ORDER — CEFTRIAXONE SODIUM 2 G/50ML
2 INJECTION, SOLUTION INTRAVENOUS EVERY 24 HOURS
Status: DISCONTINUED | OUTPATIENT
Start: 2020-10-02 | End: 2020-10-04 | Stop reason: HOSPADM

## 2020-10-01 RX ORDER — FLUTICASONE PROPIONATE 50 MCG
2 SPRAY, SUSPENSION (ML) NASAL DAILY
Status: DISCONTINUED | OUTPATIENT
Start: 2020-10-01 | End: 2020-10-04 | Stop reason: HOSPADM

## 2020-10-01 RX ORDER — AMLODIPINE BESYLATE 5 MG/1
5 TABLET ORAL NIGHTLY
Status: DISCONTINUED | OUTPATIENT
Start: 2020-10-01 | End: 2020-10-04 | Stop reason: HOSPADM

## 2020-10-01 RX ORDER — POTASSIUM CHLORIDE 1.5 G/1.77G
40 POWDER, FOR SOLUTION ORAL AS NEEDED
Status: DISCONTINUED | OUTPATIENT
Start: 2020-10-01 | End: 2020-10-04 | Stop reason: HOSPADM

## 2020-10-01 RX ORDER — VENLAFAXINE HYDROCHLORIDE 150 MG/1
150 CAPSULE, EXTENDED RELEASE ORAL DAILY
Status: DISCONTINUED | OUTPATIENT
Start: 2020-10-01 | End: 2020-10-04 | Stop reason: HOSPADM

## 2020-10-01 RX ORDER — SODIUM CHLORIDE, SODIUM LACTATE, POTASSIUM CHLORIDE, CALCIUM CHLORIDE 600; 310; 30; 20 MG/100ML; MG/100ML; MG/100ML; MG/100ML
100 INJECTION, SOLUTION INTRAVENOUS CONTINUOUS
Status: DISCONTINUED | OUTPATIENT
Start: 2020-10-01 | End: 2020-10-03

## 2020-10-01 RX ORDER — CEFTRIAXONE SODIUM 1 G/50ML
1 INJECTION, SOLUTION INTRAVENOUS ONCE
Status: COMPLETED | OUTPATIENT
Start: 2020-10-01 | End: 2020-10-01

## 2020-10-01 RX ORDER — CEFTRIAXONE SODIUM 2 G/50ML
2 INJECTION, SOLUTION INTRAVENOUS EVERY 24 HOURS
Status: DISCONTINUED | OUTPATIENT
Start: 2020-10-01 | End: 2020-10-01

## 2020-10-01 RX ORDER — SODIUM CHLORIDE 0.9 % (FLUSH) 0.9 %
10 SYRINGE (ML) INJECTION EVERY 12 HOURS SCHEDULED
Status: DISCONTINUED | OUTPATIENT
Start: 2020-10-01 | End: 2020-10-04 | Stop reason: HOSPADM

## 2020-10-01 RX ORDER — MONTELUKAST SODIUM 10 MG/1
10 TABLET ORAL NIGHTLY
Status: DISCONTINUED | OUTPATIENT
Start: 2020-10-01 | End: 2020-10-04 | Stop reason: HOSPADM

## 2020-10-01 RX ORDER — POTASSIUM CHLORIDE 7.45 MG/ML
10 INJECTION INTRAVENOUS
Status: DISCONTINUED | OUTPATIENT
Start: 2020-10-01 | End: 2020-10-04 | Stop reason: HOSPADM

## 2020-10-01 RX ORDER — ONDANSETRON 2 MG/ML
4 INJECTION INTRAMUSCULAR; INTRAVENOUS EVERY 6 HOURS PRN
Status: DISCONTINUED | OUTPATIENT
Start: 2020-10-01 | End: 2020-10-04 | Stop reason: HOSPADM

## 2020-10-01 RX ORDER — ACETAMINOPHEN 650 MG/1
650 SUPPOSITORY RECTAL EVERY 4 HOURS PRN
Status: DISCONTINUED | OUTPATIENT
Start: 2020-10-01 | End: 2020-10-04 | Stop reason: HOSPADM

## 2020-10-01 RX ORDER — ACETAMINOPHEN 325 MG/1
650 TABLET ORAL EVERY 4 HOURS PRN
Status: DISCONTINUED | OUTPATIENT
Start: 2020-10-01 | End: 2020-10-04 | Stop reason: HOSPADM

## 2020-10-01 RX ORDER — SODIUM CHLORIDE 0.9 % (FLUSH) 0.9 %
10 SYRINGE (ML) INJECTION AS NEEDED
Status: DISCONTINUED | OUTPATIENT
Start: 2020-10-01 | End: 2020-10-04 | Stop reason: HOSPADM

## 2020-10-01 RX ADMIN — SODIUM CHLORIDE, POTASSIUM CHLORIDE, SODIUM LACTATE AND CALCIUM CHLORIDE 100 ML/HR: 600; 310; 30; 20 INJECTION, SOLUTION INTRAVENOUS at 18:26

## 2020-10-01 RX ADMIN — CEFTRIAXONE SODIUM 1 G: 1 INJECTION, SOLUTION INTRAVENOUS at 12:56

## 2020-10-01 RX ADMIN — ACETAMINOPHEN 650 MG: 325 TABLET, FILM COATED ORAL at 11:24

## 2020-10-01 RX ADMIN — MELOXICAM 15 MG: 15 TABLET ORAL at 21:43

## 2020-10-01 RX ADMIN — POTASSIUM CHLORIDE 40 MEQ: 10 CAPSULE, COATED, EXTENDED RELEASE ORAL at 15:36

## 2020-10-01 RX ADMIN — VENLAFAXINE HYDROCHLORIDE 150 MG: 150 CAPSULE, EXTENDED RELEASE ORAL at 21:43

## 2020-10-01 RX ADMIN — AMLODIPINE BESYLATE 5 MG: 5 TABLET ORAL at 21:40

## 2020-10-01 RX ADMIN — SODIUM CHLORIDE, PRESERVATIVE FREE 10 ML: 5 INJECTION INTRAVENOUS at 21:57

## 2020-10-01 RX ADMIN — MONTELUKAST SODIUM 10 MG: 10 TABLET, FILM COATED ORAL at 21:40

## 2020-10-01 RX ADMIN — IOPAMIDOL 85 ML: 612 INJECTION, SOLUTION INTRAVENOUS at 12:41

## 2020-10-01 RX ADMIN — SODIUM CHLORIDE 1000 ML: 9 INJECTION, SOLUTION INTRAVENOUS at 13:53

## 2020-10-01 RX ADMIN — POTASSIUM CHLORIDE 20 MEQ: 10 CAPSULE, COATED, EXTENDED RELEASE ORAL at 18:26

## 2020-10-01 RX ADMIN — POTASSIUM CHLORIDE 40 MEQ: 10 CAPSULE, COATED, EXTENDED RELEASE ORAL at 21:56

## 2020-10-01 NOTE — ED TRIAGE NOTES
Pt arrives via EMS from home. States that for the last 4 nights she has had an episode of incontinence of her bowels and afterwards she vomits. Pt complains of weakness. Pt masked at arrival and triage staff wore all appropriate PPE.

## 2020-10-01 NOTE — ED NOTES
Nursing report ED to floor  Mary Cueva  70 y.o.  female    HPI (triage note):   Chief Complaint   Patient presents with   • Urinary Frequency   • Fever   • Weakness - Generalized       Admitting doctor:   Chika Leo MD    Admitting diagnosis:   The primary encounter diagnosis was Pyelonephritis of left kidney. A diagnosis of Non-intractable vomiting with nausea, unspecified vomiting type was also pertinent to this visit.    Code status:   Current Code Status     Date Active Code Status Order ID Comments User Context       Not on file    Advance Care Planning Activity          Allergies:   Patient has no known allergies.    Weight:   There were no vitals filed for this visit.    Most recent vitals:   Vitals:    10/01/20 1255 10/01/20 1331 10/01/20 1341 10/01/20 1348   BP: 129/64  136/83    Patient Position:       Pulse: 84 79     Resp: 18      Temp: 98.8 °F (37.1 °C)      TempSrc:       SpO2: 96% 96%  95%       Active LDAs/IV Access:   Lines, Drains & Airways    Active LDAs     Name:   Placement date:   Placement time:   Site:   Days:    Peripheral IV 10/01/20 1354 Left Hand   10/01/20    1354    Hand   less than 1                Labs (abnormal labs have a star):   Labs Reviewed   COMPREHENSIVE METABOLIC PANEL - Abnormal; Notable for the following components:       Result Value    Glucose 143 (*)     Potassium 3.0 (*)     AST (SGOT) 45 (*)     All other components within normal limits    Narrative:     GFR Normal >60  Chronic Kidney Disease <60  Kidney Failure <15     CBC WITH AUTO DIFFERENTIAL - Abnormal; Notable for the following components:    WBC 20.90 (*)     Neutrophil % 84.8 (*)     Lymphocyte % 5.2 (*)     Eosinophil % 0.0 (*)     Neutrophils, Absolute 17.70 (*)     Monocytes, Absolute 1.93 (*)     Immature Grans, Absolute 0.11 (*)     All other components within normal limits   PROCALCITONIN - Abnormal; Notable for the following components:    Procalcitonin 3.30 (*)     All other components  "within normal limits    Narrative:     As a Marker for Sepsis (Non-Neonates):   1. <0.5 ng/mL represents a low risk of severe sepsis and/or septic shock.  1. >2 ng/mL represents a high risk of severe sepsis and/or septic shock.    As a Marker for Lower Respiratory Tract Infections that require antibiotic therapy:  PCT on Admission     Antibiotic Therapy             6-12 Hrs later  > 0.5                Strongly Recommended            >0.25 - <0.5         Recommended  0.1 - 0.25           Discouraged                   Remeasure/reassess PCT  <0.1                 Strongly Discouraged          Remeasure/reassess PCT      As 28 day mortality risk marker: \"Change in Procalcitonin Result\" (> 80 % or <=80 %) if Day 0 (or Day 1) and Day 4 values are available. Refer to [email or web address]   Change in PCT <=80 %   A decrease of PCT levels below or equal to 80 % defines a positive change in PCT test result representing a higher risk for 28-day all-cause mortality of patients diagnosed with severe sepsis or septic shock.  Change in PCT > 80 %   A decrease of PCT levels of more than 80 % defines a negative change in PCT result representing a lower risk for 28-day all-cause mortality of patients diagnosed with severe sepsis or septic shock.                Results may be falsely decreased if patient taking Biotin.    LACTIC ACID, PLASMA - Normal   BLOOD CULTURE   BLOOD CULTURE   COVID PRE-OP / PRE-PROCEDURE SCREENING ORDER (NO ISOLATION)    Narrative:     The following orders were created for panel order COVID PRE-OP / PRE-PROCEDURE SCREENING ORDER (NO ISOLATION) - Swab, Nasopharynx.  Procedure                               Abnormality         Status                     ---------                               -----------         ------                     COVID-19,BIOTAP, NP/OP S...[[id number]]                                                   Please view results for these tests on the individual orders. "   COVID-19,BIOTAP, NP/OP SWAB IN TRANSPORT MEDIA OR SALINE 24-36 HR TAT   RAINBOW DRAW    Narrative:     The following orders were created for panel order Holder Draw.  Procedure                               Abnormality         Status                     ---------                               -----------         ------                     Light Blue Top[[id number]]                                   Final result               Green Top (Gel)[[id number]]                                  Final result               Lavender Top[[id number]]                                     Final result               Gold Top - SST[[id number]]                                   Final result                 Please view results for these tests on the individual orders.   URINALYSIS W/ CULTURE IF INDICATED   LIGHT BLUE TOP   GREEN TOP   LAVENDER TOP   GOLD TOP - SST   CBC AND DIFFERENTIAL    Narrative:     The following orders were created for panel order CBC & Differential.  Procedure                               Abnormality         Status                     ---------                               -----------         ------                     CBC Auto Differential[[id number]]        Abnormal            Final result                 Please view results for these tests on the individual orders.       EKG:   No orders to display       Meds given in ED:   Medications   sodium chloride 0.9 % flush 10 mL (has no administration in time range)   sodium chloride 0.9 % bolus 1,000 mL (1,000 mL Intravenous New Bag 10/1/20 1353)   acetaminophen (TYLENOL) tablet 650 mg (650 mg Oral Given 10/1/20 1124)   cefTRIAXone (ROCEPHIN) IVPB 1 g (0 g Intravenous Stopped 10/1/20 1335)   iopamidol (ISOVUE-300) 61 % injection 100 mL (85 mL Intravenous Given by Other 10/1/20 1241)   iopamidol (ISOVUE-300) 61 % injection  - ADS Override Pull (has no administration in time range)       Imaging results:  Ct Abdomen Pelvis With Contrast    Result Date: 10/1/2020  There  is acute cystitis and left pyelonephritis.  Discussed with PEDRO Mercer.        Ambulatory status:   - assist x 1    Social issues:   Social History     Socioeconomic History   • Marital status:      Spouse name: Not on file   • Number of children: Not on file   • Years of education: Not on file   • Highest education level: Not on file   Tobacco Use   • Smoking status: Never Smoker   • Smokeless tobacco: Never Used   Substance and Sexual Activity   • Alcohol use: Yes     Comment: social   • Sexual activity: Defer        Nabil Cerda RN  10/01/20 4519

## 2020-10-01 NOTE — H&P
"HISTORY AND PHYSICAL   [facility]        Patient Identification:  Name: Mary Cueva  Age: 70 y.o.  Sex: female  :  1950  MRN: [id number]                     Primary Care Physician: Roxy Knight MD    Chief Complaint:  70 year old female who presented to the emergency room with weakness which started a few days ago; she noted that she hardly had energy to roll over in bed; symptoms started 3 04 days ago but were much worse today so her  advised her to come to the emergency room she has had some back pain and chills; she has had vomiting but only intermittently; denies sick contacts    History of Present Illness:   As above    Past Medical History:  Past Medical History:   Diagnosis Date   • Acute non-recurrent maxillary sinusitis 10/25/2016   • Anatomical narrow angle borderline glaucoma    • Cholelithiasis     s/p cholecystectomy    • Diverticulosis    • Headache    • Hematuria     , \"-\" w/u by  - essential microscopic hematuria   • Hemorrhoids    • Hypertension    • Menopause    • MRSA (methicillin resistant Staphylococcus aureus) infection     Facial cellulitis     Past Surgical History:  Past Surgical History:   Procedure Laterality Date   • ABDOMINAL SURGERY     • CHOLECYSTECTOMY     • COLONOSCOPY N/A 2016    Dr. Sonia MD; nl   • TONSILLECTOMY        Home Meds:  Medications Prior to Admission   Medication Sig Dispense Refill Last Dose   • amLODIPine (NORVASC) 5 MG tablet Take 1 tablet by mouth once daily 90 tablet 3 2020 at Unknown time   • fluticasone (FLONASE) 50 MCG/ACT nasal spray 2 sprays into the nostril(s) as directed by provider Daily. 3 bottle 3 Past Week at Unknown time   • meloxicam (MOBIC) 15 MG tablet Take 1 tablet by mouth once daily 90 tablet 3 2020 at Unknown time   • montelukast (SINGULAIR) 10 MG tablet Take 1 tablet by mouth Every Night. 90 tablet 1 2020 at Unknown time   • potassium chloride (KLOR-CON) 8 MEQ CR tablet " Take 1 tablet by mouth Daily. 90 tablet 3 9/30/2020 at Unknown time   • triamterene-hydrochlorothiazide (MAXZIDE) 75-50 MG per tablet Take 0.5 tablets by mouth Daily. 45 tablet 3 9/30/2020 at Unknown time   • venlafaxine XR (EFFEXOR-XR) 150 MG 24 hr capsule Take 1 capsule by mouth Daily. 90 capsule 1 9/30/2020 at Unknown time       Allergies:  No Known Allergies  Immunizations:  Immunization History   Administered Date(s) Administered   • Influenza TIV (IM) 10/08/2014   • Pneumococcal Conjugate 13-Valent (PCV13) 01/18/2017, 01/18/2018   • Pneumococcal Polysaccharide (PPSV23) 10/08/2014   • Tdap 11/06/2013   • Zostavax 08/01/2014     Social History:   Social History     Social History Narrative   • Not on file     Social History     Socioeconomic History   • Marital status:      Spouse name: Not on file   • Number of children: Not on file   • Years of education: Not on file   • Highest education level: Not on file   Tobacco Use   • Smoking status: Never Smoker   • Smokeless tobacco: Never Used   Substance and Sexual Activity   • Alcohol use: Yes     Alcohol/week: 1.0 standard drinks     Types: 1 Glasses of wine per week     Comment: 1 drink 3 times a week    • Drug use: Never   • Sexual activity: Defer       Family History:  Family History   Problem Relation Age of Onset   • Coronary artery disease Mother    • Diabetes Mother    • Hypertension Mother    • Breast cancer Maternal Aunt         Review of Systems  See history of present illness and past medical history.  Patient denies headache, dizziness, syncope, falls, trauma, change in vision, change in hearing, change in taste, changes in weight, changes in appetite, focal weakness, numbness, or paresthesia.  Patient denies chest pain, palpitations, dyspnea, orthopnea, PND, cough, sinus pressure, rhinorrhea, epistaxis, hemoptysis, nausea, vomiting,hematemesis, diarrhea, constipation or hematchezia.  Denies cold or heat intolerance, polydipsia, polyuria,  polyphagia. Denies hematuria, pyuria, dysuria, hesitancy, frequency or urgency. Denies consumption of raw and under cooked meats foods or change in water source.  Denies fever, chills, sweats, night sweats.  Denies missing any routine medications. Remainder of ROS is negative.    Objective:  T Max 24 hrs: Temp (24hrs), Av.5 °F (37.5 °C), Min:98 °F (36.7 °C), Max:101.1 °F (38.4 °C)    Vitals Ranges:   Temp:  [98 °F (36.7 °C)-101.1 °F (38.4 °C)] 98 °F (36.7 °C)  Heart Rate:  [] 79  Resp:  [18-20] 18  BP: (129-136)/(64-83) 136/83      Exam:  /83   Pulse 79   Temp 98 °F (36.7 °C) (Oral)   Resp 18   SpO2 95%   Breastfeeding No     General Appearance:    Alert, cooperative, no distress, appears stated age   Head:    Normocephalic, without obvious abnormality, atraumatic   Eyes:    PERRL, conjunctivae/corneas clear, EOM's intact, both eyes   Ears:    Normal external ear canals, both ears   Nose:   Nares normal, septum midline, mucosa normal, no drainage    or sinus tenderness   Throat:   Lips, mucosa, and tongue normal   Neck:   Supple, symmetrical, trachea midline, no adenopathy;     thyroid:  no enlargement/tenderness/nodules; no carotid    bruit or JVD   Back:     Symmetric, no curvature, ROM normal, no CVA tenderness   Lungs:     Clear to auscultation bilaterally, respirations unlabored   Chest Wall:    No tenderness or deformity    Heart:    Regular rate and rhythm, S1 and S2 normal, no murmur, rub   or gallop   Abdomen:     Soft, nontender, bowel sounds active all four quadrants,     no masses, no hepatomegaly, no splenomegaly   Extremities:   Extremities normal, atraumatic, no cyanosis or edema   Pulses:   2+ and symmetric all extremities   Skin:   Skin color, texture, turgor normal, no rashes or lesions   Lymph nodes:   Cervical, supraclavicular, and axillary nodes normal   Neurologic:   CNII-XII intact, normal strength, sensation intact throughout      .    Data Review:  Labs in chart were  reviewed.  WBC   Date Value Ref Range Status   10/01/2020 20.90 (H) 3.40 - 10.80 10*3/mm3 Final     Hemoglobin   Date Value Ref Range Status   10/01/2020 13.8 12.0 - 15.9 g/dL Final     Hematocrit   Date Value Ref Range Status   10/01/2020 40.3 34.0 - 46.6 % Final     Platelets   Date Value Ref Range Status   10/01/2020 274 140 - 450 10*3/mm3 Final     Sodium   Date Value Ref Range Status   10/01/2020 138 136 - 145 mmol/L Final     Potassium   Date Value Ref Range Status   10/01/2020 3.0 (L) 3.5 - 5.2 mmol/L Final     Chloride   Date Value Ref Range Status   10/01/2020 98 98 - 107 mmol/L Final     CO2   Date Value Ref Range Status   10/01/2020 26.8 22.0 - 29.0 mmol/L Final     BUN   Date Value Ref Range Status   10/01/2020 16 8 - 23 mg/dL Final     Creatinine   Date Value Ref Range Status   10/01/2020 0.77 0.57 - 1.00 mg/dL Final     Glucose   Date Value Ref Range Status   10/01/2020 143 (H) 65 - 99 mg/dL Final     Calcium   Date Value Ref Range Status   10/01/2020 9.2 8.6 - 10.5 mg/dL Final     Magnesium   Date Value Ref Range Status   10/01/2020 1.7 1.6 - 2.4 mg/dL Final     AST (SGOT)   Date Value Ref Range Status   10/01/2020 45 (H) 1 - 32 U/L Final     ALT (SGPT)   Date Value Ref Range Status   10/01/2020 25 1 - 33 U/L Final     Alkaline Phosphatase   Date Value Ref Range Status   10/01/2020 98 39 - 117 U/L Final     No results found for: APTT, INR  Color, UA   Date Value Ref Range Status   10/01/2020 Yellow Yellow, Straw Final     Appearance, UA   Date Value Ref Range Status   10/01/2020 Cloudy (A) Clear Final     pH, UA   Date Value Ref Range Status   10/01/2020 5.5 5.0 - 8.0 Final     Glucose, UA   Date Value Ref Range Status   10/01/2020 Negative Negative Final     Ketones, UA   Date Value Ref Range Status   10/01/2020 Negative Negative Final     Blood, UA   Date Value Ref Range Status   10/01/2020 Moderate (2+) (A) Negative Final     Leuk Esterase, UA   Date Value Ref Range Status   10/01/2020 Moderate  (2+) (A) Negative Final     Bilirubin, UA   Date Value Ref Range Status   10/01/2020 Negative Negative Final     Urobilinogen, UA   Date Value Ref Range Status   10/01/2020 0.2 E.U./dL 0.2 - 1.0 E.U./dL Final     RBC, UA   Date Value Ref Range Status   10/01/2020 13-20 (A) None Seen, 0-2 /HPF Final     WBC, UA   Date Value Ref Range Status   10/01/2020 21-30 (A) None Seen, 0-2 /HPF Final     Bacteria, UA   Date Value Ref Range Status   10/01/2020 2+ (A) None Seen /HPF Final                Imaging Results (All)     Procedure Component Value Units Date/Time    CT Abdomen Pelvis With Contrast [[id number]] Collected: 10/01/20 1319     Updated: 10/01/20 1319    Narrative:      CT ABDOMEN AND PELVIS WITH IV CONTRAST     HISTORY: 70-year-old female with dysuria, low back pain, and  leukocytosis.     TECHNIQUE: Radiation dose reduction techniques were utilized, including  automated exposure control and exposure modulation based on body size.   3 mm images were obtained through the abdomen and pelvis after the  administration of IV contrast. Compared with previous CT from  11/15/2004.     FINDINGS: There is very mild dilatation and thickening of the entire  left ureter and there is a slightly striated enhancement pattern of the  left kidney. Urinary bladder is thick-walled, but is partially  collapsed. There are no renal or ureteral stones bilaterally. There is a  1.1 cm left renal cyst, previously 4-5 mm. The liver appears  unremarkable other than several small left hepatic lobe cysts which  appear largely unchanged. The gallbladder is surgically absent. There is  no biliary dilatation. The spleen, pancreas, and adrenals appear  unremarkable. No acute bowel abnormality is seen. There is moderate  sigmoid diverticulosis without evidence for diverticulitis. Uterus and  adnexa appear unremarkable. Two nabothian cysts are noted within the  cervix.       Impression:      There is acute cystitis and left pyelonephritis.      Discussed with PEDRO Mercer.           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

## 2020-10-01 NOTE — ED PROVIDER NOTES
EMERGENCY DEPARTMENT ENCOUNTER    Room Number:  06/06  Date seen:  10/1/2020  Time seen: 11:06 EDT  PCP: Roxy Knight MD  Historian: jane blackman      HPI:  Chief Complaint: generalized weakness    Context: Mary Cueva is a 70 y.o. female who presents to the ED for evaluation of generalized weakness and urinary frequency.  Patient states she has experienced fatigue and generalized weakness for several months although states it has gotten much worse over the past 3 to 4 days and has also noticed some urinary frequency and dysuria.  She is also complaining of some low back pain.  She admits to a UTI back in February.  She is not currently on any antibiotics.  She has had no known COVID contacts and has not been tested for COVID herself.  She has also had 2 episodes of vomiting.  She denies any abdominal pain or change in bowel movements.      PAST MEDICAL HISTORY  Active Ambulatory Problems     Diagnosis Date Noted   • Borderline glaucoma with anatomical narrow angle 01/25/2016   • Osteopenia 01/25/2016   • Vitamin D deficiency 01/25/2016   • Adiposity 01/25/2016   • Atopic rhinitis 01/25/2016   • Anxiety disorder 01/25/2016   • Benign essential hypertension 01/25/2016   • Health care maintenance 01/26/2016   • Allergic sinusitis 01/26/2016   • Insomnia secondary to anxiety 01/30/2017   • Neutrophilic leukocytosis 02/27/2018   • Upper back pain, chronic 03/11/2019     Resolved Ambulatory Problems     Diagnosis Date Noted   • Acute non-recurrent maxillary sinusitis 10/25/2016   • Impacted cerumen of left ear 10/25/2016   • Acute cystitis without hematuria 01/25/2017     Past Medical History:   Diagnosis Date   • Anatomical narrow angle borderline glaucoma    • Cholelithiasis    • Diverticulosis    • Headache    • Hematuria    • Hemorrhoids    • Hypertension    • Menopause    • MRSA (methicillin resistant Staphylococcus aureus) infection          PAST SURGICAL HISTORY  Past Surgical History:   Procedure  Laterality Date   • ABDOMINAL SURGERY     • CHOLECYSTECTOMY     • COLONOSCOPY N/A 4/18/2016    Dr. Sonia MD; nl   • TONSILLECTOMY           FAMILY HISTORY  Family History   Problem Relation Age of Onset   • Coronary artery disease Mother    • Diabetes Mother    • Hypertension Mother    • Breast cancer Maternal Aunt          SOCIAL HISTORY  Social History     Socioeconomic History   • Marital status:      Spouse name: Not on file   • Number of children: Not on file   • Years of education: Not on file   • Highest education level: Not on file   Tobacco Use   • Smoking status: Never Smoker   • Smokeless tobacco: Never Used   Substance and Sexual Activity   • Alcohol use: Yes     Comment: social   • Sexual activity: Defer         ALLERGIES  Patient has no known allergies.        REVIEW OF SYSTEMS  Review of Systems   Constitutional: Negative for chills and fever.   Respiratory: Negative for cough, chest tightness and shortness of breath.    Gastrointestinal: Positive for nausea and vomiting. Negative for abdominal pain.   Genitourinary: Positive for dysuria, flank pain and frequency.   Skin: Negative for color change.   Neurological: Negative for light-headedness.   All other systems reviewed and are negative.     All systems reviewed and negative except for those discussed in HPI.       PHYSICAL EXAM  ED Triage Vitals [10/01/20 1031]   Temp Heart Rate Resp BP SpO2   (!) 101.1 °F (38.4 °C) (!) 122 20 130/76 93 %      Temp src Heart Rate Source Patient Position BP Location FiO2 (%)   Tympanic Monitor Lying -- --         GENERAL: not distressed, warm to touch  HENT: atraumatic  EYES: no scleral icterus  CV: regular rhythm, regular rate  RESPIRATORY: normal effort  ABDOMEN: soft, nontender.  CVA tenderness  MUSCULOSKELETAL: no deformity  NEURO: alert, moves all extremities, follows commands  SKIN: warm, dry    Vital signs and nursing notes reviewed.          LAB RESULTS  Recent Results (from the past 24 hour(s))    Light Blue Top    Collection Time: 10/01/20 10:48 AM   Result Value Ref Range    Extra Tube hold for add-on    Green Top (Gel)    Collection Time: 10/01/20 10:48 AM   Result Value Ref Range    Extra Tube Hold for add-ons.    Lavender Top    Collection Time: 10/01/20 10:48 AM   Result Value Ref Range    Extra Tube hold for add-on    Gold Top - SST    Collection Time: 10/01/20 10:48 AM   Result Value Ref Range    Extra Tube Hold for add-ons.    Comprehensive Metabolic Panel    Collection Time: 10/01/20 10:48 AM    Specimen: Blood   Result Value Ref Range    Glucose 143 (H) 65 - 99 mg/dL    BUN 16 8 - 23 mg/dL    Creatinine 0.77 0.57 - 1.00 mg/dL    Sodium 138 136 - 145 mmol/L    Potassium 3.0 (L) 3.5 - 5.2 mmol/L    Chloride 98 98 - 107 mmol/L    CO2 26.8 22.0 - 29.0 mmol/L    Calcium 9.2 8.6 - 10.5 mg/dL    Total Protein 6.3 6.0 - 8.5 g/dL    Albumin 3.50 3.50 - 5.20 g/dL    ALT (SGPT) 25 1 - 33 U/L    AST (SGOT) 45 (H) 1 - 32 U/L    Alkaline Phosphatase 98 39 - 117 U/L    Total Bilirubin 0.6 0.0 - 1.2 mg/dL    eGFR Non African Amer 74 >60 mL/min/1.73    Globulin 2.8 gm/dL    A/G Ratio 1.3 g/dL    BUN/Creatinine Ratio 20.8 7.0 - 25.0    Anion Gap 13.2 5.0 - 15.0 mmol/L   CBC Auto Differential    Collection Time: 10/01/20 10:48 AM    Specimen: Blood   Result Value Ref Range    WBC 20.90 (H) 3.40 - 10.80 10*3/mm3    RBC 4.43 3.77 - 5.28 10*6/mm3    Hemoglobin 13.8 12.0 - 15.9 g/dL    Hematocrit 40.3 34.0 - 46.6 %    MCV 91.0 79.0 - 97.0 fL    MCH 31.2 26.6 - 33.0 pg    MCHC 34.2 31.5 - 35.7 g/dL    RDW 12.7 12.3 - 15.4 %    RDW-SD 42.5 37.0 - 54.0 fl    MPV 10.4 6.0 - 12.0 fL    Platelets 274 140 - 450 10*3/mm3    Neutrophil % 84.8 (H) 42.7 - 76.0 %    Lymphocyte % 5.2 (L) 19.6 - 45.3 %    Monocyte % 9.2 5.0 - 12.0 %    Eosinophil % 0.0 (L) 0.3 - 6.2 %    Basophil % 0.3 0.0 - 1.5 %    Immature Grans % 0.5 0.0 - 0.5 %    Neutrophils, Absolute 17.70 (H) 1.70 - 7.00 10*3/mm3    Lymphocytes, Absolute 1.09 0.70 - 3.10  10*3/mm3    Monocytes, Absolute 1.93 (H) 0.10 - 0.90 10*3/mm3    Eosinophils, Absolute 0.01 0.00 - 0.40 10*3/mm3    Basophils, Absolute 0.06 0.00 - 0.20 10*3/mm3    Immature Grans, Absolute 0.11 (H) 0.00 - 0.05 10*3/mm3    nRBC 0.0 0.0 - 0.2 /100 WBC   Procalcitonin    Collection Time: 10/01/20 10:48 AM    Specimen: Blood   Result Value Ref Range    Procalcitonin 3.30 (H) 0.00 - 0.25 ng/mL   Lactic Acid, Plasma    Collection Time: 10/01/20 11:35 AM    Specimen: Blood   Result Value Ref Range    Lactate 1.5 0.5 - 2.0 mmol/L   Urinalysis With Culture If Indicated - Urine, Clean Catch    Collection Time: 10/01/20  1:42 PM    Specimen: Urine, Clean Catch   Result Value Ref Range    Color, UA Yellow Yellow, Straw    Appearance, UA Cloudy (A) Clear    pH, UA 5.5 5.0 - 8.0    Specific Gravity, UA >=1.030 1.005 - 1.030    Glucose, UA Negative Negative    Ketones, UA Negative Negative    Bilirubin, UA Negative Negative    Blood, UA Moderate (2+) (A) Negative    Protein,  mg/dL (2+) (A) Negative    Leuk Esterase, UA Moderate (2+) (A) Negative    Nitrite, UA Positive (A) Negative    Urobilinogen, UA 0.2 E.U./dL 0.2 - 1.0 E.U./dL       Ordered the above labs and independently reviewed the results.        RADIOLOGY  Ct Abdomen Pelvis With Contrast    Result Date: 10/1/2020  Narrative: CT ABDOMEN AND PELVIS WITH IV CONTRAST  HISTORY: 70-year-old female with dysuria, low back pain, and leukocytosis.  TECHNIQUE: Radiation dose reduction techniques were utilized, including automated exposure control and exposure modulation based on body size. 3 mm images were obtained through the abdomen and pelvis after the administration of IV contrast. Compared with previous CT from 11/15/2004.  FINDINGS: There is very mild dilatation and thickening of the entire left ureter and there is a slightly striated enhancement pattern of the left kidney. Urinary bladder is thick-walled, but is partially collapsed. There are no renal or ureteral  stones bilaterally. There is a 1.1 cm left renal cyst, previously 4-5 mm. The liver appears unremarkable other than several small left hepatic lobe cysts which appear largely unchanged. The gallbladder is surgically absent. There is no biliary dilatation. The spleen, pancreas, and adrenals appear unremarkable. No acute bowel abnormality is seen. There is moderate sigmoid diverticulosis without evidence for diverticulitis. Uterus and adnexa appear unremarkable. Two nabothian cysts are noted within the cervix.      Impression: There is acute cystitis and left pyelonephritis.  Discussed with PEDRO Mercer.        I ordered the above noted radiological studies. Reviewed by me and discussed with radiologist.  See dictation for official radiology interpretation.      MEDICATIONS GIVEN IN ER  Medications   sodium chloride 0.9 % flush 10 mL (has no administration in time range)   sodium chloride 0.9 % bolus 1,000 mL (1,000 mL Intravenous New Bag 10/1/20 1353)   acetaminophen (TYLENOL) tablet 650 mg (650 mg Oral Given 10/1/20 1124)   cefTRIAXone (ROCEPHIN) IVPB 1 g (0 g Intravenous Stopped 10/1/20 1335)   iopamidol (ISOVUE-300) 61 % injection 100 mL (85 mL Intravenous Given by Other 10/1/20 1241)   iopamidol (ISOVUE-300) 61 % injection  - ADS Override Pull (has no administration in time range)       MEDICAL RECORD REVIEW  I reviewed the patient's records      PROGRESS, DATA ANALYSIS, CONSULTS, AND MEDICAL DECISION MAKING    All labs have been independently reviewed by me.  All radiology studies have been reviewed by me. Discussion below represents my analysis of pertinent findings related to patient's condition, differential diagnosis, treatment plan and final disposition.    DDX includes but not limited to cystitis, pyelonephritis, gastroenteritis.      ED Course as of Oct 01 1409   Thu Oct 01, 2020   1230 Upon arrival patient was febrile of 101.1.  Tylenol was ordered.    [AH]   1306 Discussed CT abdomen and pelvis with  Dr. Cogan with radiology who states patient has cystitis and left pyelonephritis with no stones seen.    [AH]   1315 Discussed lab and imaging findings with the patient and informed her of need of admission for IV antibiotics for pyelonephritis.  Patient agrees to plan of care.    []   1337 Discussed patient with Dr. Leo, hospitalist who agrees to admit.    []      ED Course User Index  [AH] Ivett Oneill PA           Reviewed pt's history and workup with Dr. Burger.  After a bedside evaluation; they agree with the plan of care      Patient's disposition is admission.    Patient was placed in face mask in first look. Patient was wearing facemask each time I entered the room and throughout our encounter. I wore full protective equipment throughout this patient encounter including a face mask, eye shield, gown and gloves. Hand hygiene was performed before donning protective equipment and after removal when leaving the room.        DIAGNOSIS  Final diagnoses:   Pyelonephritis of left kidney   Non-intractable vomiting with nausea, unspecified vomiting type           Latest Documented Vital Signs:  As of 14:09 EDT  BP- 136/83 HR- 79 Temp- 98.8 °F (37.1 °C) O2 sat- 95%         Ivett Oneill PA  10/01/20 5006

## 2020-10-01 NOTE — PLAN OF CARE
Goal Outcome Evaluation:  Plan of Care Reviewed With: patient, spouse  Progress: no change  Outcome Summary: Pt arrived to unit around 14:30 c/o weakness, flank pain, & occasional urinary incontience. A&Ox4, up with standby. Safety maintained, will continue to monitor.

## 2020-10-01 NOTE — ED PROVIDER NOTES
I supervised care provided by the midlevel provider.   We have discussed this patient's history, physical exam, and treatment plan.  I have reviewed the note and personally saw and examined the patient and agree with the plan of care.   I have seen and examined this patient.  Obtain history from the patient as well as spouse in the room.  Patient comes here for urinary symptoms for the past 4 days.  She is also had some episodes of vomiting yesterday.  She has chronic back pain but feels like her back pain in the lower back is actually worse.  Denies any focal weakness to arms or legs.  She does report generalized weakness.  She was unaware that she had a fever prior to arrival here.  As mentioned previously she does complain of dysuria and urinary frequency and has had a history of urinary tract infections and this is similar to her past ones.  No chest pain, shortness of breath, or any upper respiratory symptoms.    GENERAL: APPEARS TIRED AND WEAK.  PATIENT IS NOT SEPTIC OR TOXIC APPEARING.  HENT: nares patent  Head/neck/ face are symmetric without gross deformity or swelling  EYES: no scleral icterus  CV: regular rhythm, regular rate with intact distal pulses  RESPIRATORY: normal effort and no respiratory distress  ABDOMEN: soft and non-tender.  Some mild suprapubic pain and has some mild left and right CVA tenderness to palpation.  MUSCULOSKELETAL: no deformity  NEURO: alert and appropriate, moves all extremities, follows commands.  No focal motor or sensory changes  SKIN: warm, dry    Vital signs and nursing notes reviewed.    Plan I reviewed the lab work patient has leukocytosis Floxin of 20,000 also has elevation of her calcitonin.  CT scan was reviewed and she has signs of cystitis and left pyelonephritis.  Previous UTI earlier this year was positive for E. coli that was sensitive to cephalosporins.    This patient will need to be admitted to the hospital and will start IV antibiotics.  All questions  answered  We are currently under a pandemic from the COVID19 infection.  The patient presented to the emergency department by ambulance or personal vehicle. I followed the current protocols required by Infection Control at [facility] in my evaluation and treatment of the patient. The patient was wearing a face mask during my evaluation and throughout my encounter. During my whole encounter with this patient I used appropriate personal protective equipment.  This equipment consisted of eye protection, facemask, gown, and gloves.  I applied this equipment before entering the room.           Austin Burger MD  10/01/20 1930

## 2020-10-01 NOTE — ED NOTES
Pt states for the past 4 nights she has woke up due to having to go the bathroom, pt states she ends up urinating on herself before she can make it to the toilet.  Pt denies any diarrhea or fecal incontinence.  Pt unaware of being febrile at home. Pt does states she has some discomfort with urination along with lower back pain.   Pt states she has vomited on two separate nights, one time each night.  States she feels that was due to her urinary incontinence on herself.    Nabil Cerda, BONNIE  10/01/20 1043       Nabil Cerda, RN  10/01/20 1044       Nabil Cerda RN  10/01/20 105

## 2020-10-02 LAB
ANION GAP SERPL CALCULATED.3IONS-SCNC: 8.8 MMOL/L (ref 5–15)
BACTERIA SPEC AEROBE CULT: NORMAL
BASOPHILS # BLD AUTO: 0.05 10*3/MM3 (ref 0–0.2)
BASOPHILS NFR BLD AUTO: 0.3 % (ref 0–1.5)
BUN SERPL-MCNC: 14 MG/DL (ref 8–23)
BUN/CREAT SERPL: 18.9 (ref 7–25)
CALCIUM SPEC-SCNC: 8.6 MG/DL (ref 8.6–10.5)
CHLORIDE SERPL-SCNC: 101 MMOL/L (ref 98–107)
CO2 SERPL-SCNC: 28.2 MMOL/L (ref 22–29)
CREAT SERPL-MCNC: 0.74 MG/DL (ref 0.57–1)
DEPRECATED RDW RBC AUTO: 43 FL (ref 37–54)
EOSINOPHIL # BLD AUTO: 0.06 10*3/MM3 (ref 0–0.4)
EOSINOPHIL NFR BLD AUTO: 0.3 % (ref 0.3–6.2)
ERYTHROCYTE [DISTWIDTH] IN BLOOD BY AUTOMATED COUNT: 12.8 % (ref 12.3–15.4)
GFR SERPL CREATININE-BSD FRML MDRD: 77 ML/MIN/1.73
GLUCOSE SERPL-MCNC: 100 MG/DL (ref 65–99)
HCT VFR BLD AUTO: 38.7 % (ref 34–46.6)
HGB BLD-MCNC: 12.8 G/DL (ref 12–15.9)
IMM GRANULOCYTES # BLD AUTO: 0.11 10*3/MM3 (ref 0–0.05)
IMM GRANULOCYTES NFR BLD AUTO: 0.6 % (ref 0–0.5)
LYMPHOCYTES # BLD AUTO: 2.6 10*3/MM3 (ref 0.7–3.1)
LYMPHOCYTES NFR BLD AUTO: 14.5 % (ref 19.6–45.3)
MAGNESIUM SERPL-MCNC: 2 MG/DL (ref 1.6–2.4)
MCH RBC QN AUTO: 30.7 PG (ref 26.6–33)
MCHC RBC AUTO-ENTMCNC: 33.1 G/DL (ref 31.5–35.7)
MCV RBC AUTO: 92.8 FL (ref 79–97)
MONOCYTES # BLD AUTO: 1.82 10*3/MM3 (ref 0.1–0.9)
MONOCYTES NFR BLD AUTO: 10.1 % (ref 5–12)
NEUTROPHILS NFR BLD AUTO: 13.32 10*3/MM3 (ref 1.7–7)
NEUTROPHILS NFR BLD AUTO: 74.2 % (ref 42.7–76)
NRBC BLD AUTO-RTO: 0 /100 WBC (ref 0–0.2)
PLATELET # BLD AUTO: 258 10*3/MM3 (ref 140–450)
PMV BLD AUTO: 10.8 FL (ref 6–12)
POTASSIUM SERPL-SCNC: 3.6 MMOL/L (ref 3.5–5.2)
RBC # BLD AUTO: 4.17 10*6/MM3 (ref 3.77–5.28)
SARS-COV-2 RNA RESP QL NAA+PROBE: NOT DETECTED
SODIUM SERPL-SCNC: 138 MMOL/L (ref 136–145)
WBC # BLD AUTO: 17.96 10*3/MM3 (ref 3.4–10.8)

## 2020-10-02 PROCEDURE — 25010000002 INFLUENZA VAC SPLIT QUAD 0.5 ML SUSPENSION PREFILLED SYRINGE: Performed by: INTERNAL MEDICINE

## 2020-10-02 PROCEDURE — 25010000003 CEFTRIAXONE PER 250 MG: Performed by: INTERNAL MEDICINE

## 2020-10-02 PROCEDURE — 90686 IIV4 VACC NO PRSV 0.5 ML IM: CPT | Performed by: INTERNAL MEDICINE

## 2020-10-02 PROCEDURE — 80048 BASIC METABOLIC PNL TOTAL CA: CPT | Performed by: INTERNAL MEDICINE

## 2020-10-02 PROCEDURE — 99222 1ST HOSP IP/OBS MODERATE 55: CPT | Performed by: INTERNAL MEDICINE

## 2020-10-02 PROCEDURE — 85025 COMPLETE CBC W/AUTO DIFF WBC: CPT | Performed by: INTERNAL MEDICINE

## 2020-10-02 PROCEDURE — G0008 ADMIN INFLUENZA VIRUS VAC: HCPCS | Performed by: INTERNAL MEDICINE

## 2020-10-02 PROCEDURE — 83735 ASSAY OF MAGNESIUM: CPT | Performed by: INTERNAL MEDICINE

## 2020-10-02 PROCEDURE — 25010000002 ENOXAPARIN PER 10 MG: Performed by: INTERNAL MEDICINE

## 2020-10-02 RX ORDER — OXYBUTYNIN CHLORIDE 5 MG/1
5 TABLET, EXTENDED RELEASE ORAL DAILY
Status: DISCONTINUED | OUTPATIENT
Start: 2020-10-02 | End: 2020-10-04 | Stop reason: HOSPADM

## 2020-10-02 RX ADMIN — ACETAMINOPHEN 650 MG: 325 TABLET, FILM COATED ORAL at 08:06

## 2020-10-02 RX ADMIN — MONTELUKAST SODIUM 10 MG: 10 TABLET, FILM COATED ORAL at 20:06

## 2020-10-02 RX ADMIN — MELOXICAM 15 MG: 15 TABLET ORAL at 08:05

## 2020-10-02 RX ADMIN — POTASSIUM CHLORIDE 40 MEQ: 10 CAPSULE, COATED, EXTENDED RELEASE ORAL at 05:36

## 2020-10-02 RX ADMIN — VENLAFAXINE HYDROCHLORIDE 150 MG: 150 CAPSULE, EXTENDED RELEASE ORAL at 08:04

## 2020-10-02 RX ADMIN — CEFTRIAXONE SODIUM 2 G: 2 INJECTION, SOLUTION INTRAVENOUS at 13:54

## 2020-10-02 RX ADMIN — POTASSIUM CHLORIDE 20 MEQ: 10 CAPSULE, COATED, EXTENDED RELEASE ORAL at 08:04

## 2020-10-02 RX ADMIN — SODIUM CHLORIDE, PRESERVATIVE FREE 10 ML: 5 INJECTION INTRAVENOUS at 08:04

## 2020-10-02 RX ADMIN — SODIUM CHLORIDE, PRESERVATIVE FREE 10 ML: 5 INJECTION INTRAVENOUS at 20:08

## 2020-10-02 RX ADMIN — SODIUM CHLORIDE, POTASSIUM CHLORIDE, SODIUM LACTATE AND CALCIUM CHLORIDE 100 ML/HR: 600; 310; 30; 20 INJECTION, SOLUTION INTRAVENOUS at 20:05

## 2020-10-02 RX ADMIN — INFLUENZA VIRUS VACCINE 0.5 ML: 15; 15; 15; 15 SUSPENSION INTRAMUSCULAR at 13:03

## 2020-10-02 RX ADMIN — ENOXAPARIN SODIUM 40 MG: 40 INJECTION SUBCUTANEOUS at 18:42

## 2020-10-02 RX ADMIN — AMLODIPINE BESYLATE 5 MG: 5 TABLET ORAL at 20:05

## 2020-10-02 RX ADMIN — OXYBUTYNIN CHLORIDE 5 MG: 5 TABLET, EXTENDED RELEASE ORAL at 13:03

## 2020-10-02 RX ADMIN — ACETAMINOPHEN 650 MG: 325 TABLET, FILM COATED ORAL at 20:08

## 2020-10-02 RX ADMIN — POTASSIUM CHLORIDE 20 MEQ: 10 CAPSULE, COATED, EXTENDED RELEASE ORAL at 18:42

## 2020-10-02 RX ADMIN — SODIUM CHLORIDE, POTASSIUM CHLORIDE, SODIUM LACTATE AND CALCIUM CHLORIDE 100 ML/HR: 600; 310; 30; 20 INJECTION, SOLUTION INTRAVENOUS at 05:36

## 2020-10-02 NOTE — SIGNIFICANT NOTE
10/02/20 0645   OTHER   Discipline physical therapist   Rehab Time/Intention   Session Not Performed   (nsg doc pt up with sba. No indication for acute skilled PT.  [place] [facility] [place]

## 2020-10-02 NOTE — CONSULTS
"Referring Provider: Chika Leo MD     Subjective   History of present illness:   Very nice 70-year-old with a 5-day history of acute on chronic urinary incontinence associated with some dysuria and tremendous fatigue.  She worsened acutely yesterday on 10/1/2020 and started having fevers so came to the emergency department.  Here she underwent a CT of the abdomen pelvis that showed acute cystitis with left pyelonephritis.  She was started on ceftriaxone and feels better this morning.  She had some mild back pain but no CVS tenderness  Past Medical History:   Diagnosis Date   • Acute non-recurrent maxillary sinusitis 10/25/2016   • Anatomical narrow angle borderline glaucoma    • Cholelithiasis     s/p cholecystectomy 2007   • Diverticulosis    • Headache    • Hematuria     2004, \"-\" w/u by  - essential microscopic hematuria   • Hemorrhoids    • Hypertension    • Menopause    • MRSA (methicillin resistant Staphylococcus aureus) infection     Facial cellulitis       Past Surgical History:   Procedure Laterality Date   • ABDOMINAL SURGERY     • CHOLECYSTECTOMY     • COLONOSCOPY N/A 4/18/2016    Dr. Sonia MD; nl   • TONSILLECTOMY         No family history of infectious diseases  Social history: She lives with her  here in [place], [place].  Retired.  Non-smoker.    No Known Allergies    Review of Systems  Pertinent items are noted in HPI, all other systems reviewed and negative    Objective     Physical Exam:   Vital Signs   Temp:  [97.9 °F (36.6 °C)-99.5 °F (37.5 °C)] 99.5 °F (37.5 °C)  Heart Rate:  [] 103  Resp:  [12-18] 12  BP: (115-145)/(56-83) 115/66    GENERAL: Awake and alert, in no acute distress.   HEENT: Oropharynx is clear. Hearing is grossly normal.   EYES: PERRL. No conjunctival injection. No lid lag.   LYMPHATICS: No lymphadenopathy of the neck or inguinal regions.   HEART: Regular rate and regular rhythm. No peripheral edema.   LUNGS: Clear to auscultation anteriorly " with normal respiratory effort.   GI: Soft, nontender, nondistended. No appreciable organomegaly.  No CVA tenderness  SKIN: Warm and dry without cutaneous eruptions   PSYCHIATRIC: Appropriate mood, affect, insight, and judgment.     Results Review:  Liver function test normal except for an AST of 45  Creatinine 0.74  Glucose 100  Procalcitonin was 3.3 on admission  White count 20.9 on admission down to 17.96 with 74% neutrophils, 15% lymphocytes, 10% monocytes  Hemoglobin 12.8  Platelets 258       Microbiology:  10/1/2020 urine culture grew greater than 100,000 mixed jessi  Blood cultures no growth to date      Radiology:  CT of the pelvis shows acute cystitis and left pyelonephritis    Assessment/Plan   1.  Sepsis secondary to acute cystitis and left pyelonephritis     She seems to be improving on the ceftriaxone and will continue while inpatient.  Urine culture grew just mixed jessi.  Provided blood cultures stay negative, leukocytosis continues to improve and she is afebrile, no objection to discharge in a.m on Bactrim double strength 1 tablet through 10/10/2020    Thank you for this consult.  We will continue to follow along and tailor antibiotics as the patient's clinical course evolves.      Masood Santoyo MD  10/02/20  11:08 EDT

## 2020-10-02 NOTE — PLAN OF CARE
Goal Outcome Evaluation:  Plan of Care Reviewed With: patient  Progress: no change  Outcome Summary: VSS pt up to restroom with standby assist. LR@100 infusing maintain safety and continue to monitor

## 2020-10-02 NOTE — PROGRESS NOTES
[facility] HOSPITALIST    PROGRESS NOTE    Name:  Mary Cueva   Age:  70 y.o.  Sex:  female  :  1950  MRN:  [id number]   Visit Number:  [id number]  Admission Date:  10/1/2020  Date Of Service:  10/02/20  Primary Care Physician:  Lamonte Christopher MD     LOS: 1 day :  Patient Care Team:  Lamonte Christopher MD as PCP - General (Family Medicine)  Roxy Knight MD as PCP - Claims Attributed  Roxy Knight MD as Referring Physician (Internal Medicine):    History taken from:     patient chart    Chief Complaint:      Weakness    Subjective     Interval History:     Patient seen and examined today.  Reviewed history and physical, consults, lab work, x-rays, chart.    Patient is a 70-year-old female who came to emergency room with weakness for a few days.  She has had intermittent vomiting as well.  Testing in the emergency room showed UTI with symptoms of sepsis.  CT showed pyelonephritis of the left kidney.  She was given IV fluids and admitted.  She was placed on Rocephin.  WBC count is improved.  Infectious disease has seen the patient and feels the patient is appropriately being treated.  Unfortunately the culture is contaminated, so we will have to go by symptoms.  Patient currently denies any chest pressure, shortness of breath, nausea, vomiting, pain.  She does have urinary incontinence, will add Ditropan.        Review of Systems:     All systems were reviewed and negative except for:  Genitourinary: postivie for  urinary incontinence    Objective     Vital Signs:    Temp:  [97.9 °F (36.6 °C)-99.5 °F (37.5 °C)] 98 °F (36.7 °C)  Heart Rate:  [] 89  Resp:  [12-18] 12  BP: (107-145)/(56-79) 107/59    Physical Exam:    General: Alert and oriented x4, well-developed well-nourished, mild distress  Heart: Regular rate and rhythm without murmur rub or thrill  Lungs: Clear to auscultation bilaterally without use of accessory muscles or respiration.  Abdomen:  Soft/nontender/nondistended.  No hepatosplenomegaly is noted.  MSK: 5/5 strength in upper/lower extremities bilaterally.     Results Review:      I reviewed the patient's new clinical results.    Labs:    Lab Results (last 24 hours)     Procedure Component Value Units Date/Time    COVID PRE-OP / PRE-PROCEDURE SCREENING ORDER (NO ISOLATION) - Swab, Nasopharynx [[id number]] Collected: 10/01/20 1346    Specimen: Swab from Nasopharynx Updated: 10/02/20 1150    Narrative:      The following orders were created for panel order COVID PRE-OP / PRE-PROCEDURE SCREENING ORDER (NO ISOLATION) - Swab, Nasopharynx.  Procedure                               Abnormality         Status                     ---------                               -----------         ------                     COVID-19,BIOTAP, NP/OP S...[[id number]]                      Final result                 Please view results for these tests on the individual orders.    COVID-19,BIOTAP, NP/OP SWAB IN TRANSPORT MEDIA OR SALINE 24-36 HR TAT - Swab, Nasopharynx [[id number]] Collected: 10/01/20 1346    Specimen: Swab from Nasopharynx Updated: 10/02/20 1150     SARS-CoV-2 PCR Not Detected     Comment: Nucleic acid specific to SARS-CoV-2 (COVID-19) virus was not detected inthis sample by the TaqPath (TM) COVID-19 Combo Kit.SARS-CoV-2 (COVID-19) nucleic acid testing performed using [employer] Aptima (R) SARS-CoV-2 Assay or [employer] TaqPath   (TM)COVID-19 Combo Kit as indicated above under Emergency Use Authorization (EUA) from the FDA. Aptima (R) and TaqPath (TM) test performancecharacteristics verified by [facility] in accordance with the FDAs Guidance Document (Policy for Diagnostic   Tests for Coronavirus Disease-2019during the Public Health Emergency) issued on March 16, 2020. The laboratory is regulated under CLIA as qualified to perform high-complexity testingUnless otherwise noted, all testing was performed at [facility],   CLIA No. [id number],  [place] State Licensee No. [id number]       Blood Culture - Blood, Arm, Left [[id number]] Collected: 10/01/20 1136    Specimen: Blood from Arm, Left Updated: 10/02/20 1146     Blood Culture No growth at 24 hours    Blood Culture - Blood, Arm, Right [[id number]] Collected: 10/01/20 1135    Specimen: Blood from Arm, Right Updated: 10/02/20 1146     Blood Culture No growth at 24 hours    Urine Culture - Urine, Urine, Clean Catch [[id number]] Collected: 10/01/20 1342    Specimen: Urine, Clean Catch Updated: 10/02/20 0850     Urine Culture >100,000 CFU/mL Mixed Jessi Isolated    Narrative:      Specimen contains mixed organisms of questionable pathogenicity which indicates contamination with commensal jessi.  Further identification is unlikely to provide clinically useful information.  Suggest recollection.    Basic Metabolic Panel [[id number]]  (Abnormal) Collected: 10/02/20 0521    Specimen: Blood Updated: 10/02/20 0617     Glucose 100 mg/dL      BUN 14 mg/dL      Creatinine 0.74 mg/dL      Sodium 138 mmol/L      Potassium 3.6 mmol/L      Chloride 101 mmol/L      CO2 28.2 mmol/L      Calcium 8.6 mg/dL      eGFR Non African Amer 77 mL/min/1.73      BUN/Creatinine Ratio 18.9     Anion Gap 8.8 mmol/L     Narrative:      GFR Normal >60  Chronic Kidney Disease <60  Kidney Failure <15      Magnesium [[id number]]  (Normal) Collected: 10/02/20 0521    Specimen: Blood Updated: 10/02/20 0617     Magnesium 2.0 mg/dL     CBC Auto Differential [[id number]]  (Abnormal) Collected: 10/02/20 0521    Specimen: Blood Updated: 10/02/20 0556     WBC 17.96 10*3/mm3      RBC 4.17 10*6/mm3      Hemoglobin 12.8 g/dL      Hematocrit 38.7 %      MCV 92.8 fL      MCH 30.7 pg      MCHC 33.1 g/dL      RDW 12.8 %      RDW-SD 43.0 fl      MPV 10.8 fL      Platelets 258 10*3/mm3      Neutrophil % 74.2 %      Lymphocyte % 14.5 %      Monocyte % 10.1 %      Eosinophil % 0.3 %      Basophil % 0.3 %      Immature Grans % 0.6 %      Neutrophils, Absolute 13.32 10*3/mm3       Lymphocytes, Absolute 2.60 10*3/mm3      Monocytes, Absolute 1.82 10*3/mm3      Eosinophils, Absolute 0.06 10*3/mm3      Basophils, Absolute 0.05 10*3/mm3      Immature Grans, Absolute 0.11 10*3/mm3      nRBC 0.0 /100 WBC     Magnesium [[id number]]  (Normal) Collected: 10/01/20 1048    Specimen: Blood Updated: 10/01/20 1523     Magnesium 1.7 mg/dL            Radiology:    Imaging Results (Last 24 Hours)     Procedure Component Value Units Date/Time    CT Abdomen Pelvis With Contrast [[id number]] Collected: 10/01/20 1319     Updated: 10/02/20 0624    Narrative:      CT ABDOMEN AND PELVIS WITH IV CONTRAST     HISTORY: 70-year-old female with dysuria, low back pain, and  leukocytosis.     TECHNIQUE: Radiation dose reduction techniques were utilized, including  automated exposure control and exposure modulation based on body size.   3 mm images were obtained through the abdomen and pelvis after the  administration of IV contrast. Compared with previous CT from  11/15/2004.     FINDINGS: There is very mild dilatation and thickening of the entire  left ureter and there is a slightly striated enhancement pattern of the  left kidney. Urinary bladder is thick-walled, but is partially  collapsed. There are no renal or ureteral stones bilaterally. There is a  1.1 cm left renal cyst, previously 4-5 mm. The liver appears  unremarkable other than several small left hepatic lobe cysts which  appear largely unchanged. The gallbladder is surgically absent. There is  no biliary dilatation. The spleen, pancreas, and adrenals appear  unremarkable. No acute bowel abnormality is seen. There is moderate  sigmoid diverticulosis without evidence for diverticulitis. Uterus and  adnexa appear unremarkable. Two nabothian cysts are noted within the  cervix.       Impression:      There is acute cystitis and left pyelonephritis.     Discussed with PEDRO Mercer.     This report was finalized on 10/2/2020 6:21 AM by Dr. Linda Penn M.D.              Medication Review:     amLODIPine, 5 mg, Oral, Nightly  cefTRIAXone, 2 g, Intravenous, Q24H  fluticasone, 2 spray, Nasal, Daily  meloxicam, 15 mg, Oral, Daily  montelukast, 10 mg, Oral, Nightly  oxybutynin XL, 5 mg, Oral, Daily  potassium chloride, 20 mEq, Oral, BID With Meals  sodium chloride, 10 mL, Intravenous, Q12H  venlafaxine XR, 150 mg, Oral, Daily        lactated ringers, 100 mL/hr, Last Rate: 100 mL/hr (10/02/20 0536)        Assessment/Plan     Problem List Items Addressed This Visit        Genitourinary    Pyelonephritis of left kidney - Primary      Other Visit Diagnoses     Non-intractable vomiting with nausea, unspecified vomiting type              1.  Acute pyelonephritis of the left kidney  2.  UTI, present on admission  3.  Sepsis, present admission, due to #1 and #2.  4.  Essential hypertension  5.  Mild hyperglycemia.  6.  Weakness  7.  Urinary incontinence    Plan:    Continue with Rocephin.  Appreciate infectious disease input.  Check lab work in the a.m.  Cultures contaminated.  Continue with home medications.  We will add Ditropan.  Lovenox for DVT prophylaxis.  Further recommendations will depend on clinical course.    Satnam Mistry DO  10/02/20  14:46 EDT

## 2020-10-02 NOTE — PROGRESS NOTES
Discharge Planning Assessment  [facility]     Patient Name: Mary Cueva  MRN: [id number]  Today's Date: 10/2/2020    Admit Date: 10/1/2020    Discharge Needs Assessment     Row Name 10/02/20 0952       Living Environment    Lives With  spouse    Name(s) of Who Lives With Patient  Max Cueva [phone]    Current Living Arrangements  home/apartment/condo    Potentially Unsafe Housing Conditions  other (see comments) no concerns    Primary Care Provided by  self    Provides Primary Care For  no one    Family Caregiver if Needed  spouse    Family Caregiver Names  Max Cueva [phone]    Quality of Family Relationships  helpful;involved;supportive    Able to Return to Prior Arrangements  yes       Resource/Environmental Concerns    Resource/Environmental Concerns  none    Transportation Concerns  car, none       Transition Planning    Patient/Family Anticipates Transition to  home    Patient/Family Anticipated Services at Transition  none    Transportation Anticipated  family or friend will provide       Discharge Needs Assessment    Readmission Within the Last 30 Days  no previous admission in last 30 days    Equipment Currently Used at Home  none    Concerns to be Addressed  denies needs/concerns at this time    Anticipated Changes Related to Illness  none    Equipment Needed After Discharge  none    Provided Post Acute Provider List?  Yes    Post Acute Provider List  Home Health;Nursing Home    Provided Post Acute Provider Quality & Resource List?  N/A    N/A Quality & Resource List Comment  Patient denies needs    Delivered To  Patient    Method of Delivery  In person        Discharge Plan     Row Name 10/02/20 0953       Plan    Plan  Home with spouse, denies needs    Patient/Family in Agreement with Plan  yes    Plan Comments  Met with patient at bedside. Role explained and discharge planning discussed. Face sheet verified and IMM noted. PCP is Dr. Lamonte Christopher. Patient lives with her spouse Max Cueva  [phone] in a single level home with two steps at the entrance. Patient does not use DME and is independent with her ADLs. Patient denies past home health or subacute rehab history. Patient was provided copy of HH/SNF list and declined ratings or referrals at this time due to not anticipating any needs. Pharmacy is [facility] in [place]. Spouse will transport at discharge. Patient denies any discharge needs. CCP will follow for ID recommendations, IV antibiotics, and to assist with discharge home. Dianelys CHAVEZ        Continued Care and Services - Admitted Since 10/1/2020    Coordination has not been started for this encounter.         Demographic Summary     Row Name 10/02/20 0952       General Information    Admission Type  inpatient    Arrived From  emergency department    Required Notices Provided  Important Message from Medicare    Referral Source  admission list    Reason for Consult  discharge planning    Preferred Language  English     Used During This Interaction  no        Functional Status     Row Name 10/02/20 0952       Functional Status    Usual Activity Tolerance  good    Current Activity Tolerance  good       Functional Status, IADL    Medications  independent    Meal Preparation  independent    Housekeeping  independent    Laundry  independent    Shopping  independent       Mental Status    General Appearance WDL  WDL       Mental Status Summary    Recent Changes in Mental Status/Cognitive Functioning  no changes        Psychosocial    No documentation.       Abuse/Neglect    No documentation.       Legal    No documentation.       Substance Abuse    No documentation.       Patient Forms    No documentation.           YAMILEX Aguirre

## 2020-10-03 ENCOUNTER — APPOINTMENT (OUTPATIENT)
Dept: GENERAL RADIOLOGY | Facility: HOSPITAL | Age: 70
End: 2020-10-03

## 2020-10-03 ENCOUNTER — APPOINTMENT (OUTPATIENT)
Dept: CT IMAGING | Facility: HOSPITAL | Age: 70
End: 2020-10-03

## 2020-10-03 LAB
ALBUMIN SERPL-MCNC: 3 G/DL (ref 3.5–5.2)
ANION GAP SERPL CALCULATED.3IONS-SCNC: 9.8 MMOL/L (ref 5–15)
BASOPHILS # BLD AUTO: 0.06 10*3/MM3 (ref 0–0.2)
BASOPHILS NFR BLD AUTO: 0.4 % (ref 0–1.5)
BUN SERPL-MCNC: 14 MG/DL (ref 8–23)
BUN/CREAT SERPL: 22.2 (ref 7–25)
CALCIUM SPEC-SCNC: 8.6 MG/DL (ref 8.6–10.5)
CHLORIDE SERPL-SCNC: 105 MMOL/L (ref 98–107)
CO2 SERPL-SCNC: 24.2 MMOL/L (ref 22–29)
CREAT SERPL-MCNC: 0.63 MG/DL (ref 0.57–1)
CRP SERPL-MCNC: 21.31 MG/DL (ref 0–0.5)
DEPRECATED RDW RBC AUTO: 44.4 FL (ref 37–54)
EOSINOPHIL # BLD AUTO: 0.17 10*3/MM3 (ref 0–0.4)
EOSINOPHIL NFR BLD AUTO: 1.2 % (ref 0.3–6.2)
ERYTHROCYTE [DISTWIDTH] IN BLOOD BY AUTOMATED COUNT: 12.8 % (ref 12.3–15.4)
GFR SERPL CREATININE-BSD FRML MDRD: 93 ML/MIN/1.73
GLUCOSE SERPL-MCNC: 84 MG/DL (ref 65–99)
HCT VFR BLD AUTO: 34.5 % (ref 34–46.6)
HGB BLD-MCNC: 11.7 G/DL (ref 12–15.9)
IMM GRANULOCYTES # BLD AUTO: 0.07 10*3/MM3 (ref 0–0.05)
IMM GRANULOCYTES NFR BLD AUTO: 0.5 % (ref 0–0.5)
LYMPHOCYTES # BLD AUTO: 2.23 10*3/MM3 (ref 0.7–3.1)
LYMPHOCYTES NFR BLD AUTO: 15.8 % (ref 19.6–45.3)
MAGNESIUM SERPL-MCNC: 1.9 MG/DL (ref 1.6–2.4)
MCH RBC QN AUTO: 31.9 PG (ref 26.6–33)
MCHC RBC AUTO-ENTMCNC: 33.9 G/DL (ref 31.5–35.7)
MCV RBC AUTO: 94 FL (ref 79–97)
MONOCYTES # BLD AUTO: 1.23 10*3/MM3 (ref 0.1–0.9)
MONOCYTES NFR BLD AUTO: 8.7 % (ref 5–12)
NEUTROPHILS NFR BLD AUTO: 10.31 10*3/MM3 (ref 1.7–7)
NEUTROPHILS NFR BLD AUTO: 73.4 % (ref 42.7–76)
NRBC BLD AUTO-RTO: 0 /100 WBC (ref 0–0.2)
PHOSPHATE SERPL-MCNC: 2.3 MG/DL (ref 2.5–4.5)
PLATELET # BLD AUTO: 220 10*3/MM3 (ref 140–450)
PMV BLD AUTO: 11.7 FL (ref 6–12)
POTASSIUM SERPL-SCNC: 4 MMOL/L (ref 3.5–5.2)
RBC # BLD AUTO: 3.67 10*6/MM3 (ref 3.77–5.28)
SODIUM SERPL-SCNC: 139 MMOL/L (ref 136–145)
WBC # BLD AUTO: 14.07 10*3/MM3 (ref 3.4–10.8)

## 2020-10-03 PROCEDURE — 85025 COMPLETE CBC W/AUTO DIFF WBC: CPT | Performed by: INTERNAL MEDICINE

## 2020-10-03 PROCEDURE — 93010 ELECTROCARDIOGRAM REPORT: CPT | Performed by: INTERNAL MEDICINE

## 2020-10-03 PROCEDURE — 97110 THERAPEUTIC EXERCISES: CPT | Performed by: OCCUPATIONAL THERAPIST

## 2020-10-03 PROCEDURE — 97165 OT EVAL LOW COMPLEX 30 MIN: CPT | Performed by: OCCUPATIONAL THERAPIST

## 2020-10-03 PROCEDURE — 86140 C-REACTIVE PROTEIN: CPT | Performed by: INTERNAL MEDICINE

## 2020-10-03 PROCEDURE — 71045 X-RAY EXAM CHEST 1 VIEW: CPT

## 2020-10-03 PROCEDURE — 0 IOPAMIDOL PER 1 ML: Performed by: INTERNAL MEDICINE

## 2020-10-03 PROCEDURE — 93005 ELECTROCARDIOGRAM TRACING: CPT | Performed by: INTERNAL MEDICINE

## 2020-10-03 PROCEDURE — 80069 RENAL FUNCTION PANEL: CPT | Performed by: INTERNAL MEDICINE

## 2020-10-03 PROCEDURE — 83735 ASSAY OF MAGNESIUM: CPT | Performed by: INTERNAL MEDICINE

## 2020-10-03 PROCEDURE — 25010000003 CEFTRIAXONE PER 250 MG: Performed by: INTERNAL MEDICINE

## 2020-10-03 PROCEDURE — 25010000002 ENOXAPARIN PER 10 MG: Performed by: INTERNAL MEDICINE

## 2020-10-03 PROCEDURE — 71275 CT ANGIOGRAPHY CHEST: CPT

## 2020-10-03 RX ADMIN — SODIUM CHLORIDE, PRESERVATIVE FREE 10 ML: 5 INJECTION INTRAVENOUS at 09:53

## 2020-10-03 RX ADMIN — AMLODIPINE BESYLATE 5 MG: 5 TABLET ORAL at 20:36

## 2020-10-03 RX ADMIN — POTASSIUM CHLORIDE 20 MEQ: 10 CAPSULE, COATED, EXTENDED RELEASE ORAL at 17:40

## 2020-10-03 RX ADMIN — IOPAMIDOL 95 ML: 755 INJECTION, SOLUTION INTRAVENOUS at 19:30

## 2020-10-03 RX ADMIN — SODIUM PHOSPHATE, MONOBASIC, MONOHYDRATE 20 MMOL: 276; 142 INJECTION, SOLUTION INTRAVENOUS at 22:51

## 2020-10-03 RX ADMIN — FLUTICASONE PROPIONATE 2 SPRAY: 50 SPRAY, METERED NASAL at 09:53

## 2020-10-03 RX ADMIN — CEFTRIAXONE SODIUM 2 G: 2 INJECTION, SOLUTION INTRAVENOUS at 11:39

## 2020-10-03 RX ADMIN — MONTELUKAST SODIUM 10 MG: 10 TABLET, FILM COATED ORAL at 20:36

## 2020-10-03 RX ADMIN — POTASSIUM CHLORIDE 20 MEQ: 10 CAPSULE, COATED, EXTENDED RELEASE ORAL at 09:53

## 2020-10-03 RX ADMIN — VENLAFAXINE HYDROCHLORIDE 150 MG: 150 CAPSULE, EXTENDED RELEASE ORAL at 09:53

## 2020-10-03 RX ADMIN — MELOXICAM 15 MG: 15 TABLET ORAL at 09:53

## 2020-10-03 RX ADMIN — ENOXAPARIN SODIUM 40 MG: 40 INJECTION SUBCUTANEOUS at 15:39

## 2020-10-03 RX ADMIN — OXYBUTYNIN CHLORIDE 5 MG: 5 TABLET, EXTENDED RELEASE ORAL at 09:53

## 2020-10-03 RX ADMIN — SODIUM CHLORIDE, PRESERVATIVE FREE 10 ML: 5 INJECTION INTRAVENOUS at 20:37

## 2020-10-03 RX ADMIN — SODIUM CHLORIDE, POTASSIUM CHLORIDE, SODIUM LACTATE AND CALCIUM CHLORIDE 100 ML/HR: 600; 310; 30; 20 INJECTION, SOLUTION INTRAVENOUS at 06:14

## 2020-10-03 NOTE — PLAN OF CARE
Goal Outcome Evaluation:  Plan of Care Reviewed With: patient  Progress: improving  Outcome Summary: Pt remains on IVF. C/o R shoulder pain, relieved with Tylenol. Ambulating to bathroom. Patient placed on 2L to keep sats above 90%, given IS and education provided. Continue to monitor.

## 2020-10-03 NOTE — PLAN OF CARE
Goal Outcome Evaluation:  Plan of Care Reviewed With: patient  Progress: improving  Outcome Summary: Night RN said required 2 liters O2 soon before shift change & have coarse crackles in lungs.  Pt having increased heart rate this shift. HR in 110s at rest & up to 150s with activity. Afebrile. Chest xray ordered. Fluids D/C'd. CT chest & Echo ordered. Pt says she feels better than yesterday. Pt's spouse Max visited. Up ad davon. Safety maintained, will continue to monitor.

## 2020-10-03 NOTE — THERAPY DISCHARGE NOTE
"Acute Care - Occupational Therapy Discharge  [facility]    Patient Name: Mary Cueva  : 1950    MRN: [id number]                              Today's Date: 10/3/2020       Admit Date: 10/1/2020    Visit Dx:     ICD-10-CM ICD-9-CM   1. Pyelonephritis of left kidney  N12 590.80   2. Non-intractable vomiting with nausea, unspecified vomiting type  R11.2 787.01     Patient Active Problem List   Diagnosis   • Borderline glaucoma with anatomical narrow angle   • Osteopenia   • Vitamin D deficiency   • Adiposity   • Atopic rhinitis   • Anxiety disorder   • Benign essential hypertension   • Health care maintenance   • Allergic sinusitis   • Insomnia secondary to anxiety   • Neutrophilic leukocytosis   • Upper back pain, chronic   • Pyelonephritis of left kidney     Past Medical History:   Diagnosis Date   • Acute non-recurrent maxillary sinusitis 10/25/2016   • Anatomical narrow angle borderline glaucoma    • Cholelithiasis     s/p cholecystectomy    • Diverticulosis    • Headache    • Hematuria     , \"-\" w/u by  - essential microscopic hematuria   • Hemorrhoids    • Hypertension    • Menopause    • MRSA (methicillin resistant Staphylococcus aureus) infection     Facial cellulitis     Past Surgical History:   Procedure Laterality Date   • ABDOMINAL SURGERY     • CHOLECYSTECTOMY     • COLONOSCOPY N/A 2016    Dr. Sonia MD; nl   • TONSILLECTOMY       General Information     Row Name 10/03/20 1536          OT Time and Intention    Document Type  discharge evaluation/summary  -     Mode of Treatment  individual therapy;occupational therapy  -     Row Name 10/03/20 1536          General Information    Patient Profile Reviewed  yes  -KP     Prior Level of Function  independent:;transfer;ADL's  -     Existing Precautions/Restrictions  no known precautions/restrictions  -     Barriers to Rehab  none identified  -     Row Name 10/03/20 1536          Living Environment    Lives With  spouse  " -     Row Name 10/03/20 1536          Cognition    Orientation Status (Cognition)  oriented x 4  -       User Key  (r) = Recorded By, (t) = Taken By, (c) = Cosigned By    Initials Name Provider Type    Salome Olson OTR Occupational Therapist        Mobility/ADL's     Row Name 10/03/20 1536          Bed Mobility    Bed Mobility  bed mobility (all) activities  -     All Activities, Dunn (Bed Mobility)  independent  -     Row Name 10/03/20 1536          Transfers    Transfers  sit-stand transfer;toilet transfer  -     Sit-Stand Dunn (Transfers)  independent;modified independence  -     Dunn Level (Toilet Transfer)  independent;modified independence  -     Row Name 10/03/20 1536          Toilet Transfer    Type (Toilet Transfer)  sit-stand;stand-sit  -[facility] Name 10/03/20 1536          Functional Mobility    Functional Mobility- Ind. Level  independent;conditional independence  -     Functional Mobility-Distance (Feet)  -- in room  -[facility] Name 10/03/20 1536          Activities of Daily Living    BADL Assessment/Intervention  bathing;toileting  -[facility] Name 10/03/20 1536          Bathing Assessment/Intervention    Comment (Bathing)  no A per pt  -[facility] Name 10/03/20 1536          Toileting Assessment/Training    Dunn Level (Toileting)  toileting skills;independent  -     Position (Toileting)  supported standing;supported sitting  -       User Key  (r) = Recorded By, (t) = Taken By, (c) = Cosigned By    Initials Name Provider Type    Salome Olson OTR Occupational Therapist        Obj/Interventions     Row Name 10/03/20 1537          Sensory Assessment (Somatosensory)    Sensory Assessment (Somatosensory)  sensation intact  -     Row Name 10/03/20 1537          Vision Assessment/Intervention    Visual Impairment/Limitations  WNL  -     Row Name 10/03/20 1537          Range of Motion Comprehensive    General Range of Motion   no range of motion deficits identified  -     Row Name 10/03/20 1537          Strength Comprehensive (MMT)    General Manual Muscle Testing (MMT) Assessment  no strength deficits identified  -     Comment, General Manual Muscle Testing (MMT) Assessment  ed on UE ex at home w. wts 2-3 #s  -     Row Name 10/03/20 1537          Balance    Balance Assessment  sitting static balance;sitting dynamic balance;sit to stand dynamic balance;standing static balance  -     Static Sitting Balance  WFL  -KP     Dynamic Sitting Balance  WFL  -KP     Sit to Stand Dynamic Balance  WFL  -KP     Static Standing Balance  WFL  -KP       User Key  (r) = Recorded By, (t) = Taken By, (c) = Cosigned By    Initials Name Provider Type    Salome Olson, OTR Occupational Therapist        Goals/Plan    No documentation.       Clinical Impression     [facility] Name 10/03/20 1538          Pain Assessment    Additional Documentation  Pain Scale: Numbers Pre/Post-Treatment (Group)  -KP     Row Name 10/03/20 1538          Pain Scale: Numbers Pre/Post-Treatment    Pretreatment Pain Rating  0/10 - no pain  -     Posttreatment Pain Rating  0/10 - no pain  -[facility] Name 10/03/20 1538          Plan of Care Review    Plan of Care Reviewed With  patient  -     Progress  improving  -     Outcome Summary  pt evaluated for OT. pt was independent w. tsf to BR, toilet, sitting balance, and ADLS. ed p on UE ex w. wts at home to increase her endurnace and strength. no further OT needs. OT signing off.  -     Row Name 10/03/20 1538          Therapy Assessment/Plan (OT)    Criteria for Skilled Therapeutic Interventions Met (OT)  no problems identified which require skilled intervention  -     Therapy Frequency (OT)  evaluation only  -     Row Name 10/03/20 1538          Therapy Plan Review/Discharge Plan (OT)    Anticipated Discharge Disposition (OT)  home w spouse  -[facility] Name 10/03/20 1538          Positioning and Restraints     Pre-Treatment Position  in bed  -KP     Post Treatment Position  bed  -KP     In Bed  sitting EOB;call light within reach;encouraged to call for assist;with family/caregiver  -       User Key  (r) = Recorded By, (t) = Taken By, (c) = Cosigned By    Initials Name Provider Type    Salome Olson OTR Occupational Therapist        Outcome Measures     Row Name 10/03/20 1540          How much help from another is currently needed...    Putting on and taking off regular lower body clothing?  4  -KP     Bathing (including washing, rinsing, and drying)  4  -KP     Toileting (which includes using toilet bed pan or urinal)  4  -KP     Putting on and taking off regular upper body clothing  4  -KP     Taking care of personal grooming (such as brushing teeth)  4  -KP     Eating meals  4  -KP     AM-PAC 6 Clicks Score (OT)  24  -     Row Name 10/03/20 1540          Functional Assessment    Outcome Measure Options  AM-PAC 6 Clicks Daily Activity (OT)  -       User Key  (r) = Recorded By, (t) = Taken By, (c) = Cosigned By    Initials Name Provider Type    Salome Olson OTR Occupational Therapist        Occupational Therapy Education                 Title: PT OT SLP Therapies (Resolved)     Topic: Occupational Therapy (Resolved)     Point: ADL training (Resolved)     Description:   Instruct learner(s) on proper safety adaptation and remediation techniques during self care or transfers.   Instruct in proper use of assistive devices.              Learning Progress Summary           Patient Acceptance, E,TB, VU,DU by  at 10/3/2020 1541    Comment: ed pt on role of OT, benefit of therapy, and ed on UE ex for home and at hospital to incr strength and endurance.                   Point: Precautions (Resolved)     Description:   Instruct learner(s) on prescribed precautions during self-care and functional transfers.              Learning Progress Summary           Patient Acceptance, E,TB, VU,DU by  at  10/3/2020 1541    Comment: ed pt on role of OT, benefit of therapy, and ed on UE ex for home and at hospital to incr strength and endurance.                   Point: Body mechanics (Resolved)     Description:   Instruct learner(s) on proper positioning and spine alignment during self-care, functional mobility activities and/or exercises.              Learning Progress Summary           Patient Acceptance, E,TB, VU,DU by  at 10/3/2020 1541    Comment: ed pt on role of OT, benefit of therapy, and ed on UE ex for home and at hospital to incr strength and endurance.                               User Key     Initials Effective Dates Name Provider Type [facility] 06/08/18 -  Salome Isidro OTR Occupational Therapist OT              OT Recommendation and Plan  Retired Outcome Summary/Treatment Plan (OT)  Anticipated Discharge Disposition (OT): home(w spouse)  Therapy Frequency (OT): evaluation only  Plan of Care Review  Plan of Care Reviewed With: patient  Progress: improving  Outcome Summary: pt evaluated for OT. pt was independent w. tsf to BR, toilet, sitting balance, and ADLS. ed p on UE ex w. wts at home to increase her endurnace and strength. no further OT needs. OT signing off.  Plan of Care Reviewed With: patient  Outcome Summary: pt evaluated for OT. pt was independent w. tsf to BR, toilet, sitting balance, and ADLS. ed p on UE ex w. wts at home to increase her endurnace and strength. no further OT needs. OT signing off.     Time Calculation:   Time Calculation- OT     Row Name 10/03/20 1542             Time Calculation- OT    OT Start Time  1452  -      OT Stop Time  1507  -      OT Time Calculation (min)  15 min  -      Total Timed Code Minutes- OT  8 minute(s)  -      OT Received On  10/03/20  -        User Key  (r) = Recorded By, (t) = Taken By, (c) = Cosigned By    Initials Name Provider Type     Salome Isidro OTR Occupational Therapist        Therapy Charges for Today      Code Description Service Date Service Provider Modifiers Qty    23926638036  OT THER PROC EA 15 MIN 10/3/2020 Salome Isidro, OTR GO 1    [id number]  OT EVAL LOW COMPLEXITY 2 10/3/2020 Salome Isidro, ALEXIA GO 1               Salome Isidro, ALEXIA  10/3/2020

## 2020-10-03 NOTE — PLAN OF CARE
Problem: Adult Inpatient Plan of Care  Goal: Plan of Care Review  Flowsheets (Taken 10/3/2020 8407)  Progress: improving  Plan of Care Reviewed With: patient  Outcome Summary: pt evaluated for OT. pt was independent w. tsf to BR, toilet, sitting balance, and ADLS. ed p on UE ex w. wts at home to increase her endurnace and strength. no further OT needs. OT signing off.   OT wore mask, gloves, hand hygiene, protective eye wear. Pt wore mask.

## 2020-10-03 NOTE — PROGRESS NOTES
[facility] HOSPITALIST    PROGRESS NOTE    Name:  Mary Cueva   Age:  70 y.o.  Sex:  female  :  1950  MRN:  [id number]   Visit Number:  [id number]  Admission Date:  10/1/2020  Date Of Service:  10/03/20  Primary Care Physician:  Lamonte Christopher MD     LOS: 2 days :  Patient Care Team:  Lamonte Christopher MD as PCP - General (Family Medicine)  Roxy Knight MD as PCP - Claims Attributed  Roxy Knight MD as Referring Physician (Internal Medicine):    History taken from:     patient chart    Chief Complaint:      Weakness    Subjective     Interval History:     Patient seen and examined again on 10/30/2020.    Patient is a 70-year-old female who came to emergency room with weakness for a few days.  She has had intermittent vomiting as well.  Testing in the emergency room showed UTI with symptoms of sepsis.  CT showed pyelonephritis of the left kidney.  She was given IV fluids and admitted.  She was placed on Rocephin.  WBC count is improved.  Infectious disease has seen the patient and feels the patient is appropriately being treated.  Unfortunately the culture is contaminated, so we will have to go by symptoms.   She does have urinary incontinence, will add Ditropan.    Patient was noted to have some hypoxia as well as tachycardia up to 150 last night.  She has significant tachycardia with any movement.  She also has some dyspnea on exertion.  She is not known to have this problem before.  EKG showed sinus tachycardia.  Chest x-ray showed no acute disease.  She has however quite deconditioned.  Will check echocardiogram as well as CT PE protocol to rule out PE / heart problems.      Review of Systems:     All systems were reviewed and negative except for:  Genitourinary: postivie for  urinary incontinence    Objective     Vital Signs:    Temp:  [98 °F (36.7 °C)-98.4 °F (36.9 °C)] 98.3 °F (36.8 °C)  Heart Rate:  [] 83  Resp:  [16-18] 16  BP: ()/(55-75)  107/65    Physical Exam:    General: Alert and oriented x4, well-developed well-nourished, mild distress  Heart: Regular rate and rhythm without murmur rub or thrill  Lungs: Clear to auscultation bilaterally without use of accessory muscles or respiration.  Abdomen: Soft/nontender/nondistended.  No hepatosplenomegaly is noted.  MSK: 5/5 strength in upper/lower extremities bilaterally.     Results Review:      I reviewed the patient's new clinical results.    Labs:    Lab Results (last 24 hours)     Procedure Component Value Units Date/Time    Blood Culture - Blood, Arm, Left [[id number]] Collected: 10/01/20 1136    Specimen: Blood from Arm, Left Updated: 10/03/20 1145     Blood Culture No growth at 2 days    Blood Culture - Blood, Arm, Right [[id number]] Collected: 10/01/20 1135    Specimen: Blood from Arm, Right Updated: 10/03/20 1145     Blood Culture No growth at 2 days    Renal Function Panel [[id number]]  (Abnormal) Collected: 10/03/20 0347    Specimen: Blood Updated: 10/03/20 0500     Glucose 84 mg/dL      BUN 14 mg/dL      Creatinine 0.63 mg/dL      Sodium 139 mmol/L      Potassium 4.0 mmol/L      Chloride 105 mmol/L      CO2 24.2 mmol/L      Calcium 8.6 mg/dL      Albumin 3.00 g/dL      Phosphorus 2.3 mg/dL      Anion Gap 9.8 mmol/L      BUN/Creatinine Ratio 22.2     eGFR Non African Amer 93 mL/min/1.73     Narrative:      GFR Normal >60  Chronic Kidney Disease <60  Kidney Failure <15      Magnesium [[id number]]  (Normal) Collected: 10/03/20 0347    Specimen: Blood Updated: 10/03/20 0500     Magnesium 1.9 mg/dL     C-reactive Protein [[id number]]  (Abnormal) Collected: 10/03/20 0347    Specimen: Blood Updated: 10/03/20 0500     C-Reactive Protein 21.31 mg/dL     CBC & Differential [[id number]]  (Abnormal) Collected: 10/03/20 0347    Specimen: Blood Updated: 10/03/20 0429    Narrative:      The following orders were created for panel order CBC & Differential.  Procedure                               Abnormality          Status                     ---------                               -----------         ------                     CBC Auto Differential[[id number]]        Abnormal            Final result                 Please view results for these tests on the individual orders.    CBC Auto Differential [[id number]]  (Abnormal) Collected: 10/03/20 0347    Specimen: Blood Updated: 10/03/20 0429     WBC 14.07 10*3/mm3      RBC 3.67 10*6/mm3      Hemoglobin 11.7 g/dL      Hematocrit 34.5 %      MCV 94.0 fL      MCH 31.9 pg      MCHC 33.9 g/dL      RDW 12.8 %      RDW-SD 44.4 fl      MPV 11.7 fL      Platelets 220 10*3/mm3      Neutrophil % 73.4 %      Lymphocyte % 15.8 %      Monocyte % 8.7 %      Eosinophil % 1.2 %      Basophil % 0.4 %      Immature Grans % 0.5 %      Neutrophils, Absolute 10.31 10*3/mm3      Lymphocytes, Absolute 2.23 10*3/mm3      Monocytes, Absolute 1.23 10*3/mm3      Eosinophils, Absolute 0.17 10*3/mm3      Basophils, Absolute 0.06 10*3/mm3      Immature Grans, Absolute 0.07 10*3/mm3      nRBC 0.0 /100 WBC            Radiology:    Imaging Results (Last 24 Hours)     Procedure Component Value Units Date/Time    XR Chest 1 View [[id number]] Collected: 10/03/20 1226     Updated: 10/03/20 1330    Narrative:      ONE VIEW PORTABLE CHEST     HISTORY: Increased O2 requirement. Crackles.     FINDINGS: The lungs are well-expanded and clear and the lung bases also  appear clear on the abdominal CT scan performed 2 days ago. The heart is  borderline enlarged.     This report was finalized on 10/3/2020 1:27 PM by Dr. Graham Israel M.D.             Medication Review:     amLODIPine, 5 mg, Oral, Nightly  cefTRIAXone, 2 g, Intravenous, Q24H  enoxaparin, 40 mg, Subcutaneous, Q24H  fluticasone, 2 spray, Nasal, Daily  meloxicam, 15 mg, Oral, Daily  montelukast, 10 mg, Oral, Nightly  oxybutynin XL, 5 mg, Oral, Daily  potassium chloride, 20 mEq, Oral, BID With Meals  sodium chloride, 10 mL, Intravenous, Q12H  venlafaxine XR,  150 mg, Oral, Daily             Assessment/Plan     Problem List Items Addressed This Visit        Genitourinary    Pyelonephritis of left kidney - Primary      Other Visit Diagnoses     Non-intractable vomiting with nausea, unspecified vomiting type              1.  Acute pyelonephritis of the left kidney  2.  UTI, present on admission  3.  Sepsis, present admission, due to #1 and #2.  4.  Essential hypertension  5.  Mild hyperglycemia.  6.  Weakness  7.  Urinary incontinence  8.  Sinus tachycardia    Plan:    Continue with Rocephin.  Will check CT PE protocol.  Also check echocardiogram.  Check lab work in the a.m. if testing is negative, anticipate discharge for tomorrow.  Lovenox for DVT prophylaxis.  Further recommendations will depend on clinical course.    Satnam Mistry DO  10/03/20  16:01 EDT

## 2020-10-03 NOTE — PLAN OF CARE
Goal Outcome Evaluation:  Plan of Care Reviewed With: patient  Progress: improving  Outcome Summary: Pt having less weakness today, still standby assist. Pt having urinary incontinence when standing up, oxybutynin started. Pt showered with staff at bedside. Safety maintained, will continue to monitor.

## 2020-10-04 ENCOUNTER — READMISSION MANAGEMENT (OUTPATIENT)
Dept: CALL CENTER | Facility: HOSPITAL | Age: 70
End: 2020-10-04

## 2020-10-04 ENCOUNTER — APPOINTMENT (OUTPATIENT)
Dept: CARDIOLOGY | Facility: HOSPITAL | Age: 70
End: 2020-10-04

## 2020-10-04 VITALS
WEIGHT: 198.41 LBS | DIASTOLIC BLOOD PRESSURE: 68 MMHG | SYSTOLIC BLOOD PRESSURE: 107 MMHG | BODY MASS INDEX: 35.16 KG/M2 | TEMPERATURE: 98.3 F | OXYGEN SATURATION: 94 % | HEIGHT: 63 IN | RESPIRATION RATE: 18 BRPM | HEART RATE: 93 BPM

## 2020-10-04 LAB
ALBUMIN SERPL-MCNC: 3.2 G/DL (ref 3.5–5.2)
ANION GAP SERPL CALCULATED.3IONS-SCNC: 8.2 MMOL/L (ref 5–15)
AORTIC DIMENSIONLESS INDEX: 0.8 (DI)
BASOPHILS # BLD AUTO: 0.05 10*3/MM3 (ref 0–0.2)
BASOPHILS NFR BLD AUTO: 0.4 % (ref 0–1.5)
BH CV ECHO MEAS - ACS: 1.7 CM
BH CV ECHO MEAS - AO MAX PG (FULL): 4.2 MMHG
BH CV ECHO MEAS - AO MAX PG: 8.1 MMHG
BH CV ECHO MEAS - AO MEAN PG (FULL): 2 MMHG
BH CV ECHO MEAS - AO MEAN PG: 4 MMHG
BH CV ECHO MEAS - AO ROOT AREA (BSA CORRECTED): 1.3
BH CV ECHO MEAS - AO ROOT AREA: 4.9 CM^2
BH CV ECHO MEAS - AO ROOT DIAM: 2.5 CM
BH CV ECHO MEAS - AO V2 MAX: 142 CM/SEC
BH CV ECHO MEAS - AO V2 MEAN: 97.3 CM/SEC
BH CV ECHO MEAS - AO V2 VTI: 26.5 CM
BH CV ECHO MEAS - ASC AORTA: 2.7 CM
BH CV ECHO MEAS - AVA(I,A): 1.9 CM^2
BH CV ECHO MEAS - AVA(I,D): 1.9 CM^2
BH CV ECHO MEAS - AVA(V,A): 1.8 CM^2
BH CV ECHO MEAS - AVA(V,D): 1.8 CM^2
BH CV ECHO MEAS - BSA(HAYCOCK): 2 M^2
BH CV ECHO MEAS - BSA: 1.9 M^2
BH CV ECHO MEAS - BZI_BMI: 35.2 KILOGRAMS/M^2
BH CV ECHO MEAS - BZI_METRIC_HEIGHT: 160 CM
BH CV ECHO MEAS - BZI_METRIC_WEIGHT: 90 KG
BH CV ECHO MEAS - EDV(CUBED): 110.6 ML
BH CV ECHO MEAS - EDV(MOD-SP2): 76 ML
BH CV ECHO MEAS - EDV(MOD-SP4): 51 ML
BH CV ECHO MEAS - EDV(TEICH): 107.5 ML
BH CV ECHO MEAS - EF(CUBED): 61.2 %
BH CV ECHO MEAS - EF(MOD-BP): 65.9 %
BH CV ECHO MEAS - EF(MOD-SP2): 69.7 %
BH CV ECHO MEAS - EF(MOD-SP4): 58.8 %
BH CV ECHO MEAS - EF(TEICH): 52.7 %
BH CV ECHO MEAS - ESV(CUBED): 42.9 ML
BH CV ECHO MEAS - ESV(MOD-SP2): 23 ML
BH CV ECHO MEAS - ESV(MOD-SP4): 21 ML
BH CV ECHO MEAS - ESV(TEICH): 50.9 ML
BH CV ECHO MEAS - FS: 27.1 %
BH CV ECHO MEAS - IVS/LVPW: 0.7
BH CV ECHO MEAS - IVSD: 0.7 CM
BH CV ECHO MEAS - LAT PEAK E' VEL: 12.8 CM/SEC
BH CV ECHO MEAS - LV DIASTOLIC VOL/BSA (35-75): 26.5 ML/M^2
BH CV ECHO MEAS - LV MASS(C)D: 137.1 GRAMS
BH CV ECHO MEAS - LV MASS(C)DI: 71.1 GRAMS/M^2
BH CV ECHO MEAS - LV MAX PG: 3.9 MMHG
BH CV ECHO MEAS - LV MEAN PG: 2 MMHG
BH CV ECHO MEAS - LV SYSTOLIC VOL/BSA (12-30): 10.9 ML/M^2
BH CV ECHO MEAS - LV V1 MAX: 98.3 CM/SEC
BH CV ECHO MEAS - LV V1 MEAN: 63.2 CM/SEC
BH CV ECHO MEAS - LV V1 VTI: 20 CM
BH CV ECHO MEAS - LVIDD: 4.8 CM
BH CV ECHO MEAS - LVIDS: 3.5 CM
BH CV ECHO MEAS - LVLD AP2: 6 CM
BH CV ECHO MEAS - LVLD AP4: 6.7 CM
BH CV ECHO MEAS - LVLS AP2: 5.2 CM
BH CV ECHO MEAS - LVLS AP4: 5.3 CM
BH CV ECHO MEAS - LVOT AREA (M): 2.5 CM^2
BH CV ECHO MEAS - LVOT AREA: 2.5 CM^2
BH CV ECHO MEAS - LVOT DIAM: 1.8 CM
BH CV ECHO MEAS - LVPWD: 1 CM
BH CV ECHO MEAS - MED PEAK E' VEL: 13.5 CM/SEC
BH CV ECHO MEAS - MV A DUR: 108 SEC
BH CV ECHO MEAS - MV A MAX VEL: 63.4 CM/SEC
BH CV ECHO MEAS - MV DEC SLOPE: 537 CM/SEC^2
BH CV ECHO MEAS - MV DEC TIME: 161 SEC
BH CV ECHO MEAS - MV E MAX VEL: 78.8 CM/SEC
BH CV ECHO MEAS - MV E/A: 1.2
BH CV ECHO MEAS - MV MAX PG: 4.4 MMHG
BH CV ECHO MEAS - MV MEAN PG: 1 MMHG
BH CV ECHO MEAS - MV P1/2T MAX VEL: 111 CM/SEC
BH CV ECHO MEAS - MV P1/2T: 60.5 MSEC
BH CV ECHO MEAS - MV V2 MAX: 105 CM/SEC
BH CV ECHO MEAS - MV V2 MEAN: 45.5 CM/SEC
BH CV ECHO MEAS - MV V2 VTI: 27.6 CM
BH CV ECHO MEAS - MVA P1/2T LCG: 2 CM^2
BH CV ECHO MEAS - MVA(P1/2T): 3.6 CM^2
BH CV ECHO MEAS - MVA(VTI): 1.8 CM^2
BH CV ECHO MEAS - PA ACC TIME: 0.11 SEC
BH CV ECHO MEAS - PA MAX PG (FULL): 1.4 MMHG
BH CV ECHO MEAS - PA MAX PG: 2.9 MMHG
BH CV ECHO MEAS - PA PR(ACCEL): 30.4 MMHG
BH CV ECHO MEAS - PA V2 MAX: 84.8 CM/SEC
BH CV ECHO MEAS - PULM A REVS DUR: 0.1 SEC
BH CV ECHO MEAS - PULM A REVS VEL: 24.2 CM/SEC
BH CV ECHO MEAS - PULM DIAS VEL: 47 CM/SEC
BH CV ECHO MEAS - PULM S/D: 1.2
BH CV ECHO MEAS - PULM SYS VEL: 57.3 CM/SEC
BH CV ECHO MEAS - PVA(V,A): 2.7 CM^2
BH CV ECHO MEAS - PVA(V,D): 2.7 CM^2
BH CV ECHO MEAS - QP/QS: 1.1
BH CV ECHO MEAS - RAP SYSTOLE: 8 MMHG
BH CV ECHO MEAS - RV MAX PG: 1.5 MMHG
BH CV ECHO MEAS - RV MEAN PG: 1 MMHG
BH CV ECHO MEAS - RV V1 MAX: 60.7 CM/SEC
BH CV ECHO MEAS - RV V1 MEAN: 44.8 CM/SEC
BH CV ECHO MEAS - RV V1 VTI: 14.3 CM
BH CV ECHO MEAS - RVOT AREA: 3.8 CM^2
BH CV ECHO MEAS - RVOT DIAM: 2.2 CM
BH CV ECHO MEAS - RVSP: 35 MMHG
BH CV ECHO MEAS - SI(AO): 67.5 ML/M^2
BH CV ECHO MEAS - SI(CUBED): 35.1 ML/M^2
BH CV ECHO MEAS - SI(LVOT): 26.4 ML/M^2
BH CV ECHO MEAS - SI(MOD-SP2): 27.5 ML/M^2
BH CV ECHO MEAS - SI(MOD-SP4): 15.6 ML/M^2
BH CV ECHO MEAS - SI(TEICH): 29.4 ML/M^2
BH CV ECHO MEAS - SV(AO): 130.1 ML
BH CV ECHO MEAS - SV(CUBED): 67.7 ML
BH CV ECHO MEAS - SV(LVOT): 50.9 ML
BH CV ECHO MEAS - SV(MOD-SP2): 53 ML
BH CV ECHO MEAS - SV(MOD-SP4): 30 ML
BH CV ECHO MEAS - SV(RVOT): 54.4 ML
BH CV ECHO MEAS - SV(TEICH): 56.7 ML
BH CV ECHO MEAS - TR MAX PG: 27 MMHG
BH CV ECHO MEAS - TR MAX VEL: 259 CM/SEC
BH CV ECHO MEASUREMENTS AVERAGE E/E' RATIO: 5.99
BH CV XLRA - RV BASE: 3.6 CM
BH CV XLRA - RV LENGTH: 6.8 CM
BH CV XLRA - RV MID: 2.5 CM
BH CV XLRA - TDI S': 16.8 CM/SEC
BUN SERPL-MCNC: 11 MG/DL (ref 8–23)
BUN/CREAT SERPL: 22 (ref 7–25)
CALCIUM SPEC-SCNC: 8.5 MG/DL (ref 8.6–10.5)
CHLORIDE SERPL-SCNC: 100 MMOL/L (ref 98–107)
CO2 SERPL-SCNC: 25.8 MMOL/L (ref 22–29)
CREAT SERPL-MCNC: 0.5 MG/DL (ref 0.57–1)
CRP SERPL-MCNC: 18.36 MG/DL (ref 0–0.5)
DEPRECATED RDW RBC AUTO: 44.1 FL (ref 37–54)
EOSINOPHIL # BLD AUTO: 0.29 10*3/MM3 (ref 0–0.4)
EOSINOPHIL NFR BLD AUTO: 2.5 % (ref 0.3–6.2)
ERYTHROCYTE [DISTWIDTH] IN BLOOD BY AUTOMATED COUNT: 13 % (ref 12.3–15.4)
GFR SERPL CREATININE-BSD FRML MDRD: 122 ML/MIN/1.73
GLUCOSE SERPL-MCNC: 91 MG/DL (ref 65–99)
HCT VFR BLD AUTO: 33.7 % (ref 34–46.6)
HGB BLD-MCNC: 11.3 G/DL (ref 12–15.9)
IMM GRANULOCYTES # BLD AUTO: 0.06 10*3/MM3 (ref 0–0.05)
IMM GRANULOCYTES NFR BLD AUTO: 0.5 % (ref 0–0.5)
LEFT ATRIUM VOLUME INDEX: 23.5 ML/M2
LV EF 2D ECHO EST: 65 %
LYMPHOCYTES # BLD AUTO: 2.28 10*3/MM3 (ref 0.7–3.1)
LYMPHOCYTES NFR BLD AUTO: 19.4 % (ref 19.6–45.3)
MAGNESIUM SERPL-MCNC: 2 MG/DL (ref 1.6–2.4)
MAXIMAL PREDICTED HEART RATE: 149 BPM
MCH RBC QN AUTO: 31.1 PG (ref 26.6–33)
MCHC RBC AUTO-ENTMCNC: 33.5 G/DL (ref 31.5–35.7)
MCV RBC AUTO: 92.8 FL (ref 79–97)
MONOCYTES # BLD AUTO: 0.82 10*3/MM3 (ref 0.1–0.9)
MONOCYTES NFR BLD AUTO: 7 % (ref 5–12)
NEUTROPHILS NFR BLD AUTO: 70.2 % (ref 42.7–76)
NEUTROPHILS NFR BLD AUTO: 8.28 10*3/MM3 (ref 1.7–7)
NRBC BLD AUTO-RTO: 0 /100 WBC (ref 0–0.2)
PHOSPHATE SERPL-MCNC: 4.1 MG/DL (ref 2.5–4.5)
PLATELET # BLD AUTO: 233 10*3/MM3 (ref 140–450)
PMV BLD AUTO: 10.9 FL (ref 6–12)
POTASSIUM SERPL-SCNC: 3.8 MMOL/L (ref 3.5–5.2)
RBC # BLD AUTO: 3.63 10*6/MM3 (ref 3.77–5.28)
SODIUM SERPL-SCNC: 134 MMOL/L (ref 136–145)
STRESS TARGET HR: 127 BPM
WBC # BLD AUTO: 11.78 10*3/MM3 (ref 3.4–10.8)

## 2020-10-04 PROCEDURE — 86140 C-REACTIVE PROTEIN: CPT | Performed by: INTERNAL MEDICINE

## 2020-10-04 PROCEDURE — 85025 COMPLETE CBC W/AUTO DIFF WBC: CPT | Performed by: INTERNAL MEDICINE

## 2020-10-04 PROCEDURE — 93306 TTE W/DOPPLER COMPLETE: CPT

## 2020-10-04 PROCEDURE — 80069 RENAL FUNCTION PANEL: CPT | Performed by: INTERNAL MEDICINE

## 2020-10-04 PROCEDURE — 93306 TTE W/DOPPLER COMPLETE: CPT | Performed by: INTERNAL MEDICINE

## 2020-10-04 PROCEDURE — 25010000003 CEFTRIAXONE PER 250 MG: Performed by: INTERNAL MEDICINE

## 2020-10-04 PROCEDURE — 83735 ASSAY OF MAGNESIUM: CPT | Performed by: INTERNAL MEDICINE

## 2020-10-04 PROCEDURE — 25010000002 PERFLUTREN (DEFINITY) 8.476 MG IN SODIUM CHLORIDE 0.9 % 10 ML INJECTION: Performed by: INTERNAL MEDICINE

## 2020-10-04 RX ORDER — L.ACID,PARA/B.BIFIDUM/S.THERM 8B CELL
1 CAPSULE ORAL DAILY
Qty: 10 CAPSULE | Refills: 0 | Status: SHIPPED | OUTPATIENT
Start: 2020-10-04 | End: 2020-10-12

## 2020-10-04 RX ORDER — OXYBUTYNIN CHLORIDE 5 MG/1
5 TABLET, EXTENDED RELEASE ORAL DAILY
Qty: 30 TABLET | Refills: 0 | Status: SHIPPED | OUTPATIENT
Start: 2020-10-05 | End: 2020-10-12

## 2020-10-04 RX ORDER — CEFDINIR 300 MG/1
300 CAPSULE ORAL 2 TIMES DAILY
Qty: 10 CAPSULE | Refills: 0 | Status: SHIPPED | OUTPATIENT
Start: 2020-10-04 | End: 2020-10-12

## 2020-10-04 RX ADMIN — OXYBUTYNIN CHLORIDE 5 MG: 5 TABLET, EXTENDED RELEASE ORAL at 10:46

## 2020-10-04 RX ADMIN — SODIUM CHLORIDE, PRESERVATIVE FREE 10 ML: 5 INJECTION INTRAVENOUS at 10:47

## 2020-10-04 RX ADMIN — FLUTICASONE PROPIONATE 2 SPRAY: 50 SPRAY, METERED NASAL at 10:46

## 2020-10-04 RX ADMIN — MELOXICAM 15 MG: 15 TABLET ORAL at 10:46

## 2020-10-04 RX ADMIN — PERFLUTREN 2 ML: 6.52 INJECTION, SUSPENSION INTRAVENOUS at 09:35

## 2020-10-04 RX ADMIN — VENLAFAXINE HYDROCHLORIDE 150 MG: 150 CAPSULE, EXTENDED RELEASE ORAL at 10:46

## 2020-10-04 RX ADMIN — POTASSIUM CHLORIDE 20 MEQ: 10 CAPSULE, COATED, EXTENDED RELEASE ORAL at 10:46

## 2020-10-04 RX ADMIN — ACETAMINOPHEN 650 MG: 325 TABLET, FILM COATED ORAL at 07:27

## 2020-10-04 NOTE — PLAN OF CARE
Goal Outcome Evaluation:  Plan of Care Reviewed With: patient  Progress: improving  Outcome Summary: Pt CTA negative for PE. Phosphorus replaced. Placed on 2L at night while sleeping to keep sats above 90%. Ambulating to bathroom. No c/o pain.

## 2020-10-04 NOTE — DISCHARGE SUMMARY
[facility] HOSPITALIST   DISCHARGE SUMMARY      Name:  Mary Cueva   Age:  70 y.o.  Sex:  female  :  1950  MRN:  [id number]   Visit Number:  [id number]  Primary Care Physician:  Lamonte Christopher MD  Date of Discharge:  10/4/2020  Admission Date:  10/1/2020      Discharge Diagnosis:     1.  Acute pyelonephritis of the left kidney  2.  UTI, present on admission  3.  Sepsis, present admission, due to #1 and #2.  4.  Essential hypertension  5.  Mild hyperglycemia.  6.  Weakness  7.  Urinary incontinence  8.  Sinus tachycardia    Active Hospital Problems    Diagnosis  POA   • Pyelonephritis of left kidney [N12]  Yes      Resolved Hospital Problems   No resolved problems to display.         Presenting Problem/History of Present Illness:    Non-intractable vomiting with nausea, unspecified vomiting type [R11.2]  Pyelonephritis of left kidney [N12]         Hospital Course:    Patient seen on date of discharge 10/4/2020.    Patient is a 70-year-old female who came to emergency room with weakness for a few days.  She has had intermittent vomiting as well.  Testing in the emergency room showed UTI with symptoms of sepsis.  CT showed pyelonephritis of the left kidney.  She was given IV fluids and admitted.  She was placed on Rocephin.  WBC count is improved.  Culture has been negative.  Will change to Omnicef 300 mg twice daily for 5 more days. Infectious disease has seen the patient and feels the patient is appropriately being treated.    She had urinary incontinence did add Ditropan.     Patient was noted to have some hypoxia as well as tachycardia up to 150 last night.    Work-up included CTA of the chest which was negative.  Echocardiogram showed some right sided changes with pulmonary hypertension.  Nursing noted that the patient has apneic episodes at night.  Suspect she has sleep apnea.  Her  has sleep apnea and follows with , she says she will follow with him as well.  Will  arrange outpatient appointment.     EKG showed sinus tachycardia.  Chest x-ray showed no acute disease.    She is stable for discharge home at this point.  Follow-up with PCP as well as Dr. Dang for evaluation for sleep apnea soon as possible.    Procedures Performed:           Consults:     Consults     Date and Time Order Name Status Description    10/1/2020 1748 Inpatient Infectious Diseases Consult Completed     10/1/2020 1316 [facility] (on-call MD unless specified) Details Completed           Pertinent Test Results:     Lab Results (all)     Procedure Component Value Units Date/Time    Blood Culture - Blood, Arm, Left [[id number]] Collected: 10/01/20 1136    Specimen: Blood from Arm, Left Updated: 10/04/20 1145     Blood Culture No growth at 3 days    Blood Culture - Blood, Arm, Right [[id number]] Collected: 10/01/20 1135    Specimen: Blood from Arm, Right Updated: 10/04/20 1145     Blood Culture No growth at 3 days    Renal Function Panel [[id number]]  (Abnormal) Collected: 10/04/20 0451    Specimen: Blood Updated: 10/04/20 0548     Glucose 91 mg/dL      BUN 11 mg/dL      Creatinine 0.50 mg/dL      Sodium 134 mmol/L      Potassium 3.8 mmol/L      Chloride 100 mmol/L      CO2 25.8 mmol/L      Calcium 8.5 mg/dL      Albumin 3.20 g/dL      Phosphorus 4.1 mg/dL      Anion Gap 8.2 mmol/L      BUN/Creatinine Ratio 22.0     eGFR Non African Amer 122 mL/min/1.73     Narrative:      GFR Normal >60  Chronic Kidney Disease <60  Kidney Failure <15      Magnesium [[id number]]  (Normal) Collected: 10/04/20 0451    Specimen: Blood Updated: 10/04/20 0546     Magnesium 2.0 mg/dL     C-reactive Protein [[id number]]  (Abnormal) Collected: 10/04/20 0451    Specimen: Blood Updated: 10/04/20 0546     C-Reactive Protein 18.36 mg/dL     CBC & Differential [[id number]]  (Abnormal) Collected: 10/04/20 0451    Specimen: Blood Updated: 10/04/20 0512    Narrative:      The following orders were created for panel order CBC & Differential.  Procedure                                Abnormality         Status                     ---------                               -----------         ------                     CBC Auto Differential[[id number]]        Abnormal            Final result                 Please view results for these tests on the individual orders.    CBC Auto Differential [[id number]]  (Abnormal) Collected: 10/04/20 0451    Specimen: Blood Updated: 10/04/20 0512     WBC 11.78 10*3/mm3      RBC 3.63 10*6/mm3      Hemoglobin 11.3 g/dL      Hematocrit 33.7 %      MCV 92.8 fL      MCH 31.1 pg      MCHC 33.5 g/dL      RDW 13.0 %      RDW-SD 44.1 fl      MPV 10.9 fL      Platelets 233 10*3/mm3      Neutrophil % 70.2 %      Lymphocyte % 19.4 %      Monocyte % 7.0 %      Eosinophil % 2.5 %      Basophil % 0.4 %      Immature Grans % 0.5 %      Neutrophils, Absolute 8.28 10*3/mm3      Lymphocytes, Absolute 2.28 10*3/mm3      Monocytes, Absolute 0.82 10*3/mm3      Eosinophils, Absolute 0.29 10*3/mm3      Basophils, Absolute 0.05 10*3/mm3      Immature Grans, Absolute 0.06 10*3/mm3      nRBC 0.0 /100 WBC     Renal Function Panel [[id number]]  (Abnormal) Collected: 10/03/20 0347    Specimen: Blood Updated: 10/03/20 0500     Glucose 84 mg/dL      BUN 14 mg/dL      Creatinine 0.63 mg/dL      Sodium 139 mmol/L      Potassium 4.0 mmol/L      Chloride 105 mmol/L      CO2 24.2 mmol/L      Calcium 8.6 mg/dL      Albumin 3.00 g/dL      Phosphorus 2.3 mg/dL      Anion Gap 9.8 mmol/L      BUN/Creatinine Ratio 22.2     eGFR Non African Amer 93 mL/min/1.73     Narrative:      GFR Normal >60  Chronic Kidney Disease <60  Kidney Failure <15      Magnesium [[id number]]  (Normal) Collected: 10/03/20 0347    Specimen: Blood Updated: 10/03/20 0500     Magnesium 1.9 mg/dL     C-reactive Protein [[id number]]  (Abnormal) Collected: 10/03/20 0347    Specimen: Blood Updated: 10/03/20 0500     C-Reactive Protein 21.31 mg/dL     CBC & Differential [[id number]]  (Abnormal) Collected: 10/03/20  0347    Specimen: Blood Updated: 10/03/20 0429    Narrative:      The following orders were created for panel order CBC & Differential.  Procedure                               Abnormality         Status                     ---------                               -----------         ------                     CBC Auto Differential[[id number]]        Abnormal            Final result                 Please view results for these tests on the individual orders.    CBC Auto Differential [[id number]]  (Abnormal) Collected: 10/03/20 0347    Specimen: Blood Updated: 10/03/20 0429     WBC 14.07 10*3/mm3      RBC 3.67 10*6/mm3      Hemoglobin 11.7 g/dL      Hematocrit 34.5 %      MCV 94.0 fL      MCH 31.9 pg      MCHC 33.9 g/dL      RDW 12.8 %      RDW-SD 44.4 fl      MPV 11.7 fL      Platelets 220 10*3/mm3      Neutrophil % 73.4 %      Lymphocyte % 15.8 %      Monocyte % 8.7 %      Eosinophil % 1.2 %      Basophil % 0.4 %      Immature Grans % 0.5 %      Neutrophils, Absolute 10.31 10*3/mm3      Lymphocytes, Absolute 2.23 10*3/mm3      Monocytes, Absolute 1.23 10*3/mm3      Eosinophils, Absolute 0.17 10*3/mm3      Basophils, Absolute 0.06 10*3/mm3      Immature Grans, Absolute 0.07 10*3/mm3      nRBC 0.0 /100 WBC     COVID PRE-OP / PRE-PROCEDURE SCREENING ORDER (NO ISOLATION) - Swab, Nasopharynx [[id number]] Collected: 10/01/20 1346    Specimen: Swab from Nasopharynx Updated: 10/02/20 1150    Narrative:      The following orders were created for panel order COVID PRE-OP / PRE-PROCEDURE SCREENING ORDER (NO ISOLATION) - Swab, Nasopharynx.  Procedure                               Abnormality         Status                     ---------                               -----------         ------                     COVID-19,BIOTAP, NP/OP S...[[id number]]                      Final result                 Please view results for these tests on the individual orders.    COVID-19,BIOTAP, NP/OP SWAB IN TRANSPORT MEDIA OR SALINE 24-36 HR  TAT - Swab, Nasopharynx [[id number]] Collected: 10/01/20 1346    Specimen: Swab from Nasopharynx Updated: 10/02/20 1150     SARS-CoV-2 PCR Not Detected     Comment: Nucleic acid specific to SARS-CoV-2 (COVID-19) virus was not detected inthis sample by the TaqPath (TM) COVID-19 Combo Kit.SARS-CoV-2 (COVID-19) nucleic acid testing performed using [employer] Aptima (R) SARS-CoV-2 Assay or [employer] TaqPath   (TM)COVID-19 Combo Kit as indicated above under Emergency Use Authorization (EUA) from the FDA. Aptima (R) and TaqPath (TM) test performancecharacteristics verified by [facility] in accordance with the FDAs Guidance Document (Policy for Diagnostic   Tests for Coronavirus Disease-2019during the Public Health Emergency) issued on March 16, 2020. The laboratory is regulated under CLIA as qualified to perform high-complexity testingUnless otherwise noted, all testing was performed at [facility],   CLIA No. [id number], [facility] Licensee No. [id number]       Urine Culture - Urine, Urine, Clean Catch [[id number]] Collected: 10/01/20 1342    Specimen: Urine, Clean Catch Updated: 10/02/20 0850     Urine Culture >100,000 CFU/mL Mixed Jessi Isolated    Narrative:      Specimen contains mixed organisms of questionable pathogenicity which indicates contamination with commensal jessi.  Further identification is unlikely to provide clinically useful information.  Suggest recollection.    Basic Metabolic Panel [[id number]]  (Abnormal) Collected: 10/02/20 0521    Specimen: Blood Updated: 10/02/20 0617     Glucose 100 mg/dL      BUN 14 mg/dL      Creatinine 0.74 mg/dL      Sodium 138 mmol/L      Potassium 3.6 mmol/L      Chloride 101 mmol/L      CO2 28.2 mmol/L      Calcium 8.6 mg/dL      eGFR Non African Amer 77 mL/min/1.73      BUN/Creatinine Ratio 18.9     Anion Gap 8.8 mmol/L     Narrative:      GFR Normal >60  Chronic Kidney Disease <60  Kidney Failure <15      Magnesium [[id number]]  (Normal) Collected: 10/02/20  0521    Specimen: Blood Updated: 10/02/20 0617     Magnesium 2.0 mg/dL     CBC Auto Differential [[id number]]  (Abnormal) Collected: 10/02/20 0521    Specimen: Blood Updated: 10/02/20 0556     WBC 17.96 10*3/mm3      RBC 4.17 10*6/mm3      Hemoglobin 12.8 g/dL      Hematocrit 38.7 %      MCV 92.8 fL      MCH 30.7 pg      MCHC 33.1 g/dL      RDW 12.8 %      RDW-SD 43.0 fl      MPV 10.8 fL      Platelets 258 10*3/mm3      Neutrophil % 74.2 %      Lymphocyte % 14.5 %      Monocyte % 10.1 %      Eosinophil % 0.3 %      Basophil % 0.3 %      Immature Grans % 0.6 %      Neutrophils, Absolute 13.32 10*3/mm3      Lymphocytes, Absolute 2.60 10*3/mm3      Monocytes, Absolute 1.82 10*3/mm3      Eosinophils, Absolute 0.06 10*3/mm3      Basophils, Absolute 0.05 10*3/mm3      Immature Grans, Absolute 0.11 10*3/mm3      nRBC 0.0 /100 WBC     Magnesium [[id number]]  (Normal) Collected: 10/01/20 1048    Specimen: Blood Updated: 10/01/20 1523     Magnesium 1.7 mg/dL     Urinalysis, Microscopic Only - Urine, Clean Catch [[id number]]  (Abnormal) Collected: 10/01/20 1342    Specimen: Urine, Clean Catch Updated: 10/01/20 1445     RBC, UA 13-20 /HPF      WBC, UA 21-30 /HPF      Bacteria, UA 2+ /HPF      Squamous Epithelial Cells, UA 7-12 /HPF      Hyaline Casts, UA 3-6 /LPF      Methodology Manual Light Microscopy    Urinalysis With Culture If Indicated - Urine, Clean Catch [[id number]]  (Abnormal) Collected: 10/01/20 1342    Specimen: Urine, Clean Catch Updated: 10/01/20 1407     Color, UA Yellow     Appearance, UA Cloudy     pH, UA 5.5     Specific Gravity, UA >=1.030     Glucose, UA Negative     Ketones, UA Negative     Bilirubin, UA Negative     Blood, UA Moderate (2+)     Protein,  mg/dL (2+)     Leuk Esterase, UA Moderate (2+)     Nitrite, UA Positive     Urobilinogen, UA 0.2 E.U./dL    Procalcitonin [[id number]]  (Abnormal) Collected: 10/01/20 1048    Specimen: Blood Updated: 10/01/20 1226     Procalcitonin 3.30 ng/mL      "Narrative:      As a Marker for Sepsis (Non-Neonates):   1. <0.5 ng/mL represents a low risk of severe sepsis and/or septic shock.  1. >2 ng/mL represents a high risk of severe sepsis and/or septic shock.    As a Marker for Lower Respiratory Tract Infections that require antibiotic therapy:  PCT on Admission     Antibiotic Therapy             6-12 Hrs later  > 0.5                Strongly Recommended            >0.25 - <0.5         Recommended  0.1 - 0.25           Discouraged                   Remeasure/reassess PCT  <0.1                 Strongly Discouraged          Remeasure/reassess PCT      As 28 day mortality risk marker: \"Change in Procalcitonin Result\" (> 80 % or <=80 %) if Day 0 (or Day 1) and Day 4 values are available. Refer to http://www.[employer]ock[employer] Medicalpct-calculator.com/   Change in PCT <=80 %   A decrease of PCT levels below or equal to 80 % defines a positive change in PCT test result representing a higher risk for 28-day all-cause mortality of patients diagnosed with severe sepsis or septic shock.  Change in PCT > 80 %   A decrease of PCT levels of more than 80 % defines a negative change in PCT result representing a lower risk for 28-day all-cause mortality of patients diagnosed with severe sepsis or septic shock.                Results may be falsely decreased if patient taking Biotin.     Comprehensive Metabolic Panel [[id number]]  (Abnormal) Collected: 10/01/20 1048    Specimen: Blood Updated: 10/01/20 1219     Glucose 143 mg/dL      BUN 16 mg/dL      Creatinine 0.77 mg/dL      Sodium 138 mmol/L      Potassium 3.0 mmol/L      Chloride 98 mmol/L      CO2 26.8 mmol/L      Calcium 9.2 mg/dL      Total Protein 6.3 g/dL      Albumin 3.50 g/dL      ALT (SGPT) 25 U/L      AST (SGOT) 45 U/L      Alkaline Phosphatase 98 U/L      Total Bilirubin 0.6 mg/dL      eGFR Non African Amer 74 mL/min/1.73      Globulin 2.8 gm/dL      A/G Ratio 1.3 g/dL      BUN/Creatinine Ratio 20.8     Anion Gap 13.2 mmol/L     Narrative:   "    GFR Normal >60  Chronic Kidney Disease <60  Kidney Failure <15      [place] Draw [[id number]] Collected: 10/01/20 1048    Specimen: Blood Updated: 10/01/20 1200    Narrative:      The following orders were created for panel order [place] Draw.  Procedure                               Abnormality         Status                     ---------                               -----------         ------                     Light Blue Top[[id number]]                                   Final result               Green Top (Gel)[[id number]]                                  Final result               Lavender Top[[id number]]                                     Final result               Gold Top - SST[[id number]]                                   Final result                 Please view results for these tests on the individual orders.    Light Blue Top [[id number]] Collected: 10/01/20 1048    Specimen: Blood Updated: 10/01/20 1200     Extra Tube hold for add-on     Comment: Auto resulted       Green Top (Gel) [[id number]] Collected: 10/01/20 1048    Specimen: Blood Updated: 10/01/20 1200     Extra Tube Hold for add-ons.     Comment: Auto resulted.       Lavender Top [[id number]] Collected: 10/01/20 1048    Specimen: Blood Updated: 10/01/20 1200     Extra Tube hold for add-on     Comment: Auto resulted       Gold Top - SST [[id number]] Collected: 10/01/20 1048    Specimen: Blood Updated: 10/01/20 1200     Extra Tube Hold for add-ons.     Comment: Auto resulted.       Lactic Acid, Plasma [[id number]]  (Normal) Collected: 10/01/20 1135    Specimen: Blood Updated: 10/01/20 1158     Lactate 1.5 mmol/L     CBC & Differential [[id number]]  (Abnormal) Collected: 10/01/20 1048    Specimen: Blood Updated: 10/01/20 1107    Narrative:      The following orders were created for panel order CBC & Differential.  Procedure                               Abnormality         Status                     ---------                               -----------          ------                     CBC Auto Differential[[id number]]        Abnormal            Final result                 Please view results for these tests on the individual orders.    CBC Auto Differential [[id number]]  (Abnormal) Collected: 10/01/20 1048    Specimen: Blood Updated: 10/01/20 1107     WBC 20.90 10*3/mm3      RBC 4.43 10*6/mm3      Hemoglobin 13.8 g/dL      Hematocrit 40.3 %      MCV 91.0 fL      MCH 31.2 pg      MCHC 34.2 g/dL      RDW 12.7 %      RDW-SD 42.5 fl      MPV 10.4 fL      Platelets 274 10*3/mm3      Neutrophil % 84.8 %      Lymphocyte % 5.2 %      Monocyte % 9.2 %      Eosinophil % 0.0 %      Basophil % 0.3 %      Immature Grans % 0.5 %      Neutrophils, Absolute 17.70 10*3/mm3      Lymphocytes, Absolute 1.09 10*3/mm3      Monocytes, Absolute 1.93 10*3/mm3      Eosinophils, Absolute 0.01 10*3/mm3      Basophils, Absolute 0.06 10*3/mm3      Immature Grans, Absolute 0.11 10*3/mm3      nRBC 0.0 /100 WBC           Imaging Results (All)     Procedure Component Value Units Date/Time    CT Angiogram Chest [[id number]] Collected: 10/03/20 1913     Updated: 10/03/20 1930    Narrative:      CT ANGIOGRAM CHEST-     Radiation dose reduction techniques were utilized, including automated  exposure control and exposure modulation based on body size.     CLINICAL INFORMATION:  Pulmonary embolus suspected, urinary tract  infection.     TECHNIQUE: CT scan of the chest with intravenous contrast, pulmonary  embolus protocol to include CT angio and 3-dimensional reconstruction.     FINDINGS: Mild cardiac enlargement, no pericardial abnormality. Mild  coronary artery calcifications. The esophagus is satisfactory in course  and caliber. Aberrant right subclavian artery, anatomic variant. No  aortic aneurysm or dissection.     Thin axial and CT angio reconstructed images fail to demonstrate  pulmonary embolus. There is symmetric septal thickening demonstrated at  the lung bases suggesting mild vascular  congestion. No pleural effusion  seen. Linear areas of discoid atelectasis versus scarring seen within  the right and left lungs. No lung consolidation. A few mildly enlarged  mediastinal lymph nodes seen with the largest adjacent to the AP window  measuring 14 mm. There are mildly enlarged hilar lymph nodes with the  largest on the right measuring 15 mm. Limited images through the upper  abdomen demonstrate a hepatic cyst.     CONCLUSION:  1. No pulmonary embolus is demonstrated.  2. Septal thickening at the lung bases suggesting mild vascular  congestion however no pleural effusion seen. Mild cardiac enlargement.  3. Linear areas of discoid atelectasis versus scarring within both  lungs.     Findings of this report called to [address] [place] [address] at the time of  completion, 7:05 PM.        This report was finalized on 10/3/2020 7:27 PM by Dr. Eagle Klein M.D.       XR Chest 1 View [[id number]] Collected: 10/03/20 1226     Updated: 10/03/20 1330    Narrative:      ONE VIEW PORTABLE CHEST     HISTORY: Increased O2 requirement. Crackles.     FINDINGS: The lungs are well-expanded and clear and the lung bases also  appear clear on the abdominal CT scan performed 2 days ago. The heart is  borderline enlarged.     This report was finalized on 10/3/2020 1:27 PM by Dr. Graham Israel M.D.       CT Abdomen Pelvis With Contrast [[id number]] Collected: 10/01/20 1319     Updated: 10/02/20 0624    Narrative:      CT ABDOMEN AND PELVIS WITH IV CONTRAST     HISTORY: 70-year-old female with dysuria, low back pain, and  leukocytosis.     TECHNIQUE: Radiation dose reduction techniques were utilized, including  automated exposure control and exposure modulation based on body size.   3 mm images were obtained through the abdomen and pelvis after the  administration of IV contrast. Compared with previous CT from  11/15/2004.     FINDINGS: There is very mild dilatation and thickening of the entire  left ureter and there is a  "slightly striated enhancement pattern of the  left kidney. Urinary bladder is thick-walled, but is partially  collapsed. There are no renal or ureteral stones bilaterally. There is a  1.1 cm left renal cyst, previously 4-5 mm. The liver appears  unremarkable other than several small left hepatic lobe cysts which  appear largely unchanged. The gallbladder is surgically absent. There is  no biliary dilatation. The spleen, pancreas, and adrenals appear  unremarkable. No acute bowel abnormality is seen. There is moderate  sigmoid diverticulosis without evidence for diverticulitis. Uterus and  adnexa appear unremarkable. Two nabothian cysts are noted within the  cervix.       Impression:      There is acute cystitis and left pyelonephritis.     Discussed with PEDRO Mercer.     This report was finalized on 10/2/2020 6:21 AM by Dr. Linda Penn M.D.             Condition on Discharge:      Stable    Vital Signs:    /68   Pulse 93   Temp 98.3 °F (36.8 °C) (Oral)   Resp 18   Ht 160 cm (62.99\")   Wt 90 kg (198 lb 6.6 oz)   SpO2 94%   Breastfeeding No   BMI 35.16 kg/m²     Physical Exam:    General: Alert and oriented x4, well-developed well-nourished, mild distress  Heart: Regular rate and rhythm without murmur rub or thrill  Lungs: Clear to auscultation bilaterally without use of accessory muscles or respiration.  Abdomen: Soft/nontender/nondistended.  No hepatosplenomegaly is noted.  MSK: 5/5 strength in upper/lower extremities bilaterally.    Discharge Disposition:    Home or Self Care    Discharge Medication:       Discharge Medications      New Medications      Instructions Start Date   cefdinir 300 MG capsule  Commonly known as: OMNICEF   300 mg, Oral, 2 Times Daily      lactobacillus acidophilus capsule capsule   1 capsule, Oral, Daily      oxybutynin XL 5 MG 24 hr tablet  Commonly known as: DITROPAN-XL   5 mg, Oral, Daily   Start Date: October 5, 2020        Continue These Medications      Instructions " Start Date   amLODIPine 5 MG tablet  Commonly known as: NORVASC   Take 1 tablet by mouth once daily      fluticasone 50 MCG/ACT nasal spray  Commonly known as: FLONASE   2 sprays, Nasal, Daily      meloxicam 15 MG tablet  Commonly known as: MOBIC   Take 1 tablet by mouth once daily      montelukast 10 MG tablet  Commonly known as: SINGULAIR   10 mg, Oral, Nightly      potassium chloride 8 MEQ CR tablet  Commonly known as: KLOR-CON   8 mEq, Oral, Daily      triamterene-hydrochlorothiazide 75-50 MG per tablet  Commonly known as: MAXZIDE   0.5 tablets, Oral, Daily      venlafaxine  MG 24 hr capsule  Commonly known as: EFFEXOR-XR   150 mg, Oral, Daily             Discharge Diet:     Diet Instructions     Diet: Regular, Consistent Carbohydrate, Cardiac      Discharge Diet:  Regular  Consistent Carbohydrate  Cardiac             Activity at Discharge:     Activity Instructions     Activity as Tolerated            Follow-up Appointments:    Future Appointments   Date Time Provider Department Center   12/4/2020 10:45 AM Lamonte Christopher MD [facility] None     Additional Instructions for the Follow-ups that You Need to Schedule     Discharge Follow-up with PCP   As directed       Currently Documented PCP:    Lamonte Christopher MD    PCP Phone Number:    [phone]     Follow Up Details: 1 week         Discharge Follow-up with Specified Provider: dr yobany johns   As directed      To: dr yobany johns    Follow Up Details: french eval               Test Results Pending at Discharge:    Pending Labs     Order Current Status    Blood Culture - Blood, Arm, Left Preliminary result    Blood Culture - Blood, Arm, Right Preliminary result             Satnam Mistry DO  10/04/20  12:17 EDT

## 2020-10-04 NOTE — OUTREACH NOTE
Prep Survey      Responses   [facility] patient discharged from?  [place]   Is LACE score < 7 ?  Yes   Eligibility  [facility]   Date of Admission  10/01/20   Date of Discharge  10/04/20   Discharge Disposition  Home or Self Care   Discharge diagnosis  Acute pyelonephritis of the left kidney,    UTI,     Sepsis   Does the patient have one of the following disease processes/diagnoses(primary or secondary)?  Sepsis   Does the patient have Home health ordered?  No   Is there a DME ordered?  No   Prep survey completed?  Yes          Marni Gomez RN

## 2020-10-05 ENCOUNTER — TRANSITIONAL CARE MANAGEMENT TELEPHONE ENCOUNTER (OUTPATIENT)
Dept: CALL CENTER | Facility: HOSPITAL | Age: 70
End: 2020-10-05

## 2020-10-05 NOTE — PROGRESS NOTES
Case Management Discharge Note      Final Note: Home    Provided Post Acute Provider List?: Yes  Post Acute Provider List: Home Health, Nursing Home  Provided Post Acute Provider Quality & Resource List?: N/A  N/A Quality & Resource List Comment: Patient denies needs  Delivered To: Patient  Method of Delivery: In person    Selected Continued Care - Discharged on 10/4/2020 Admission date: 10/1/2020 - Discharge disposition: Home or Self Care    Destination    No services have been selected for the patient.              Durable Medical Equipment    No services have been selected for the patient.              Dialysis/Infusion    No services have been selected for the patient.              Home Medical Care    No services have been selected for the patient.              Therapy    No services have been selected for the patient.              Community Resources    No services have been selected for the patient.                  Transportation Services  Private: Car    Final Discharge Disposition Code: 01 - home or self-care

## 2020-10-05 NOTE — OUTREACH NOTE
Call Center TCM Note      Responses   [facility] patient discharged from?  [place]   Does the patient have one of the following disease processes/diagnoses(primary or secondary)?  Sepsis   TCM attempt successful?  No   Unsuccessful attempts  Attempt 2          Leigha Sumner LPN    10/5/2020, 17:55 EDT

## 2020-10-05 NOTE — OUTREACH NOTE
Call Center TCM Note      Responses   [facility] patient discharged from?  [place]   Does the patient have one of the following disease processes/diagnoses(primary or secondary)?  Sepsis   TCM attempt successful?  No   Unsuccessful attempts  Attempt 1          Leigha Sumner LPN    10/5/2020, 13:15 EDT

## 2020-10-06 ENCOUNTER — TRANSITIONAL CARE MANAGEMENT TELEPHONE ENCOUNTER (OUTPATIENT)
Dept: CALL CENTER | Facility: HOSPITAL | Age: 70
End: 2020-10-06

## 2020-10-06 LAB
BACTERIA SPEC AEROBE CULT: NORMAL
BACTERIA SPEC AEROBE CULT: NORMAL

## 2020-10-06 NOTE — OUTREACH NOTE
Call Center TCM Note      Responses   [facility] patient discharged from?  [place]   Does the patient have one of the following disease processes/diagnoses(primary or secondary)?  Sepsis   TCM attempt successful?  No   Unsuccessful attempts  Attempt 3          Mary Cook MA    10/6/2020, 17:00 EDT

## 2020-10-12 ENCOUNTER — OFFICE VISIT (OUTPATIENT)
Dept: INTERNAL MEDICINE | Facility: CLINIC | Age: 70
End: 2020-10-12

## 2020-10-12 VITALS
DIASTOLIC BLOOD PRESSURE: 84 MMHG | OXYGEN SATURATION: 98 % | TEMPERATURE: 98.1 F | HEART RATE: 98 BPM | SYSTOLIC BLOOD PRESSURE: 160 MMHG | BODY MASS INDEX: 35.26 KG/M2 | WEIGHT: 199 LBS | HEIGHT: 63 IN

## 2020-10-12 DIAGNOSIS — E83.42 HYPOMAGNESEMIA: ICD-10-CM

## 2020-10-12 DIAGNOSIS — A41.9 SEPSIS, DUE TO UNSPECIFIED ORGANISM, UNSPECIFIED WHETHER ACUTE ORGAN DYSFUNCTION PRESENT (HCC): ICD-10-CM

## 2020-10-12 DIAGNOSIS — N12 PYELONEPHRITIS OF LEFT KIDNEY: Primary | ICD-10-CM

## 2020-10-12 DIAGNOSIS — E87.6 HYPOKALEMIA: ICD-10-CM

## 2020-10-12 DIAGNOSIS — G47.30 SLEEP APNEA, UNSPECIFIED TYPE: ICD-10-CM

## 2020-10-12 DIAGNOSIS — I10 BENIGN ESSENTIAL HYPERTENSION: ICD-10-CM

## 2020-10-12 PROCEDURE — 99495 TRANSJ CARE MGMT MOD F2F 14D: CPT | Performed by: FAMILY MEDICINE

## 2020-10-12 RX ORDER — TRIAMTERENE AND HYDROCHLOROTHIAZIDE 75; 50 MG/1; MG/1
0.5 TABLET ORAL DAILY
Qty: 45 TABLET | Refills: 3 | Status: SHIPPED | OUTPATIENT
Start: 2020-10-12 | End: 2021-10-07

## 2020-10-12 NOTE — PATIENT INSTRUCTIONS
Pyelonephritis, Adult  Pyelonephritis is an infection that occurs in the kidney. The kidneys are the organs that filter a person's blood and move waste out of the bloodstream and into the urine. Urine passes from the kidneys, through tubes called ureters, and into the bladder. There are two main types of pyelonephritis:  · Infections that come on quickly without any warning (acute pyelonephritis).  · Infections that last for a long period of time (chronic pyelonephritis).  In most cases, the infection clears up with treatment and does not cause further problems. More severe infections or chronic infections can sometimes spread to the bloodstream or lead to other problems with the kidneys.  What are the causes?  This condition is usually caused by:  · Bacteria traveling from the bladder up to the kidney. This may occur after having a bladder infection (cystitis) or urinary tract infection (UTI).  · Bladder infections caused from bacteria traveling from the bloodstream to the kidney.  What increases the risk?  This condition is more likely to develop in:  · Pregnant women.  · Older people.  · People who have any of these conditions:  ? Diabetes.  ? Inflammation of the prostate gland (prostatitis), in males.  ? Kidney stones or bladder stones.  ? Other abnormalities of the kidney or ureter.  ? Cancer.  · People who have a catheter placed in the bladder.  · People who are sexually active.  · Women who use spermicides.  · People who have had a prior UTI.  What are the signs or symptoms?  Symptoms of this condition include:  · Frequent urination.  · Strong or persistent urge to urinate.  · Burning or stinging when urinating.  · Abdominal pain.  · Back pain.  · Pain in the side or flank area.  · Fever or chills.  · Blood in the urine, or dark urine.  · Nausea or vomiting.  How is this diagnosed?  This condition may be diagnosed based on:  · Your medical history and a physical exam.  · Urine tests.  · Blood tests.  You may  also have imaging tests of the kidneys, such as an ultrasound or CT scan.  How is this treated?  Treatment for this condition may depend on the severity of the infection.  · If the infection is mild and is found early, you may be treated with antibiotic medicines taken by mouth (orally). You will need to drink fluids to remain hydrated.  · If the infection is more severe, you may need to stay in the hospital and receive antibiotics given directly into a vein through an IV. You may also need to receive fluids through an IV if you are not able to remain hydrated. After your hospital stay, you may need to take oral antibiotics for a period of time.  Other treatments may be required, depending on the cause of the infection.  Follow these instructions at home:  Medicines  · Take your antibiotic medicine as told by your health care provider. Do not stop taking the antibiotic even if you start to feel better.  · Take over-the-counter and prescription medicines only as told by your health care provider.  General instructions    · Drink enough fluid to keep your urine pale yellow.  · Avoid caffeine, tea, and carbonated beverages. They tend to irritate the bladder.  · Urinate often. Avoid holding in urine for long periods of time.  · Urinate before and after sex.  · After a bowel movement, women should cleanse from front to back. Use each tissue only once.  · Keep all follow-up visits as told by your health care provider. This is important.  Contact a health care provider if:  · Your symptoms do not get better after 2 days of treatment.  · Your symptoms get worse.  · You have a fever.  Get help right away if you:  · Are unable to take your antibiotics or fluids.  · Have shaking chills.  · Vomit.  · Have severe flank or back pain.  · Have extreme weakness or fainting.  Summary  · Pyelonephritis is a urinary tract infection (UTI) that occurs in the kidney.  · Treatment for this condition may depend on the severity of the  infection.  · Take your antibiotic medicine as told by your health care provider. Do not stop taking the antibiotic even if you start to feel better.  · Drink enough fluid to keep your urine pale yellow.  · Keep all follow-up visits as told by your health care provider. This is important.  This information is not intended to replace advice given to you by your health care provider. Make sure you discuss any questions you have with your health care provider.  Document Released: 12/18/2006 Document Revised: 10/22/2019 Document Reviewed: 10/22/2019  Elsevier Patient Education © 2020 [employer]

## 2020-10-12 NOTE — PROGRESS NOTES
Subjective   Mary Cueva is a 70 y.o. female. [facility].  [facility].  Discharge 10/4/2020 for pyelonephritis    History of Present Illness   New patient to me    Acute pyelonephritis of the left and sepsis -patient was admitted to [facility] from October 1, 2020 to October 4, 2020.  I personally have reviewed the discharge summary, imaging, labs, and reviewed the medications with the patient.  She was treated for acute pyelonephritis of the left kidney, UTI, sepsis as well as electrolyte issues such as hypokalemia, hypomagnesia, and urinary continence.  She has completed her treatment of Omnicef as an outpatient.  When she went to the hospital they determined that she had a pyelonephritis of the left kidney and had her started on Rocephin which was transitioned to Omnicef.  On October 4 they did a lab to check on her kidney function and it was improving.  They sent her home with some Ditropan as she was having some urinary incontinence but she said that is been improving and is going to stop the Ditropan which I agree with.  Denies any more fevers, chills, back pain, or abnormalities with urine.  Here today for follow-up labs.  Lastly they mention to her in the hospital that she needs a sleep apnea test because she had some episodes of apnea in the hospital.    The following portions of the patient's history were reviewed and updated as appropriate: allergies, current medications, past family history, past medical history, past social history, past surgical history and problem list.    Review of Systems   Constitutional: Negative for activity change, appetite change, fatigue and fever.   HENT: Negative for ear pain, facial swelling and sore throat.    Eyes: Negative for discharge and itching.   Respiratory: Negative for cough, chest tightness and shortness of breath.    Cardiovascular: Negative for chest pain and palpitations.   Gastrointestinal: Negative for abdominal distention and  constipation.   Endocrine: Negative for polydipsia, polyphagia and polyuria.   Genitourinary: Negative for decreased urine volume, difficulty urinating, dysuria, flank pain, hematuria and urgency.   Musculoskeletal: Negative for arthralgias and back pain.   Skin: Negative for color change, rash and wound.   Allergic/Immunologic: Negative for environmental allergies and food allergies.   Neurological: Negative for weakness and numbness.   Hematological: Negative for adenopathy. Does not bruise/bleed easily.   Psychiatric/Behavioral: Negative for decreased concentration and dysphoric mood. The patient is not nervous/anxious.        Objective   Physical Exam  Vitals signs and nursing note reviewed.   Constitutional:       General: She is not in acute distress.     Appearance: Normal appearance. She is well-developed. She is not diaphoretic.   Cardiovascular:      Rate and Rhythm: Normal rate and regular rhythm.      Heart sounds: Normal heart sounds. No murmur. No friction rub. No gallop.    Pulmonary:      Effort: Pulmonary effort is normal.      Breath sounds: Normal breath sounds. No wheezing or rales.   Abdominal:      General: Bowel sounds are normal.      Palpations: Abdomen is soft.   Skin:     General: Skin is warm.      Capillary Refill: Capillary refill takes less than 2 seconds.   Neurological:      Mental Status: She is alert.         Assessment/Plan    was seen today for transitional care management.    Diagnoses and all orders for this visit:    Pyelonephritis of left kidney  -     CBC (No Diff)  -     Comprehensive Metabolic Panel  -     Urinalysis With Culture If Indicated - Urine, Clean Catch    Sepsis, due to unspecified organism, unspecified whether acute organ dysfunction present (CMS/[facility])  -     CBC (No Diff)  -     Comprehensive Metabolic Panel  -     Urinalysis With Culture If Indicated - Urine, Clean Catch    Sleep apnea, unspecified type  -     Ambulatory Referral to Sleep  Medicine    Hypomagnesemia  -     Magnesium    Hypokalemia  -     Comprehensive Metabolic Panel    Benign essential hypertension  -     triamterene-hydrochlorothiazide (MAXZIDE) 75-50 MG per tablet; Take 0.5 tablets by mouth Daily.      The Pyelo and sepsis appears to be treated and I will help confirm that today with some lab work.  I have sent the referral to sleep medicine.  The lab work should help look into her hypokalemia hypomagnesmia as well to make sure she does not need any further treatment with that.  Also refilled her blood pressure medication which she did not take today prior to her visit which was the reason her blood pressure little high.  She has a follow-up appointment scheduled with me on December 4 and we will see each other again.

## 2020-10-13 LAB
ALBUMIN SERPL-MCNC: 4.4 G/DL (ref 3.7–4.7)
ALBUMIN/GLOB SERPL: 1.4 {RATIO} (ref 1.2–2.2)
ALP SERPL-CCNC: 110 IU/L (ref 39–117)
ALT SERPL-CCNC: 23 IU/L (ref 0–32)
APPEARANCE UR: CLEAR
AST SERPL-CCNC: 18 IU/L (ref 0–40)
BACTERIA #/AREA URNS HPF: ABNORMAL /[HPF]
BILIRUB SERPL-MCNC: 0.3 MG/DL (ref 0–1.2)
BILIRUB UR QL STRIP: NEGATIVE
BUN SERPL-MCNC: 20 MG/DL (ref 8–27)
BUN/CREAT SERPL: 22 (ref 12–28)
CALCIUM SERPL-MCNC: 9.5 MG/DL (ref 8.7–10.3)
CASTS URNS MICRO: ABNORMAL
CASTS URNS QL MICRO: PRESENT /LPF
CHLORIDE SERPL-SCNC: 100 MMOL/L (ref 96–106)
CO2 SERPL-SCNC: 27 MMOL/L (ref 20–29)
COLOR UR: YELLOW
CREAT SERPL-MCNC: 0.9 MG/DL (ref 0.57–1)
EPI CELLS #/AREA URNS HPF: ABNORMAL /HPF (ref 0–10)
ERYTHROCYTE [DISTWIDTH] IN BLOOD BY AUTOMATED COUNT: 13.1 % (ref 11.7–15.4)
GLOBULIN SER CALC-MCNC: 3.1 G/DL (ref 1.5–4.5)
GLUCOSE SERPL-MCNC: 98 MG/DL (ref 65–99)
GLUCOSE UR QL: NEGATIVE
HCT VFR BLD AUTO: 46 % (ref 34–46.6)
HGB BLD-MCNC: 15.3 G/DL (ref 11.1–15.9)
HGB UR QL STRIP: NEGATIVE
KETONES UR QL STRIP: NEGATIVE
LEUKOCYTE ESTERASE UR QL STRIP: NEGATIVE
MAGNESIUM SERPL-MCNC: 2.2 MG/DL (ref 1.6–2.3)
MCH RBC QN AUTO: 29.9 PG (ref 26.6–33)
MCHC RBC AUTO-ENTMCNC: 33.3 G/DL (ref 31.5–35.7)
MCV RBC AUTO: 90 FL (ref 79–97)
MICRO URNS: NORMAL
MICRO URNS: NORMAL
MUCOUS THREADS URNS QL MICRO: PRESENT
NITRITE UR QL STRIP: NEGATIVE
PH UR STRIP: 5 [PH] (ref 5–7.5)
PLATELET # BLD AUTO: 514 X10E3/UL (ref 150–450)
POTASSIUM SERPL-SCNC: 4.2 MMOL/L (ref 3.5–5.2)
PROT SERPL-MCNC: 7.5 G/DL (ref 6–8.5)
PROT UR QL STRIP: NEGATIVE
RBC # BLD AUTO: 5.12 X10E6/UL (ref 3.77–5.28)
RBC #/AREA URNS HPF: ABNORMAL /HPF (ref 0–2)
SODIUM SERPL-SCNC: 140 MMOL/L (ref 134–144)
SP GR UR: 1.02 (ref 1–1.03)
URINALYSIS REFLEX: NORMAL
UROBILINOGEN UR STRIP-MCNC: 0.2 MG/DL (ref 0.2–1)
WBC # BLD AUTO: 11.5 X10E3/UL (ref 3.4–10.8)
WBC #/AREA URNS HPF: ABNORMAL /HPF (ref 0–5)

## 2020-11-25 ENCOUNTER — TRANSCRIBE ORDERS (OUTPATIENT)
Dept: SLEEP MEDICINE | Facility: HOSPITAL | Age: 70
End: 2020-11-25

## 2020-11-25 DIAGNOSIS — Z01.818 OTHER SPECIFIED PRE-OPERATIVE EXAMINATION: Primary | ICD-10-CM

## 2020-12-01 ENCOUNTER — TRANSCRIBE ORDERS (OUTPATIENT)
Dept: SLEEP MEDICINE | Facility: HOSPITAL | Age: 70
End: 2020-12-01

## 2020-12-01 DIAGNOSIS — G47.33 OBSTRUCTIVE SLEEP APNEA, ADULT: Primary | ICD-10-CM

## 2020-12-01 DIAGNOSIS — R09.02 HYPOXEMIA: ICD-10-CM

## 2020-12-01 DIAGNOSIS — G47.63 SLEEP-RELATED BRUXISM: ICD-10-CM

## 2020-12-04 ENCOUNTER — OFFICE VISIT (OUTPATIENT)
Dept: INTERNAL MEDICINE | Facility: CLINIC | Age: 70
End: 2020-12-04

## 2020-12-04 VITALS
SYSTOLIC BLOOD PRESSURE: 136 MMHG | BODY MASS INDEX: 35.26 KG/M2 | HEIGHT: 63 IN | HEART RATE: 98 BPM | DIASTOLIC BLOOD PRESSURE: 82 MMHG | WEIGHT: 199 LBS | TEMPERATURE: 97.7 F | OXYGEN SATURATION: 95 %

## 2020-12-04 DIAGNOSIS — F41.8 ANXIETY ASSOCIATED WITH DEPRESSION: ICD-10-CM

## 2020-12-04 DIAGNOSIS — J30.9 ALLERGIC SINUSITIS: ICD-10-CM

## 2020-12-04 DIAGNOSIS — J01.01 ACUTE RECURRENT MAXILLARY SINUSITIS: ICD-10-CM

## 2020-12-04 DIAGNOSIS — E87.6 HYPOKALEMIA: ICD-10-CM

## 2020-12-04 DIAGNOSIS — J30.1 NON-SEASONAL ALLERGIC RHINITIS DUE TO POLLEN: ICD-10-CM

## 2020-12-04 PROCEDURE — 99214 OFFICE O/P EST MOD 30 MIN: CPT | Performed by: FAMILY MEDICINE

## 2020-12-04 RX ORDER — MONTELUKAST SODIUM 10 MG/1
10 TABLET ORAL NIGHTLY
Qty: 90 TABLET | Refills: 3 | Status: SHIPPED | OUTPATIENT
Start: 2020-12-04 | End: 2022-01-10 | Stop reason: SDUPTHER

## 2020-12-04 RX ORDER — POTASSIUM CHLORIDE 600 MG/1
8 TABLET, FILM COATED, EXTENDED RELEASE ORAL DAILY
Qty: 90 TABLET | Refills: 3 | Status: SHIPPED | OUTPATIENT
Start: 2020-12-04 | End: 2021-06-22

## 2020-12-04 RX ORDER — FLUTICASONE PROPIONATE 50 MCG
2 SPRAY, SUSPENSION (ML) NASAL DAILY
Qty: 3 BOTTLE | Refills: 3 | Status: SHIPPED | OUTPATIENT
Start: 2020-12-04 | End: 2022-05-11

## 2020-12-04 RX ORDER — VENLAFAXINE HYDROCHLORIDE 150 MG/1
150 CAPSULE, EXTENDED RELEASE ORAL DAILY
Qty: 90 CAPSULE | Refills: 3 | Status: SHIPPED | OUTPATIENT
Start: 2020-12-04 | End: 2022-01-10 | Stop reason: SDUPTHER

## 2020-12-04 NOTE — PROGRESS NOTES
Subjective   Mary Cueva is a 70 y.o. female. Allergic rhinitis, anxiety, and potassium deficiency    History of Present Illness     Patient here to get refills of her allergic rhinitis meds, potassium, anxiety medication.  Takes the Flonase, Singulair, potassium, levothyroxine as prescribed.  She denies any side effects of these medications.  Anxiety disorder is stable.   Denies depression, poor concentration, poor sleep, fatigue, irritability.  Also denies any sinus or rhinitis symptoms currently due to the medication montelukast being so helpful.      The following portions of the patient's history were reviewed and updated as appropriate: allergies, current medications, past family history, past medical history, past social history, past surgical history and problem list.    Review of Systems   Constitutional: Negative for activity change, appetite change, fatigue and fever.   HENT: Negative for ear pain, facial swelling and sore throat.    Eyes: Negative for discharge and itching.   Respiratory: Negative for cough, chest tightness and shortness of breath.    Cardiovascular: Negative for chest pain and palpitations.   Gastrointestinal: Negative for abdominal distention and constipation.   Endocrine: Negative for polydipsia, polyphagia and polyuria.   Genitourinary: Negative for difficulty urinating and flank pain.   Musculoskeletal: Negative for arthralgias, back pain and neck pain.   Skin: Negative for color change, rash and wound.   Allergic/Immunologic: Negative for environmental allergies and food allergies.   Neurological: Negative for weakness, numbness and headaches.   Hematological: Negative for adenopathy. Does not bruise/bleed easily.   Psychiatric/Behavioral: Negative for decreased concentration and dysphoric mood. The patient is not nervous/anxious.        Objective   Physical Exam  Vitals signs and nursing note reviewed.   Constitutional:       General: She is not in acute distress.      Appearance: Normal appearance. She is well-developed. She is not diaphoretic.   HENT:      Right Ear: Hearing, tympanic membrane, ear canal and external ear normal.      Left Ear: Hearing, tympanic membrane, ear canal and external ear normal.      Nose: Nose normal.      Mouth/Throat:      Mouth: Mucous membranes are moist.      Pharynx: Oropharynx is clear.   Cardiovascular:      Rate and Rhythm: Normal rate and regular rhythm.      Heart sounds: Normal heart sounds. No murmur. No friction rub. No gallop.    Pulmonary:      Effort: Pulmonary effort is normal.      Breath sounds: Normal breath sounds. No wheezing or rales.   Abdominal:      General: Bowel sounds are normal.      Palpations: Abdomen is soft.   Skin:     General: Skin is warm.      Capillary Refill: Capillary refill takes less than 2 seconds.   Neurological:      Mental Status: She is alert.   Psychiatric:         Attention and Perception: Attention and perception normal.         Mood and Affect: Mood and affect normal.         Speech: Speech normal.         Behavior: Behavior normal. Behavior is cooperative.         Thought Content: Thought content normal.         Cognition and Memory: Cognition normal.         Judgment: Judgment normal.         Assessment/Plan   Diagnoses and all orders for this visit:    1. Anxiety associated with depression  -     venlafaxine XR (EFFEXOR-XR) 150 MG 24 hr capsule; Take 1 capsule by mouth Daily.  Dispense: 90 capsule; Refill: 3    2. Hypokalemia  -     potassium chloride (KLOR-CON) 8 MEQ CR tablet; Take 1 tablet by mouth Daily.  Dispense: 90 tablet; Refill: 3    3. Acute recurrent maxillary sinusitis  -     montelukast (SINGULAIR) 10 MG tablet; Take 1 tablet by mouth Every Night.  Dispense: 90 tablet; Refill: 3    4. Non-seasonal allergic rhinitis due to pollen  -     fluticasone (FLONASE) 50 MCG/ACT nasal spray; 2 sprays into the nostril(s) as directed by provider Daily.  Dispense: 3 bottle; Refill: 3    5.  Allergic sinusitis  -     fluticasone (FLONASE) 50 MCG/ACT nasal spray; 2 sprays into the nostril(s) as directed by provider Daily.  Dispense: 3 bottle; Refill: 3      Refilled all medications as above.  Anxiety, allergy, and potassium has been stable.  See back in 6 months for AWV.

## 2020-12-04 NOTE — PATIENT INSTRUCTIONS

## 2020-12-05 ENCOUNTER — LAB (OUTPATIENT)
Dept: LAB | Facility: HOSPITAL | Age: 70
End: 2020-12-05

## 2020-12-05 DIAGNOSIS — Z01.818 OTHER SPECIFIED PRE-OPERATIVE EXAMINATION: ICD-10-CM

## 2020-12-05 PROCEDURE — C9803 HOPD COVID-19 SPEC COLLECT: HCPCS

## 2020-12-05 PROCEDURE — U0004 COV-19 TEST NON-CDC HGH THRU: HCPCS

## 2020-12-07 LAB — SARS-COV-2 RNA RESP QL NAA+PROBE: NOT DETECTED

## 2020-12-08 ENCOUNTER — HOSPITAL ENCOUNTER (OUTPATIENT)
Dept: SLEEP MEDICINE | Facility: HOSPITAL | Age: 70
Discharge: HOME | End: 2020-12-08
Admitting: INTERNAL MEDICINE

## 2020-12-08 DIAGNOSIS — G47.33 OBSTRUCTIVE SLEEP APNEA, ADULT: ICD-10-CM

## 2020-12-08 DIAGNOSIS — G47.63 SLEEP-RELATED BRUXISM: ICD-10-CM

## 2020-12-08 DIAGNOSIS — R09.02 HYPOXEMIA: ICD-10-CM

## 2020-12-08 PROCEDURE — 95810 POLYSOM 6/> YRS 4/> PARAM: CPT

## 2020-12-09 NOTE — PATIENT INSTRUCTIONS
(blank) #[id number] [facility] and [address] [place] [address]    Scanned into media and given to Dr. Brittany Pickett Annual wellness visit with preventive exam as well as age and risk appropriate councelling completed.    Cardiovascular disease councelling: current. Recommended: Excellent blood sugars control., Excellent cholesterol.  Diabetes screening and councelling: current. Recommended: continue Weight Watchers diet  Breast cancer screening: up to date. Recommended frequency and time of the next screening per GYN..  Osteoporosis screening: up to date. Recommended frequency every 2 years. Next DEXA is due in 2019..  Glaucoma screening: up to date.   AAA screening: not needed..  Hep C screening (born between [birth year]78) completed..  Colorectal cancer screening: up to date. Recommended every 10 years. Next screening is due in 2026..    Immunizations:   1. Influenza vaccine  is up to date and recommended yearly.   2. Pneumococcal vaccines: completed.   3. Zostavax: completed.      Advanced directives planning: not completed. The purpose and whole concept of Living Will explained. I had encouraged patient to discuss the issue with family and document personal wishes and preferences in a form of Living Will..    Medications reviewed and medication list updated.   Potential harmful drug-disease interactions in the elderly:none.  High risk medication in elderly: none  ASA use: no indications.    HTN - well controlled  with current medication. Reminded of the importance of low salt diet. Normal kidney tests and electrolytes. Continue same treatment.  Allergic rhinitis - well controlled with Xyzal, will continue same.  Insomnia - practically resolved, no need for medical sleep aids. Will discopntinue Doxep[in.  Leukocytosis - will repeat CBC.  Microscopic hematuria - under the care of romero with negative w/u.     Return in about 6 months (around 8/27/2018) for HTN.

## 2021-04-23 DIAGNOSIS — I10 BENIGN ESSENTIAL HYPERTENSION: ICD-10-CM

## 2021-04-23 RX ORDER — AMLODIPINE BESYLATE 5 MG/1
5 TABLET ORAL DAILY
Qty: 90 TABLET | Refills: 3 | Status: SHIPPED | OUTPATIENT
Start: 2021-04-23 | End: 2022-01-10 | Stop reason: SDUPTHER

## 2021-04-23 NOTE — TELEPHONE ENCOUNTER
Caller: Mary Cueva    Relationship: Self    Best call back number: [phone]  Medication needed:   Requested Prescriptions     Pending Prescriptions Disp Refills   • amLODIPine (NORVASC) 5 MG tablet 90 tablet 3     Sig: Take 1 tablet by mouth Daily.       When do you need the refill by: 04/30    What additional details did the patient provide when requesting the medication: PATIENT HAS  ABOUT 10 DAYS LEFT, AND HAS AN APT COMING UP  06/21.                                                                                                                                  Does the patient have less than a 3 day supply:  [] Yes  [x] No    What is the patient's preferred pharmacy: [address] [phone] [facility] [phone]

## 2021-06-20 NOTE — PROGRESS NOTES
Your labThe ABCs of the Annual Wellness Visit  Subsequent Medicare Wellness Visit    Chief Complaint   Patient presents with   • Medicare Wellness-subsequent       Subjective   History of Present Illness:  Mary Cueva is a 71 y.o. female who presents for a Subsequent Medicare Wellness Visit.    HEALTH RISK ASSESSMENT    Recent Hospitalizations:  No hospitalization(s) within the last year.     Not taken potassium in over a week.    Current Medical Providers:  Patient Care Team:  Lamonte Christopher MD as PCP - General (Family Medicine)  Roxy Knight MD as Referring Physician (Internal Medicine)    Smoking Status:  Social History     Tobacco Use   Smoking Status Never Smoker   Smokeless Tobacco Never Used       Alcohol Consumption:  Social History     Substance and Sexual Activity   Alcohol Use Yes   • Alcohol/week: 1.0 standard drinks   • Types: 1 Glasses of wine per week    Comment: 1 drink 3 times a week        Depression Screen:   PHQ-2/PHQ-9 Depression Screening 6/21/2021   Little interest or pleasure in doing things 0   Feeling down, depressed, or hopeless 0   Trouble falling or staying asleep, or sleeping too much 0   Feeling tired or having little energy 0   Poor appetite or overeating 0   Feeling bad about yourself - or that you are a failure or have let yourself or your family down 0   Trouble concentrating on things, such as reading the newspaper or watching television 0   Moving or speaking so slowly that other people could have noticed. Or the opposite - being so fidgety or restless that you have been moving around a lot more than usual 0   Thoughts that you would be better off dead, or of hurting yourself in some way 0   Total Score 0   If you checked off any problems, how difficult have these problems made it for you to do your work, take care of things at home, or get along with other people? -       Fall Risk Screen:  [facility] Fall Risk Assessment was completed, and patient is at LOW risk for  falls.Assessment completed on:6/21/2021    Health Habits and Functional and Cognitive Screening:  Functional & Cognitive Status 6/21/2021   Do you have difficulty preparing food and eating? No   Do you have difficulty bathing yourself, getting dressed or grooming yourself? No   Do you have difficulty using the toilet? No   Do you have difficulty moving around from place to place? No   Do you have trouble with steps or getting out of a bed or a chair? No   Do you need help using the phone?  No   Are you deaf or do you have serious difficulty hearing?  No   Do you need help with transportation? No   Do you need help shopping? No   Do you need help preparing meals?  No   Do you need help with housework?  No   Do you need help with laundry? No   Do you need help taking your medications? No   Do you need help managing money? No   Do you ever drive or ride in a car without wearing a seat belt? No   Have you felt unusual stress, anger or loneliness in the last month? No   Who do you live with? Spouse   If you need help, do you have trouble finding someone available to you? No   Have you been bothered in the last four weeks by sexual problems? No   Do you have difficulty concentrating, remembering or making decisions? No         Does the patient have evidence of cognitive impairment? No    Asprin use counseling:Does not need ASA (and currently is not on it)    Age-appropriate Screening Schedule:  Refer to the list below for future screening recommendations based on patient's age, sex and/or medical conditions. Orders for these recommended tests are listed in the plan section. The patient has been provided with a written plan.    Health Maintenance   Topic Date Due   • ZOSTER VACCINE (2 of 2) 09/26/2014   • INFLUENZA VACCINE  08/01/2021   • MAMMOGRAM  08/03/2022   • DXA SCAN  10/23/2022   • TDAP/TD VACCINES (2 - Td or Tdap) 11/06/2023          The following portions of the patient's history were reviewed and updated as  appropriate: allergies, current medications, past family history, past medical history, past social history, past surgical history and problem list.    Outpatient Medications Prior to Visit   Medication Sig Dispense Refill   • amLODIPine (NORVASC) 5 MG tablet Take 1 tablet by mouth Daily. 90 tablet 3   • fluticasone (FLONASE) 50 MCG/ACT nasal spray 2 sprays into the nostril(s) as directed by provider Daily. 3 bottle 3   • montelukast (SINGULAIR) 10 MG tablet Take 1 tablet by mouth Every Night. 90 tablet 3   • potassium chloride (KLOR-CON) 8 MEQ CR tablet Take 1 tablet by mouth Daily. 90 tablet 3   • triamterene-hydrochlorothiazide (MAXZIDE) 75-50 MG per tablet Take 0.5 tablets by mouth Daily. 45 tablet 3   • venlafaxine XR (EFFEXOR-XR) 150 MG 24 hr capsule Take 1 capsule by mouth Daily. 90 capsule 3   • meloxicam (MOBIC) 15 MG tablet Take 1 tablet by mouth once daily 90 tablet 3     No facility-administered medications prior to visit.       Patient Active Problem List   Diagnosis   • Borderline glaucoma with anatomical narrow angle   • Osteopenia   • Vitamin D deficiency   • Adiposity   • Atopic rhinitis   • Anxiety disorder   • Benign essential hypertension   • Health care maintenance   • Allergic sinusitis   • Insomnia secondary to anxiety   • Neutrophilic leukocytosis   • Upper back pain, chronic   • Pyelonephritis of left kidney       Advanced Care Planning:  ACP discussion was held with the patient during this visit. Patient has an advance directive (not in EMR), copy requested.    Review of Systems   Constitutional: Negative for activity change, appetite change, fatigue and fever.   HENT: Negative for ear pain, facial swelling and sore throat.    Eyes: Negative for discharge and itching.   Respiratory: Negative for cough, chest tightness and shortness of breath.    Cardiovascular: Negative for chest pain and palpitations.   Gastrointestinal: Negative for abdominal distention, constipation and diarrhea.  "  Endocrine: Negative for polydipsia, polyphagia and polyuria.   Genitourinary: Negative for difficulty urinating and flank pain.   Musculoskeletal: Negative for arthralgias and back pain.   Skin: Negative for color change and rash.   Allergic/Immunologic: Negative for environmental allergies and food allergies.   Neurological: Negative for weakness and numbness.   Hematological: Negative for adenopathy. Does not bruise/bleed easily.   Psychiatric/Behavioral: Negative for decreased concentration and dysphoric mood. The patient is not nervous/anxious.        Compared to one year ago, the patient feels her physical health is the same.  Compared to one year ago, the patient feels her mental health is worse.   has been having some episodes of delusions and mental status changes.    Reviewed chart for potential of high risk medication in the elderly: yes  Reviewed chart for potential of harmful drug interactions in the elderly:yes    Objective         Vitals:    06/21/21 1042   BP: 130/80   BP Location: Left arm   Patient Position: Sitting   Cuff Size: Adult   Pulse: 101   Resp: 14   Temp: 98.4 °F (36.9 °C)   SpO2: 97%   Weight: 90.3 kg (199 lb)   Height: 160 cm (63\")   PainSc:   3       Body mass index is 35.25 kg/m².  Discussed the patient's BMI with her. The BMI is above average; BMI management plan is completed.    Physical Exam  Vitals and nursing note reviewed.   Constitutional:       General: She is not in acute distress.     Appearance: Normal appearance.   Cardiovascular:      Rate and Rhythm: Normal rate and regular rhythm.      Heart sounds: Normal heart sounds. No murmur heard.     Pulmonary:      Effort: Pulmonary effort is normal.      Breath sounds: Normal breath sounds.   Neurological:      Mental Status: She is alert.               Assessment/Plan   Medicare Risks and Personalized Health Plan  CMS Preventative Services Quick Reference  Advance Directive Discussion  Breast Cancer/Mammogram " Screening  Cardiovascular risk  Colon Cancer Screening  Dementia/Memory   Depression/Dysphoria  Diabetic Lab Screening   Fall Risk  Osteoporosis Risk    The above risks/problems have been discussed with the patient.  Pertinent information has been shared with the patient in the After Visit Summary.  Follow up plans and orders are seen below in the Assessment/Plan Section.    Diagnoses and all orders for this visit:    1. Medicare annual wellness visit, subsequent (Primary)  -     CBC (No Diff)  -     Comprehensive Metabolic Panel  -     Lipid Panel    2. Benign essential hypertension  -     CBC (No Diff)  -     Comprehensive Metabolic Panel    3. Dyslipidemia  -     CBC (No Diff)  -     Comprehensive Metabolic Panel  -     Lipid Panel      Routine labs as above and Medicare visit completed.  If the labs come back and the potassium is normal, I may have her discontinue the potassium altogether.  She may not need it any longer.    Follow Up:  Return in about 6 months (around 12/21/2021) for Recheck - HTN, anxiety.     An After Visit Summary and PPPS were given to the patient.

## 2021-06-21 ENCOUNTER — OFFICE VISIT (OUTPATIENT)
Dept: INTERNAL MEDICINE | Facility: CLINIC | Age: 71
End: 2021-06-21

## 2021-06-21 VITALS
SYSTOLIC BLOOD PRESSURE: 130 MMHG | BODY MASS INDEX: 35.26 KG/M2 | WEIGHT: 199 LBS | TEMPERATURE: 98.4 F | HEART RATE: 101 BPM | HEIGHT: 63 IN | DIASTOLIC BLOOD PRESSURE: 80 MMHG | OXYGEN SATURATION: 97 % | RESPIRATION RATE: 14 BRPM

## 2021-06-21 DIAGNOSIS — I10 BENIGN ESSENTIAL HYPERTENSION: ICD-10-CM

## 2021-06-21 DIAGNOSIS — E78.5 DYSLIPIDEMIA: ICD-10-CM

## 2021-06-21 DIAGNOSIS — Z00.00 MEDICARE ANNUAL WELLNESS VISIT, SUBSEQUENT: Primary | ICD-10-CM

## 2021-06-21 PROBLEM — F41.9 INSOMNIA SECONDARY TO ANXIETY: Chronic | Status: ACTIVE | Noted: 2017-01-30

## 2021-06-21 PROBLEM — F51.05 INSOMNIA SECONDARY TO ANXIETY: Chronic | Status: ACTIVE | Noted: 2017-01-30

## 2021-06-21 LAB
ALBUMIN SERPL-MCNC: 4.7 G/DL (ref 3.5–5.2)
ALBUMIN/GLOB SERPL: 1.7 G/DL
ALP SERPL-CCNC: 111 U/L (ref 39–117)
ALT SERPL-CCNC: 18 U/L (ref 1–33)
AST SERPL-CCNC: 14 U/L (ref 1–32)
BILIRUB SERPL-MCNC: 0.5 MG/DL (ref 0–1.2)
BUN SERPL-MCNC: 17 MG/DL (ref 8–23)
BUN/CREAT SERPL: 18.9 (ref 7–25)
CALCIUM SERPL-MCNC: 9.7 MG/DL (ref 8.6–10.5)
CHLORIDE SERPL-SCNC: 101 MMOL/L (ref 98–107)
CHOLEST SERPL-MCNC: 196 MG/DL (ref 0–200)
CO2 SERPL-SCNC: 29.3 MMOL/L (ref 22–29)
CREAT SERPL-MCNC: 0.9 MG/DL (ref 0.57–1)
ERYTHROCYTE [DISTWIDTH] IN BLOOD BY AUTOMATED COUNT: 13 % (ref 12.3–15.4)
GLOBULIN SER CALC-MCNC: 2.7 GM/DL
GLUCOSE SERPL-MCNC: 95 MG/DL (ref 65–99)
HCT VFR BLD AUTO: 46.7 % (ref 34–46.6)
HDLC SERPL-MCNC: 81 MG/DL (ref 40–60)
HGB BLD-MCNC: 15.6 G/DL (ref 12–15.9)
LDLC SERPL CALC-MCNC: 99 MG/DL (ref 0–100)
MCH RBC QN AUTO: 31.6 PG (ref 26.6–33)
MCHC RBC AUTO-ENTMCNC: 33.4 G/DL (ref 31.5–35.7)
MCV RBC AUTO: 94.5 FL (ref 79–97)
PLATELET # BLD AUTO: 369 10*3/MM3 (ref 140–450)
POTASSIUM SERPL-SCNC: 3.8 MMOL/L (ref 3.5–5.2)
PROT SERPL-MCNC: 7.4 G/DL (ref 6–8.5)
RBC # BLD AUTO: 4.94 10*6/MM3 (ref 3.77–5.28)
SODIUM SERPL-SCNC: 140 MMOL/L (ref 136–145)
TRIGL SERPL-MCNC: 92 MG/DL (ref 0–150)
VLDLC SERPL CALC-MCNC: 16 MG/DL (ref 5–40)
WBC # BLD AUTO: 14 10*3/MM3 (ref 3.4–10.8)

## 2021-06-21 PROCEDURE — G0439 PPPS, SUBSEQ VISIT: HCPCS | Performed by: FAMILY MEDICINE

## 2021-10-05 ENCOUNTER — IMMUNIZATION (OUTPATIENT)
Dept: VACCINE CLINIC | Facility: HOSPITAL | Age: 71
End: 2021-10-05

## 2021-10-05 ENCOUNTER — APPOINTMENT (OUTPATIENT)
Dept: VACCINE CLINIC | Facility: HOSPITAL | Age: 71
End: 2021-10-05

## 2021-10-05 PROCEDURE — 0004A ADM SARSCOV2 30MCG/0.3ML BOOSTER: CPT | Performed by: INTERNAL MEDICINE

## 2021-10-05 PROCEDURE — 0001A: CPT | Performed by: INTERNAL MEDICINE

## 2021-10-05 PROCEDURE — 91300 HC SARSCOV02 VAC 30MCG/0.3ML IM: CPT | Performed by: INTERNAL MEDICINE

## 2021-10-06 DIAGNOSIS — I10 BENIGN ESSENTIAL HYPERTENSION: ICD-10-CM

## 2021-10-07 RX ORDER — TRIAMTERENE AND HYDROCHLOROTHIAZIDE 75; 50 MG/1; MG/1
TABLET ORAL
Qty: 45 TABLET | Refills: 0 | Status: SHIPPED | OUTPATIENT
Start: 2021-10-07 | End: 2022-01-10

## 2022-01-10 ENCOUNTER — OFFICE VISIT (OUTPATIENT)
Dept: INTERNAL MEDICINE | Facility: CLINIC | Age: 72
End: 2022-01-10

## 2022-01-10 VITALS
HEART RATE: 101 BPM | TEMPERATURE: 97.7 F | BODY MASS INDEX: 34.38 KG/M2 | HEIGHT: 63 IN | WEIGHT: 194 LBS | DIASTOLIC BLOOD PRESSURE: 80 MMHG | OXYGEN SATURATION: 98 % | SYSTOLIC BLOOD PRESSURE: 128 MMHG

## 2022-01-10 DIAGNOSIS — J30.1 NON-SEASONAL ALLERGIC RHINITIS DUE TO POLLEN: Chronic | ICD-10-CM

## 2022-01-10 DIAGNOSIS — F41.1 GENERALIZED ANXIETY DISORDER: ICD-10-CM

## 2022-01-10 DIAGNOSIS — I10 BENIGN ESSENTIAL HYPERTENSION: Primary | ICD-10-CM

## 2022-01-10 DIAGNOSIS — N32.81 OVERACTIVE BLADDER: ICD-10-CM

## 2022-01-10 PROBLEM — H40.10X0 OPEN-ANGLE GLAUCOMA OF BOTH EYES: Status: ACTIVE | Noted: 2021-03-12

## 2022-01-10 PROCEDURE — 99214 OFFICE O/P EST MOD 30 MIN: CPT | Performed by: FAMILY MEDICINE

## 2022-01-10 RX ORDER — TRIAMTERENE AND HYDROCHLOROTHIAZIDE 37.5; 25 MG/1; MG/1
1 TABLET ORAL DAILY
Qty: 90 TABLET | Refills: 3 | Status: SHIPPED | OUTPATIENT
Start: 2022-01-10 | End: 2023-03-09 | Stop reason: SDUPTHER

## 2022-01-10 RX ORDER — VIBEGRON 75 MG/1
75 TABLET, FILM COATED ORAL DAILY
COMMUNITY
End: 2022-01-10

## 2022-01-10 RX ORDER — VENLAFAXINE HYDROCHLORIDE 150 MG/1
150 CAPSULE, EXTENDED RELEASE ORAL DAILY
Qty: 90 CAPSULE | Refills: 3 | Status: SHIPPED | OUTPATIENT
Start: 2022-01-10 | End: 2023-01-18 | Stop reason: SDUPTHER

## 2022-01-10 RX ORDER — MONTELUKAST SODIUM 10 MG/1
10 TABLET ORAL NIGHTLY
Qty: 90 TABLET | Refills: 3 | Status: SHIPPED | OUTPATIENT
Start: 2022-01-10 | End: 2023-03-09 | Stop reason: SDUPTHER

## 2022-01-10 RX ORDER — AMLODIPINE BESYLATE 5 MG/1
5 TABLET ORAL DAILY
Qty: 90 TABLET | Refills: 3 | Status: SHIPPED | OUTPATIENT
Start: 2022-01-10 | End: 2023-03-09 | Stop reason: SDUPTHER

## 2022-01-10 RX ORDER — OXYBUTYNIN CHLORIDE 5 MG/1
5 TABLET ORAL 3 TIMES DAILY
Qty: 90 TABLET | Refills: 5 | Status: SHIPPED | OUTPATIENT
Start: 2022-01-10 | End: 2023-03-09 | Stop reason: SDUPTHER

## 2022-01-10 NOTE — PROGRESS NOTES
"Chief Complaint  Hypertension (6 month follow up-needs all meds refilled) and Anxiety    Subjective          Mary Cueva presents to [facility]  History of Present Illness     Hypertension - stable.  Patient taking medication as prescribed.  Denies chest pain, shortness of breath, headache, lower extremity edema.  Patient is taking amlodipine 5 mg, triamterene-HCTZ 75-50 mg and takes 1/2 tablet daily.    Anxiety disorder-stable.  Patient is taking venlafaxine  mg daily.  The patient is not having any side effects.  Denies depression, poor concentration, poor sleep, fatigue, irritability.    Allergic rhinitis-takes montelukast 10 mg daily along with Flonase and is stable with her allergy symptoms.    Overactive bladder - Likes the Gemtesa but is willing to try the oxybutynin given the poor coverage from Medicare.    Objective   Vital Signs:   /80 (BP Location: Left arm, Patient Position: Sitting, Cuff Size: Adult)   Pulse 101   Temp 97.7 °F (36.5 °C)   Ht 160 cm (63\")   Wt 88 kg (194 lb)   SpO2 98%   BMI 34.37 kg/m²     Physical Exam  Vitals and nursing note reviewed.   Constitutional:       General: She is not in acute distress.     Appearance: Normal appearance.   Cardiovascular:      Rate and Rhythm: Normal rate and regular rhythm.      Heart sounds: Normal heart sounds. No murmur heard.      Pulmonary:      Effort: Pulmonary effort is normal.      Breath sounds: Normal breath sounds.   Neurological:      Mental Status: She is alert.   Psychiatric:         Attention and Perception: Attention and perception normal.         Mood and Affect: Mood and affect normal.         Speech: Speech normal.         Behavior: Behavior normal. Behavior is cooperative.         Thought Content: Thought content normal.         Judgment: Judgment normal.        Result Review :   The following data was reviewed by: Lamonte Christopher MD on 01/10/2022:  Common labs    Common Labsle " 6/21/21 6/21/21 6/21/21    1137 1137 1137   Glucose  95    BUN  17    Creatinine  0.90    eGFR Non  Am  62    eGFR  Am  75    Sodium  140    Potassium  3.8    Chloride  101    Calcium  9.7    Total Protein  7.4    Albumin  4.70    Total Bilirubin  0.5    Alkaline Phosphatase  111    AST (SGOT)  14    ALT (SGPT)  18    WBC 14.00 (A)     Hemoglobin 15.6     Hematocrit 46.7 (A)     Platelets 369     Total Cholesterol   196   Triglycerides   92   HDL Cholesterol   81 (A)   LDL Cholesterol    99   (A) Abnormal value       Comments are available for some flowsheets but are not being displayed.                     Assessment and Plan    Diagnoses and all orders for this visit:    1. Benign essential hypertension (Primary)  -     triamterene-hydrochlorothiazide (MAXZIDE-25) 37.5-25 MG per tablet; Take 1 tablet by mouth Daily.  Dispense: 90 tablet; Refill: 3  -     amLODIPine (NORVASC) 5 MG tablet; Take 1 tablet by mouth Daily.  Dispense: 90 tablet; Refill: 3    2. Generalized anxiety disorder  -     venlafaxine XR (EFFEXOR-XR) 150 MG 24 hr capsule; Take 1 capsule by mouth Daily.  Dispense: 90 capsule; Refill: 3    3. Non-seasonal allergic rhinitis due to pollen  -     montelukast (SINGULAIR) 10 MG tablet; Take 1 tablet by mouth Every Night.  Dispense: 90 tablet; Refill: 3    4. Overactive bladder  -     oxybutynin (DITROPAN) 5 MG tablet; Take 1 tablet by mouth 3 (Three) Times a Day.  Dispense: 90 tablet; Refill: 5      Clinically stable.  I will plan on getting labs at her next visit in the summer.  Refilled medications as above.        Follow Up   Return in about 26 weeks (around 7/11/2022) for Medicare Wellness.  Patient was given instructions and counseling regarding her condition or for health maintenance advice. Please see specific information pulled into the AVS if appropriate.

## 2022-02-23 ENCOUNTER — APPOINTMENT (OUTPATIENT)
Dept: WOMENS IMAGING | Facility: HOSPITAL | Age: 72
End: 2022-02-23

## 2022-02-23 PROCEDURE — 77067 SCR MAMMO BI INCL CAD: CPT | Performed by: RADIOLOGY

## 2022-02-23 PROCEDURE — 77063 BREAST TOMOSYNTHESIS BI: CPT | Performed by: RADIOLOGY

## 2022-05-11 DIAGNOSIS — J30.1 NON-SEASONAL ALLERGIC RHINITIS DUE TO POLLEN: ICD-10-CM

## 2022-05-11 DIAGNOSIS — J30.9 ALLERGIC SINUSITIS: ICD-10-CM

## 2022-05-11 RX ORDER — FLUTICASONE PROPIONATE 50 MCG
SPRAY, SUSPENSION (ML) NASAL
Qty: 48 G | Refills: 0 | Status: SHIPPED | OUTPATIENT
Start: 2022-05-11

## 2022-07-26 ENCOUNTER — OFFICE VISIT (OUTPATIENT)
Dept: INTERNAL MEDICINE | Facility: CLINIC | Age: 72
End: 2022-07-26

## 2022-07-26 VITALS
TEMPERATURE: 98.6 F | DIASTOLIC BLOOD PRESSURE: 82 MMHG | HEART RATE: 93 BPM | BODY MASS INDEX: 33.31 KG/M2 | WEIGHT: 188 LBS | HEIGHT: 63 IN | OXYGEN SATURATION: 98 % | SYSTOLIC BLOOD PRESSURE: 130 MMHG | RESPIRATION RATE: 16 BRPM

## 2022-07-26 DIAGNOSIS — I10 BENIGN ESSENTIAL HYPERTENSION: ICD-10-CM

## 2022-07-26 DIAGNOSIS — E78.5 DYSLIPIDEMIA: ICD-10-CM

## 2022-07-26 DIAGNOSIS — N32.81 OVERACTIVE BLADDER: ICD-10-CM

## 2022-07-26 DIAGNOSIS — Z00.00 MEDICARE ANNUAL WELLNESS VISIT, SUBSEQUENT: Primary | ICD-10-CM

## 2022-07-26 PROCEDURE — 1170F FXNL STATUS ASSESSED: CPT | Performed by: FAMILY MEDICINE

## 2022-07-26 PROCEDURE — G0439 PPPS, SUBSEQ VISIT: HCPCS | Performed by: FAMILY MEDICINE

## 2022-07-26 PROCEDURE — 1160F RVW MEDS BY RX/DR IN RCRD: CPT | Performed by: FAMILY MEDICINE

## 2022-07-26 NOTE — PROGRESS NOTES
The ABCs of the Annual Wellness Visit  Subsequent Medicare Wellness Visit    Chief Complaint   Patient presents with   • Medicare Wellness-subsequent      Subjective    History of Present Illness:  Mary Cueva is a 72 y.o. female who presents for a Subsequent Medicare Wellness Visit.    The following portions of the patient's history were reviewed and   updated as appropriate: allergies, current medications, past family history, past medical history, past social history, past surgical history and problem list.    Compared to one year ago, the patient feels her physical   health is the same.    Compared to one year ago, the patient feels her mental   health is the same.    Recent Hospitalizations:  She was not admitted to the hospital during the last year.       Current Medical Providers:  Patient Care Team:  Lamonte Christopher MD as PCP - General (Family Medicine)  Roxy Knight MD as Referring Physician (Internal Medicine)    Outpatient Medications Prior to Visit   Medication Sig Dispense Refill   • amLODIPine (NORVASC) 5 MG tablet Take 1 tablet by mouth Daily. 90 tablet 3   • fluticasone (FLONASE) 50 MCG/ACT nasal spray Use 2 spray(s) in each nostril once daily 48 g 0   • montelukast (SINGULAIR) 10 MG tablet Take 1 tablet by mouth Every Night. 90 tablet 3   • Multiple Vitamins-Minerals (EYE VITAMINS PO) Take  by mouth. OTC RECOMMENDED BY OPTOMETRIST DUE TO MACULAR DENGENERATION     • oxybutynin (DITROPAN) 5 MG tablet Take 1 tablet by mouth 3 (Three) Times a Day. 90 tablet 5   • triamterene-hydrochlorothiazide (MAXZIDE-25) 37.5-25 MG per tablet Take 1 tablet by mouth Daily. 90 tablet 3   • venlafaxine XR (EFFEXOR-XR) 150 MG 24 hr capsule Take 1 capsule by mouth Daily. 90 capsule 3     No facility-administered medications prior to visit.       No opioid medication identified on active medication list. I have reviewed chart for other potential  high risk medication/s and harmful drug interactions in the  "elderly.          Aspirin is not on active medication list.  Aspirin use is not indicated based on review of current medical condition/s. Risk of harm outweighs potential benefits.  .    Patient Active Problem List   Diagnosis   • Borderline glaucoma with anatomical narrow angle   • Osteopenia   • Vitamin D deficiency   • Adiposity   • Non-seasonal allergic rhinitis due to pollen   • Anxiety disorder   • Benign essential hypertension   • Health care maintenance   • Allergic sinusitis   • Insomnia secondary to anxiety   • Neutrophilic leukocytosis   • Upper back pain, chronic   • Pyelonephritis of left kidney   • Open-angle glaucoma of both eyes   • Overactive bladder     Advance Care Planning  Advance Directive is not on file.  ACP discussion was held with the patient during this visit. Patient has an advance directive (not in EMR), copy requested.          Objective    Vitals:    07/26/22 1053   BP: 130/82   BP Location: Left arm   Patient Position: Sitting   Cuff Size: Large Adult   Pulse: 93   Resp: 16   Temp: 98.6 °F (37 °C)   TempSrc: Temporal   SpO2: 98%   Weight: 85.3 kg (188 lb)   Height: 160 cm (62.99\")     Estimated body mass index is 33.31 kg/m² as calculated from the following:    Height as of this encounter: 160 cm (62.99\").    Weight as of this encounter: 85.3 kg (188 lb).    BMI is >= 30 and <35. (Class 1 Obesity). The following options were offered after discussion;: exercise counseling/recommendations and nutrition counseling/recommendations      Does the patient have evidence of cognitive impairment? No    Physical Exam  Vitals and nursing note reviewed.                 HEALTH RISK ASSESSMENT    Smoking Status:  Social History     Tobacco Use   Smoking Status Never Smoker   Smokeless Tobacco Never Used     Alcohol Consumption:  Social History     Substance and Sexual Activity   Alcohol Use Yes   • Alcohol/week: 1.0 standard drink   • Types: 1 Glasses of wine per week    Comment: 1 drink 3 times a " week      Fall Risk Screen:    [facility] Fall Risk Assessment was completed, and patient is at LOW risk for falls.Assessment completed on:7/26/2022    Depression Screening:  PHQ-2/PHQ-9 Depression Screening 7/26/2022   Retired PHQ-9 Total Score -   Retired Total Score -   Little Interest or Pleasure in Doing Things 1-->several days   Feeling Down, Depressed or Hopeless 1-->several days   Trouble Falling or Staying Asleep, or Sleeping Too Much 0-->not at all   Feeling Tired or Having Little Energy 1-->several days   Poor Appetite or Overeating 0-->not at all   Feeling Bad about Yourself - or that You are a Failure or Have Let Yourself or Your Family Down 0-->not at all   Trouble Concentrating on Things, Such as Reading the Newspaper or Watching Television 0-->not at all   Moving or Speaking So Slowly that Other People Could Have Noticed? Or the Opposite - Being So Fidgety 0-->not at all   Thoughts that You Would be Better Off Dead or of Hurting Yourself in Some Way 0-->not at all   PHQ-9: Brief Depression Severity Measure Score 3   If You Checked Off Any Problems, How Difficult Have These Problems Made It For You to Do Your Work, Take Care of Things at Home, or Get Along with Other People? not difficult at all       Health Habits and Functional and Cognitive Screening:  Functional & Cognitive Status 7/26/2022   Do you have difficulty preparing food and eating? No   Do you have difficulty bathing yourself, getting dressed or grooming yourself? No   Do you have difficulty using the toilet? No   Do you have difficulty moving around from place to place? No   Do you have trouble with steps or getting out of a bed or a chair? No   Current Diet Well Balanced Diet   Dental Exam Up to date   Eye Exam Up to date   Exercise (times per week) 0 times per week   Current Exercises Include No Regular Exercise   Do you need help using the phone?  No   Are you deaf or do you have serious difficulty hearing?  Yes   Do you need help with  transportation? No   Do you need help shopping? No   Do you need help preparing meals?  No   Do you need help with housework?  No   Do you need help with laundry? No   Do you need help taking your medications? No   Do you need help managing money? No   Do you ever drive or ride in a car without wearing a seat belt? No   Have you felt unusual stress, anger or loneliness in the last month? No   Who do you live with? Spouse   If you need help, do you have trouble finding someone available to you? No   Have you been bothered in the last four weeks by sexual problems? -   Do you have difficulty concentrating, remembering or making decisions? No       Age-appropriate Screening Schedule:  Refer to the list below for future screening recommendations based on patient's age, sex and/or medical conditions. Orders for these recommended tests are listed in the plan section. The patient has been provided with a written plan.    Health Maintenance   Topic Date Due   • ZOSTER VACCINE (2 of 2) 09/26/2014   • INFLUENZA VACCINE  10/01/2022   • TDAP/TD VACCINES (2 - Td or Tdap) 11/06/2023   • MAMMOGRAM  02/23/2024   • DXA SCAN  02/23/2025              Assessment & Plan   CMS Preventative Services Quick Reference  Risk Factors Identified During Encounter  Immunizations Discussed/Encouraged (specific Immunizations; COVID19  The above risks/problems have been discussed with the patient.  Follow up actions/plans if indicated are seen below in the Assessment/Plan Section.  Pertinent information has been shared with the patient in the After Visit Summary.    Diagnoses and all orders for this visit:    1. Medicare annual wellness visit, subsequent (Primary)    2. Dyslipidemia  -     CBC (No Diff)  -     Comprehensive Metabolic Panel  -     Lipid Panel With LDL / HDL Ratio    3. Benign essential hypertension  -     CBC (No Diff)  -     Comprehensive Metabolic Panel    4. Overactive bladder  -     Urinalysis With Microscopic - Urine, Clean  Catch            Follow Up:   Return in about 6 months (around 1/30/2023) for Next scheduled follow up.     An After Visit Summary and PPPS were made available to the patient.

## 2022-07-27 LAB
ALBUMIN SERPL-MCNC: 4.1 G/DL (ref 3.7–4.7)
ALBUMIN/GLOB SERPL: 1.4 {RATIO} (ref 1.2–2.2)
ALP SERPL-CCNC: 102 IU/L (ref 44–121)
ALT SERPL-CCNC: 17 IU/L (ref 0–32)
APPEARANCE UR: ABNORMAL
AST SERPL-CCNC: 15 IU/L (ref 0–40)
BACTERIA #/AREA URNS HPF: ABNORMAL /[HPF]
BILIRUB SERPL-MCNC: 0.5 MG/DL (ref 0–1.2)
BILIRUB UR QL STRIP: NEGATIVE
BUN SERPL-MCNC: 13 MG/DL (ref 8–27)
BUN/CREAT SERPL: 17 (ref 12–28)
CALCIUM SERPL-MCNC: 9.8 MG/DL (ref 8.7–10.3)
CASTS URNS QL MICRO: ABNORMAL /LPF
CHLORIDE SERPL-SCNC: 98 MMOL/L (ref 96–106)
CHOLEST SERPL-MCNC: 185 MG/DL (ref 100–199)
CO2 SERPL-SCNC: 28 MMOL/L (ref 20–29)
COLOR UR: YELLOW
CREAT SERPL-MCNC: 0.75 MG/DL (ref 0.57–1)
EGFRCR SERPLBLD CKD-EPI 2021: 84 ML/MIN/1.73
EPI CELLS #/AREA URNS HPF: >10 /HPF (ref 0–10)
ERYTHROCYTE [DISTWIDTH] IN BLOOD BY AUTOMATED COUNT: 13 % (ref 11.7–15.4)
GLOBULIN SER CALC-MCNC: 3 G/DL (ref 1.5–4.5)
GLUCOSE SERPL-MCNC: 86 MG/DL (ref 65–99)
GLUCOSE UR QL STRIP: NEGATIVE
HCT VFR BLD AUTO: 45.1 % (ref 34–46.6)
HDLC SERPL-MCNC: 60 MG/DL
HGB BLD-MCNC: 15.1 G/DL (ref 11.1–15.9)
HGB UR QL STRIP: ABNORMAL
KETONES UR QL STRIP: ABNORMAL
LDLC SERPL CALC-MCNC: 101 MG/DL (ref 0–99)
LDLC/HDLC SERPL: 1.7 RATIO (ref 0–3.2)
LEUKOCYTE ESTERASE UR QL STRIP: ABNORMAL
MCH RBC QN AUTO: 30.6 PG (ref 26.6–33)
MCHC RBC AUTO-ENTMCNC: 33.5 G/DL (ref 31.5–35.7)
MCV RBC AUTO: 92 FL (ref 79–97)
MICRO URNS: ABNORMAL
MUCOUS THREADS URNS QL MICRO: PRESENT
NITRITE UR QL STRIP: POSITIVE
PH UR STRIP: 6 [PH] (ref 5–7.5)
PLATELET # BLD AUTO: 345 X10E3/UL (ref 150–450)
POTASSIUM SERPL-SCNC: 3.6 MMOL/L (ref 3.5–5.2)
PROT SERPL-MCNC: 7.1 G/DL (ref 6–8.5)
PROT UR QL STRIP: ABNORMAL
RBC # BLD AUTO: 4.93 X10E6/UL (ref 3.77–5.28)
RBC #/AREA URNS HPF: ABNORMAL /HPF (ref 0–2)
SODIUM SERPL-SCNC: 140 MMOL/L (ref 134–144)
SP GR UR STRIP: 1.02 (ref 1–1.03)
TRIGL SERPL-MCNC: 139 MG/DL (ref 0–149)
UROBILINOGEN UR STRIP-MCNC: 0.2 MG/DL (ref 0.2–1)
VLDLC SERPL CALC-MCNC: 24 MG/DL (ref 5–40)
WBC # BLD AUTO: 15.4 X10E3/UL (ref 3.4–10.8)
WBC #/AREA URNS HPF: >30 /HPF (ref 0–5)

## 2022-07-28 DIAGNOSIS — N30.01 ACUTE CYSTITIS WITH HEMATURIA: Primary | ICD-10-CM

## 2022-07-28 DIAGNOSIS — D72.829 LEUKOCYTOSIS, UNSPECIFIED TYPE: ICD-10-CM

## 2022-07-28 RX ORDER — NITROFURANTOIN 25; 75 MG/1; MG/1
100 CAPSULE ORAL 2 TIMES DAILY
Qty: 10 CAPSULE | Refills: 0 | Status: SHIPPED | OUTPATIENT
Start: 2022-07-28 | End: 2022-08-02

## 2023-01-18 DIAGNOSIS — F41.1 GENERALIZED ANXIETY DISORDER: ICD-10-CM

## 2023-01-18 NOTE — TELEPHONE ENCOUNTER
Caller: Mary Cueva    Relationship: Self    Best call back number: [phone]  Requested Prescriptions:   Requested Prescriptions     Pending Prescriptions Disp Refills   • venlafaxine XR (EFFEXOR-XR) 150 MG 24 hr capsule 90 capsule 3     Sig: Take 1 capsule by mouth Daily.        Pharmacy where request should be sent: [place] [phone] [facility] [phone] FX         Does the patient have less than a 3 day supply:  [] Yes  [x] No    Would you like a call back once the refill request has been completed: [x] Yes [] No    If the office needs to give you a call back, can they leave a voicemail: [x] Yes [] No    Juan Manuel Jimenez   01/18/23 15:48 EST

## 2023-01-19 RX ORDER — VENLAFAXINE HYDROCHLORIDE 150 MG/1
150 CAPSULE, EXTENDED RELEASE ORAL DAILY
Qty: 90 CAPSULE | Refills: 1 | Status: SHIPPED | OUTPATIENT
Start: 2023-01-19

## 2023-03-09 DIAGNOSIS — N32.81 OVERACTIVE BLADDER: ICD-10-CM

## 2023-03-09 DIAGNOSIS — J30.1 NON-SEASONAL ALLERGIC RHINITIS DUE TO POLLEN: Chronic | ICD-10-CM

## 2023-03-09 DIAGNOSIS — I10 BENIGN ESSENTIAL HYPERTENSION: ICD-10-CM

## 2023-03-09 RX ORDER — AMLODIPINE BESYLATE 5 MG/1
5 TABLET ORAL DAILY
Qty: 90 TABLET | Refills: 3 | Status: SHIPPED | OUTPATIENT
Start: 2023-03-09

## 2023-03-09 RX ORDER — MONTELUKAST SODIUM 10 MG/1
10 TABLET ORAL NIGHTLY
Qty: 90 TABLET | Refills: 3 | Status: SHIPPED | OUTPATIENT
Start: 2023-03-09

## 2023-03-09 RX ORDER — OXYBUTYNIN CHLORIDE 5 MG/1
5 TABLET ORAL 3 TIMES DAILY
Qty: 90 TABLET | Refills: 5 | Status: SHIPPED | OUTPATIENT
Start: 2023-03-09

## 2023-03-09 RX ORDER — TRIAMTERENE AND HYDROCHLOROTHIAZIDE 37.5; 25 MG/1; MG/1
1 TABLET ORAL DAILY
Qty: 90 TABLET | Refills: 3 | Status: SHIPPED | OUTPATIENT
Start: 2023-03-09

## 2023-03-09 NOTE — TELEPHONE ENCOUNTER
"Caller: Mary Cueva \"Shaunna\"    Relationship: Self    Best call back number: [phone]    Requested Prescriptions:   Requested Prescriptions     Pending Prescriptions Disp Refills   • amLODIPine (NORVASC) 5 MG tablet 90 tablet 3     Sig: Take 1 tablet by mouth Daily.   • triamterene-hydrochlorothiazide (MAXZIDE-25) 37.5-25 MG per tablet 90 tablet 3     Sig: Take 1 tablet by mouth Daily.   • oxybutynin (DITROPAN) 5 MG tablet 90 tablet 5     Sig: Take 1 tablet by mouth 3 (Three) Times a Day.   • montelukast (SINGULAIR) 10 MG tablet 90 tablet 3     Sig: Take 1 tablet by mouth Every Night.        Pharmacy where request should be sent: [address] [phone] [facility] [phone] FX     Additional details provided by patient: PATIENT STATES SHE HAS ABOUT 3 DAYS LEFT ON THESE PRESCRIPTIONS.    PATIENT STATES SHE NEEDS A 90 DAY SUPPLY OF THESE PRESCRIPTIONS.    Does the patient have less than a 3 day supply:  [x] Yes  [] No    Would you like a call back once the refill request has been completed: [] Yes [x] No    If the office needs to give you a call back, can they leave a voicemail: [] Yes [x] No    Ramses Cotto Rep   03/09/23 14:04 EST           "

## 2023-05-19 ENCOUNTER — OFFICE VISIT (OUTPATIENT)
Dept: INTERNAL MEDICINE | Facility: CLINIC | Age: 73
End: 2023-05-19
Payer: MEDICARE

## 2023-05-19 VITALS
HEART RATE: 105 BPM | OXYGEN SATURATION: 98 % | BODY MASS INDEX: 34.41 KG/M2 | HEIGHT: 62 IN | DIASTOLIC BLOOD PRESSURE: 82 MMHG | SYSTOLIC BLOOD PRESSURE: 130 MMHG | RESPIRATION RATE: 18 BRPM | WEIGHT: 187 LBS

## 2023-05-19 DIAGNOSIS — M79.10 MYALGIA: ICD-10-CM

## 2023-05-19 DIAGNOSIS — G47.00 INSOMNIA, UNSPECIFIED TYPE: ICD-10-CM

## 2023-05-19 DIAGNOSIS — I10 BENIGN ESSENTIAL HYPERTENSION: Primary | Chronic | ICD-10-CM

## 2023-05-19 DIAGNOSIS — F41.1 GENERALIZED ANXIETY DISORDER: Chronic | ICD-10-CM

## 2023-05-19 DIAGNOSIS — J30.1 NON-SEASONAL ALLERGIC RHINITIS DUE TO POLLEN: ICD-10-CM

## 2023-05-19 DIAGNOSIS — E78.5 DYSLIPIDEMIA: ICD-10-CM

## 2023-05-19 PROCEDURE — 3079F DIAST BP 80-89 MM HG: CPT | Performed by: FAMILY MEDICINE

## 2023-05-19 PROCEDURE — 1159F MED LIST DOCD IN RCRD: CPT | Performed by: FAMILY MEDICINE

## 2023-05-19 PROCEDURE — 1160F RVW MEDS BY RX/DR IN RCRD: CPT | Performed by: FAMILY MEDICINE

## 2023-05-19 PROCEDURE — 3075F SYST BP GE 130 - 139MM HG: CPT | Performed by: FAMILY MEDICINE

## 2023-05-19 PROCEDURE — 99214 OFFICE O/P EST MOD 30 MIN: CPT | Performed by: FAMILY MEDICINE

## 2023-05-19 RX ORDER — VENLAFAXINE HYDROCHLORIDE 150 MG/1
150 CAPSULE, EXTENDED RELEASE ORAL DAILY
Qty: 90 CAPSULE | Refills: 4 | Status: SHIPPED | OUTPATIENT
Start: 2023-05-19

## 2023-05-19 RX ORDER — FLUTICASONE PROPIONATE 50 MCG
2 SPRAY, SUSPENSION (ML) NASAL DAILY
Qty: 48 G | Refills: 4 | Status: SHIPPED | OUTPATIENT
Start: 2023-05-19

## 2023-05-19 NOTE — PROGRESS NOTES
"Chief Complaint  Follow-up, Hypertension, Leg Pain (Leg aches), and Insomnia    Subjective        Mary Cueva presents to [facility]  History of Present Illness     Hypertension - stable today in the office.  Patient taking medication as prescribed.    Patient is taking amlodipine, triamterene-HCTZ.  Denies side effects of the medication.    HLD- Trying to adhere to a balanced diet.  Due for recheck.    Anxiety disorder-stable.  Patient is taking venlafaxine XR.  The patient is not having any side effects.  Denies depression, poor concentration, poor sleep, fatigue, irritability.    She is having intermittent mylagias of the lower extremities through the day.  Water intake has not been good and is consuming stimulant beverages in the AM.  She is on diuretic for blood pressure and no cholesterol medicine.    Also complaining of problems with insomnia.  She is drinking caffeine throughout the day and up until 10 PM at night.  She was counseled that she needs to slowly walk back her caffeine use and cut it off about 12 PM every day and balance with more water intake instead.  I explained that the combination of the stimulant use with venlafaxine is likely contributing to her difficulty sleeping.    Objective   Vital Signs:  /82 (BP Location: Left arm, Patient Position: Sitting, Cuff Size: Small Adult)   Pulse 105   Resp 18   Ht 157.5 cm (62\")   Wt 84.8 kg (187 lb)   SpO2 98%   BMI 34.20 kg/m²   Estimated body mass index is 34.2 kg/m² as calculated from the following:    Height as of this encounter: 157.5 cm (62\").    Weight as of this encounter: 84.8 kg (187 lb).             Physical Exam  Vitals and nursing note reviewed.   Constitutional:       General: She is not in acute distress.     Appearance: Normal appearance.   Cardiovascular:      Rate and Rhythm: Normal rate and regular rhythm.      Heart sounds: Normal heart sounds. No murmur heard.  Pulmonary:      Effort: " Pulmonary effort is normal.      Breath sounds: Normal breath sounds.   Neurological:      Mental Status: She is alert.   Psychiatric:         Mood and Affect: Mood normal.         Behavior: Behavior normal.         Thought Content: Thought content normal.         Judgment: Judgment normal.        Result Review :  The following data was reviewed by: Lamonte Christopher MD on 05/19/2023:  Common labs        7/26/2022    11:55   Common Labs   Glucose 86     BUN 13     Creatinine 0.75     Sodium 140     Potassium 3.6     Chloride 98     Calcium 9.8     Total Protein 7.1     Albumin 4.1     Total Bilirubin 0.5     Alkaline Phosphatase 102     AST (SGOT) 15     ALT (SGPT) 17     WBC 15.4     Hemoglobin 15.1     Hematocrit 45.1     Platelets 345     Total Cholesterol 185     Triglycerides 139     HDL Cholesterol 60     LDL Cholesterol  101                    Assessment and Plan   Diagnoses and all orders for this visit:    1. Benign essential hypertension (Primary)  -     CBC (No Diff)  -     Comprehensive Metabolic Panel    2. Generalized anxiety disorder  -     venlafaxine XR (EFFEXOR-XR) 150 MG 24 hr capsule; Take 1 capsule by mouth Daily.  Dispense: 90 capsule; Refill: 4    3. Dyslipidemia  -     CBC (No Diff)  -     Comprehensive Metabolic Panel  -     Lipid Panel With LDL / HDL Ratio    4. Non-seasonal allergic rhinitis due to pollen  -     fluticasone (FLONASE) 50 MCG/ACT nasal spray; 2 sprays into the nostril(s) as directed by provider Daily.  Dispense: 48 g; Refill: 4    5. Insomnia, unspecified type    6. Myalgia         Clinically stable chronic conditions as above.  Continue all medications as above.  Labs reviewed/ordered as above.    Encourage fluid intake to help with the myalgias and weaning caffeine to help with the insomnia.  I refilled her fluticasone for her allergic rhinitis which is stable.         Follow Up   Return in about 6 months (around 11/19/2023) for Next scheduled follow up.  Patient was given  instructions and counseling regarding her condition or for health maintenance advice. Please see specific information pulled into the AVS if appropriate.

## 2023-05-20 LAB
ALBUMIN SERPL-MCNC: 4.6 G/DL (ref 3.5–5.2)
ALBUMIN/GLOB SERPL: 1.6 G/DL
ALP SERPL-CCNC: 98 U/L (ref 39–117)
ALT SERPL-CCNC: 16 U/L (ref 1–33)
AST SERPL-CCNC: 17 U/L (ref 1–32)
BILIRUB SERPL-MCNC: 0.5 MG/DL (ref 0–1.2)
BUN SERPL-MCNC: 16 MG/DL (ref 8–23)
BUN/CREAT SERPL: 18.2 (ref 7–25)
CALCIUM SERPL-MCNC: 10 MG/DL (ref 8.6–10.5)
CHLORIDE SERPL-SCNC: 99 MMOL/L (ref 98–107)
CHOLEST SERPL-MCNC: 212 MG/DL (ref 0–200)
CO2 SERPL-SCNC: 30.4 MMOL/L (ref 22–29)
CREAT SERPL-MCNC: 0.88 MG/DL (ref 0.57–1)
EGFRCR SERPLBLD CKD-EPI 2021: 69.1 ML/MIN/1.73
ERYTHROCYTE [DISTWIDTH] IN BLOOD BY AUTOMATED COUNT: 12.9 % (ref 12.3–15.4)
GLOBULIN SER CALC-MCNC: 2.8 GM/DL
GLUCOSE SERPL-MCNC: 104 MG/DL (ref 65–99)
HCT VFR BLD AUTO: 45.8 % (ref 34–46.6)
HDLC SERPL-MCNC: 73 MG/DL (ref 40–60)
HGB BLD-MCNC: 15.8 G/DL (ref 12–15.9)
LDLC SERPL CALC-MCNC: 121 MG/DL (ref 0–100)
LDLC/HDLC SERPL: 1.62 {RATIO}
MCH RBC QN AUTO: 31.1 PG (ref 26.6–33)
MCHC RBC AUTO-ENTMCNC: 34.5 G/DL (ref 31.5–35.7)
MCV RBC AUTO: 90.2 FL (ref 79–97)
PLATELET # BLD AUTO: 345 10*3/MM3 (ref 140–450)
POTASSIUM SERPL-SCNC: 4.2 MMOL/L (ref 3.5–5.2)
PROT SERPL-MCNC: 7.4 G/DL (ref 6–8.5)
RBC # BLD AUTO: 5.08 10*6/MM3 (ref 3.77–5.28)
SODIUM SERPL-SCNC: 141 MMOL/L (ref 136–145)
TRIGL SERPL-MCNC: 104 MG/DL (ref 0–150)
VLDLC SERPL CALC-MCNC: 18 MG/DL (ref 5–40)
WBC # BLD AUTO: 13.65 10*3/MM3 (ref 3.4–10.8)

## 2024-01-26 ENCOUNTER — OFFICE VISIT (OUTPATIENT)
Dept: INTERNAL MEDICINE | Facility: CLINIC | Age: 74
End: 2024-01-26
Payer: MEDICARE

## 2024-01-26 VITALS
OXYGEN SATURATION: 98 % | DIASTOLIC BLOOD PRESSURE: 80 MMHG | BODY MASS INDEX: 32.02 KG/M2 | WEIGHT: 174 LBS | RESPIRATION RATE: 18 BRPM | HEIGHT: 62 IN | HEART RATE: 78 BPM | SYSTOLIC BLOOD PRESSURE: 128 MMHG

## 2024-01-26 DIAGNOSIS — J30.1 NON-SEASONAL ALLERGIC RHINITIS DUE TO POLLEN: Chronic | ICD-10-CM

## 2024-01-26 DIAGNOSIS — E78.5 DYSLIPIDEMIA: Chronic | ICD-10-CM

## 2024-01-26 DIAGNOSIS — F41.1 GENERALIZED ANXIETY DISORDER: Chronic | ICD-10-CM

## 2024-01-26 DIAGNOSIS — Z00.00 MEDICARE ANNUAL WELLNESS VISIT, SUBSEQUENT: Primary | ICD-10-CM

## 2024-01-26 DIAGNOSIS — N32.81 OVERACTIVE BLADDER: ICD-10-CM

## 2024-01-26 DIAGNOSIS — I10 BENIGN ESSENTIAL HYPERTENSION: Chronic | ICD-10-CM

## 2024-01-26 LAB
ALBUMIN SERPL-MCNC: 4.5 G/DL (ref 3.5–5.2)
ALBUMIN/GLOB SERPL: 1.6 G/DL
ALP SERPL-CCNC: 89 U/L (ref 39–117)
ALT SERPL-CCNC: 12 U/L (ref 1–33)
AST SERPL-CCNC: 16 U/L (ref 1–32)
BILIRUB SERPL-MCNC: 0.5 MG/DL (ref 0–1.2)
BUN SERPL-MCNC: 20 MG/DL (ref 8–23)
BUN/CREAT SERPL: 24.1 (ref 7–25)
CALCIUM SERPL-MCNC: 10.4 MG/DL (ref 8.6–10.5)
CHLORIDE SERPL-SCNC: 99 MMOL/L (ref 98–107)
CHOLEST SERPL-MCNC: 212 MG/DL (ref 0–200)
CO2 SERPL-SCNC: 31.6 MMOL/L (ref 22–29)
CREAT SERPL-MCNC: 0.83 MG/DL (ref 0.57–1)
EGFRCR SERPLBLD CKD-EPI 2021: 74.1 ML/MIN/1.73
ERYTHROCYTE [DISTWIDTH] IN BLOOD BY AUTOMATED COUNT: 13 % (ref 12.3–15.4)
GLOBULIN SER CALC-MCNC: 2.9 GM/DL
GLUCOSE SERPL-MCNC: 95 MG/DL (ref 65–99)
HCT VFR BLD AUTO: 45.9 % (ref 34–46.6)
HDLC SERPL-MCNC: 79 MG/DL (ref 40–60)
HGB BLD-MCNC: 15.8 G/DL (ref 12–15.9)
LDLC SERPL CALC-MCNC: 117 MG/DL (ref 0–100)
LDLC/HDLC SERPL: 1.46 {RATIO}
MCH RBC QN AUTO: 30.7 PG (ref 26.6–33)
MCHC RBC AUTO-ENTMCNC: 34.4 G/DL (ref 31.5–35.7)
MCV RBC AUTO: 89.1 FL (ref 79–97)
PLATELET # BLD AUTO: 361 10*3/MM3 (ref 140–450)
POTASSIUM SERPL-SCNC: 3.7 MMOL/L (ref 3.5–5.2)
PROT SERPL-MCNC: 7.4 G/DL (ref 6–8.5)
RBC # BLD AUTO: 5.15 10*6/MM3 (ref 3.77–5.28)
SODIUM SERPL-SCNC: 141 MMOL/L (ref 136–145)
TRIGL SERPL-MCNC: 90 MG/DL (ref 0–150)
VLDLC SERPL CALC-MCNC: 16 MG/DL (ref 5–40)
WBC # BLD AUTO: 11.47 10*3/MM3 (ref 3.4–10.8)

## 2024-01-26 RX ORDER — AMLODIPINE BESYLATE 5 MG/1
5 TABLET ORAL DAILY
Qty: 90 TABLET | Refills: 4 | Status: SHIPPED | OUTPATIENT
Start: 2024-01-26

## 2024-01-26 RX ORDER — TRIAMTERENE AND HYDROCHLOROTHIAZIDE 37.5; 25 MG/1; MG/1
1 TABLET ORAL DAILY
Qty: 90 TABLET | Refills: 4 | Status: SHIPPED | OUTPATIENT
Start: 2024-01-26

## 2024-01-26 RX ORDER — MONTELUKAST SODIUM 10 MG/1
10 TABLET ORAL NIGHTLY
Qty: 90 TABLET | Refills: 4 | Status: SHIPPED | OUTPATIENT
Start: 2024-01-26

## 2024-01-26 RX ORDER — OXYBUTYNIN CHLORIDE 5 MG/1
5 TABLET ORAL DAILY
Start: 2024-01-26

## 2024-01-26 NOTE — PROGRESS NOTES
The ABCs of the Annual Wellness Visit  Subsequent Medicare Wellness Visit    Subjective    Mary Cueva is a 73 y.o. female who presents for a Subsequent Medicare Wellness Visit.    The following portions of the patient's history were reviewed and   updated as appropriate: allergies, current medications, past family history, past medical history, past social history, past surgical history, and problem list.    Compared to one year ago, the patient feels her physical   health is the same.    Compared to one year ago, the patient feels her mental   health is the same.    Recent Hospitalizations:  She was not admitted to the hospital during the last year.       Current Medical Providers:  Patient Care Team:  Lamonte Christopher MD as PCP - General (Family Medicine)  Roxy Knight MD as Referring Physician (Internal Medicine)    Outpatient Medications Prior to Visit   Medication Sig Dispense Refill    fluticasone (FLONASE) 50 MCG/ACT nasal spray 2 sprays into the nostril(s) as directed by provider Daily. 48 g 4    Multiple Vitamins-Minerals (EYE VITAMINS PO) Take  by mouth. OTC RECOMMENDED BY OPTOMETRIST DUE TO MACULAR DENGENERATION      venlafaxine XR (EFFEXOR-XR) 150 MG 24 hr capsule Take 1 capsule by mouth Daily. 90 capsule 4    amLODIPine (NORVASC) 5 MG tablet Take 1 tablet by mouth Daily. 90 tablet 3    montelukast (SINGULAIR) 10 MG tablet Take 1 tablet by mouth Every Night. 90 tablet 3    oxybutynin (DITROPAN) 5 MG tablet Take 1 tablet by mouth 3 (Three) Times a Day. 90 tablet 5    triamterene-hydrochlorothiazide (MAXZIDE-25) 37.5-25 MG per tablet Take 1 tablet by mouth Daily. 90 tablet 3     No facility-administered medications prior to visit.       No opioid medication identified on active medication list. I have reviewed chart for other potential  high risk medication/s and harmful drug interactions in the elderly.        Aspirin is not on active medication list.  Aspirin use is not indicated based on review  "of current medical condition/s. Risk of harm outweighs potential benefits.  .    Patient Active Problem List   Diagnosis    Borderline glaucoma with anatomical narrow angle    Osteopenia    Vitamin D deficiency    Adiposity    Non-seasonal allergic rhinitis due to pollen    Generalized anxiety disorder    Benign essential hypertension    Health care maintenance    Allergic sinusitis    Insomnia secondary to anxiety    Neutrophilic leukocytosis    Upper back pain, chronic    Pyelonephritis of left kidney    Open-angle glaucoma of both eyes    Overactive bladder    Dyslipidemia     Advance Care Planning   Advance Care Planning     Advance Directive is not on file.  ACP discussion was held with the patient during this visit. Patient has an advance directive (not in EMR), copy requested.     Objective    Vitals:    01/26/24 1039   BP: 128/80   BP Location: Right arm   Patient Position: Sitting   Cuff Size: Small Adult   Pulse: 78   Resp: 18   SpO2: 98%   Weight: 78.9 kg (174 lb)   Height: 157.5 cm (62\")     Estimated body mass index is 31.83 kg/m² as calculated from the following:    Height as of this encounter: 157.5 cm (62\").    Weight as of this encounter: 78.9 kg (174 lb).    BMI is >= 30 and <35. (Class 1 Obesity). The following options were offered after discussion;: weight loss educational material (shared in after visit summary), exercise counseling/recommendations, and nutrition counseling/recommendations      Does the patient have evidence of cognitive impairment? No          HEALTH RISK ASSESSMENT    Smoking Status:  Social History     Tobacco Use   Smoking Status Never   Smokeless Tobacco Never     Alcohol Consumption:  Social History     Substance and Sexual Activity   Alcohol Use Yes    Alcohol/week: 2.0 standard drinks of alcohol    Types: 2 Glasses of wine per week    Comment: 1 drink 3 times a week      Fall Risk Screen:    STEADI Fall Risk Assessment was completed, and patient is at LOW risk for " falls.Assessment completed on:2024    Depression Screenin/26/2024    10:43 AM   PHQ-2/PHQ-9 Depression Screening   Little Interest or Pleasure in Doing Things 0-->not at all   Feeling Down, Depressed or Hopeless 0-->not at all   PHQ-9: Brief Depression Severity Measure Score 0       Health Habits and Functional and Cognitive Screenin/26/2024    10:42 AM   Functional & Cognitive Status   Do you have difficulty preparing food and eating? No   Do you have difficulty bathing yourself, getting dressed or grooming yourself? No   Do you have difficulty using the toilet? No   Do you have difficulty moving around from place to place? No   Do you have trouble with steps or getting out of a bed or a chair? No   Current Diet Well Balanced Diet   Dental Exam Up to date   Eye Exam Up to date   Exercise (times per week) 0 times per week   Current Exercises Include No Regular Exercise   Do you need help using the phone?  No   Are you deaf or do you have serious difficulty hearing?  Yes   Do you need help to go to places out of walking distance? No   Do you need help shopping? No   Do you need help preparing meals?  No   Do you need help with housework?  No   Do you need help with laundry? No   Do you need help taking your medications? No   Do you need help managing money? No   Do you ever drive or ride in a car without wearing a seat belt? No   Have you felt unusual stress, anger or loneliness in the last month? No   Who do you live with? Spouse   If you need help, do you have trouble finding someone available to you? No   Have you been bothered in the last four weeks by sexual problems? No   Do you have difficulty concentrating, remembering or making decisions? No       Age-appropriate Screening Schedule:  Refer to the list below for future screening recommendations based on patient's age, sex and/or medical conditions. Orders for these recommended tests are listed in the plan section. The patient has been  provided with a written plan.    Health Maintenance   Topic Date Due    ZOSTER VACCINE (2 of 2) 09/26/2014    ANNUAL WELLNESS VISIT  07/26/2023    INFLUENZA VACCINE  08/01/2023    COVID-19 Vaccine (6 - 2023-24 season) 09/01/2023    TDAP/TD VACCINES (2 - Td or Tdap) 11/06/2023    BMI FOLLOWUP  01/26/2025    MAMMOGRAM  04/10/2025    DXA SCAN  04/03/2026    COLORECTAL CANCER SCREENING  04/18/2026    Pneumococcal Vaccine 65+  Completed    HEPATITIS C SCREENING  Addressed                  CMS Preventative Services Quick Reference  Risk Factors Identified During Encounter  Immunizations Discussed/Encouraged: Influenza and Prevnar 20 (Pneumococcal 20-valent conjugate)  The above risks/problems have been discussed with the patient.  Pertinent information has been shared with the patient in the After Visit Summary.  An After Visit Summary and PPPS were made available to the patient.    Follow Up:   Next Medicare Wellness visit to be scheduled in 1 year.       Additional E&M Note during same encounter follows:  Patient has multiple medical problems which are significant and separately identifiable that require additional work above and beyond the Medicare Wellness Visit.      Chief Complaint  Medicare Wellness-subsequent    Subjective        HPI  Mary Cueva is also being seen today for:    Hypertension - stable today in the office.  Patient taking medication as prescribed. Patient is taking amlodipine, triamterene-HCTZ.  Denies side effects of the medication.     HLD- Trying to adhere to a balanced diet.  Due for recheck.     Anxiety disorder-stable.  Patient is taking venlafaxine XR.  The patient is not having any side effects.  Denies depression.      Current Outpatient Medications:     amLODIPine (NORVASC) 5 MG tablet, Take 1 tablet by mouth Daily., Disp: 90 tablet, Rfl: 4    fluticasone (FLONASE) 50 MCG/ACT nasal spray, 2 sprays into the nostril(s) as directed by provider Daily., Disp: 48 g, Rfl: 4    montelukast  "(SINGULAIR) 10 MG tablet, Take 1 tablet by mouth Every Night., Disp: 90 tablet, Rfl: 4    Multiple Vitamins-Minerals (EYE VITAMINS PO), Take  by mouth. OTC RECOMMENDED BY OPTOMETRIST DUE TO MACULAR DENGENERATION, Disp: , Rfl:     oxybutynin (DITROPAN) 5 MG tablet, Take 1 tablet by mouth Daily., Disp: , Rfl:     triamterene-hydrochlorothiazide (MAXZIDE-25) 37.5-25 MG per tablet, Take 1 tablet by mouth Daily., Disp: 90 tablet, Rfl: 4    venlafaxine XR (EFFEXOR-XR) 150 MG 24 hr capsule, Take 1 capsule by mouth Daily., Disp: 90 capsule, Rfl: 4         Objective   Vital Signs:  /80 (BP Location: Right arm, Patient Position: Sitting, Cuff Size: Small Adult)   Pulse 78   Resp 18   Ht 157.5 cm (62\")   Wt 78.9 kg (174 lb)   SpO2 98%   BMI 31.83 kg/m²     Physical Exam  Vitals and nursing note reviewed.   Constitutional:       General: She is not in acute distress.     Appearance: Normal appearance.   Cardiovascular:      Rate and Rhythm: Normal rate and regular rhythm.      Heart sounds: Normal heart sounds. No murmur heard.  Pulmonary:      Effort: Pulmonary effort is normal.      Breath sounds: Normal breath sounds.   Neurological:      Mental Status: She is alert.          The following data was reviewed by: Lamonte Christopher MD on 01/26/2024:  Common labs          5/19/2023    14:08   Common Labs   Glucose 104    BUN 16    Creatinine 0.88    Sodium 141    Potassium 4.2    Chloride 99    Calcium 10.0    Total Protein 7.4    Albumin 4.6    Total Bilirubin 0.5    Alkaline Phosphatase 98    AST (SGOT) 17    ALT (SGPT) 16    WBC 13.65    Hemoglobin 15.8    Hematocrit 45.8    Platelets 345    Total Cholesterol 212    Triglycerides 104    HDL Cholesterol 73    LDL Cholesterol  121                 Assessment and Plan   Diagnoses and all orders for this visit:    1. Medicare annual wellness visit, subsequent (Primary)    2. Benign essential hypertension  -     CBC (No Diff)  -     Comprehensive Metabolic Panel  -     " triamterene-hydrochlorothiazide (MAXZIDE-25) 37.5-25 MG per tablet; Take 1 tablet by mouth Daily.  Dispense: 90 tablet; Refill: 4  -     amLODIPine (NORVASC) 5 MG tablet; Take 1 tablet by mouth Daily.  Dispense: 90 tablet; Refill: 4    3. Dyslipidemia  -     CBC (No Diff)  -     Comprehensive Metabolic Panel  -     Lipid Panel With LDL / HDL Ratio    4. Generalized anxiety disorder    5. Overactive bladder  -     oxybutynin (DITROPAN) 5 MG tablet; Take 1 tablet by mouth Daily.    6. Non-seasonal allergic rhinitis due to pollen  -     montelukast (SINGULAIR) 10 MG tablet; Take 1 tablet by mouth Every Night.  Dispense: 90 tablet; Refill: 4        Clinically stable chronic conditions as above.  Continue all medications as above.  Labs reviewed/ordered as above.           Follow Up   Return in about 6 months (around 7/26/2024) for Next scheduled follow up.  Patient was given instructions and counseling regarding her condition or for health maintenance advice. Please see specific information pulled into the AVS if appropriate.

## 2024-04-09 DIAGNOSIS — N32.81 OVERACTIVE BLADDER: ICD-10-CM

## 2024-04-10 RX ORDER — OXYBUTYNIN CHLORIDE 5 MG/1
5 TABLET ORAL 3 TIMES DAILY
Qty: 90 TABLET | Refills: 1 | Status: SHIPPED | OUTPATIENT
Start: 2024-04-10

## 2024-04-30 ENCOUNTER — APPOINTMENT (OUTPATIENT)
Dept: WOMENS IMAGING | Facility: HOSPITAL | Age: 74
End: 2024-04-30
Payer: MEDICARE

## 2024-04-30 PROCEDURE — 77065 DX MAMMO INCL CAD UNI: CPT | Performed by: RADIOLOGY

## 2024-04-30 PROCEDURE — G0279 TOMOSYNTHESIS, MAMMO: HCPCS | Performed by: RADIOLOGY

## 2024-05-29 ENCOUNTER — LAB REQUISITION (OUTPATIENT)
Dept: LAB | Facility: HOSPITAL | Age: 74
End: 2024-05-29
Payer: MEDICARE

## 2024-05-29 ENCOUNTER — APPOINTMENT (OUTPATIENT)
Dept: WOMENS IMAGING | Facility: HOSPITAL | Age: 74
End: 2024-05-29
Payer: MEDICARE

## 2024-05-29 ENCOUNTER — HOSPITAL ENCOUNTER (OUTPATIENT)
Dept: MAMMOGRAPHY | Facility: HOSPITAL | Age: 74
Discharge: HOME | End: 2024-05-29
Payer: MEDICARE

## 2024-05-29 DIAGNOSIS — R92.1 CALCIFICATION OF RIGHT BREAST: ICD-10-CM

## 2024-05-29 DIAGNOSIS — N63.0 UNSPECIFIED LUMP IN UNSPECIFIED BREAST: ICD-10-CM

## 2024-05-29 PROCEDURE — 88360 TUMOR IMMUNOHISTOCHEM/MANUAL: CPT | Performed by: OBSTETRICS & GYNECOLOGY

## 2024-05-29 PROCEDURE — 88342 IMHCHEM/IMCYTCHM 1ST ANTB: CPT | Performed by: OBSTETRICS & GYNECOLOGY

## 2024-05-29 PROCEDURE — 88305 TISSUE EXAM BY PATHOLOGIST: CPT | Performed by: OBSTETRICS & GYNECOLOGY

## 2024-05-29 PROCEDURE — 19081 BX BREAST 1ST LESION STRTCTC: CPT | Performed by: RADIOLOGY

## 2024-05-29 PROCEDURE — C1819 TISSUE LOCALIZATION-EXCISION: HCPCS | Performed by: RADIOLOGY

## 2024-05-29 PROCEDURE — A4648 IMPLANTABLE TISSUE MARKER: HCPCS | Performed by: RADIOLOGY

## 2024-05-29 PROCEDURE — 76098 X-RAY EXAM SURGICAL SPECIMEN: CPT

## 2024-05-29 PROCEDURE — 88341 IMHCHEM/IMCYTCHM EA ADD ANTB: CPT | Performed by: OBSTETRICS & GYNECOLOGY

## 2024-05-31 LAB
DX PRELIMINARY: NORMAL
LAB AP CASE REPORT: NORMAL
LAB AP CLINICAL INFORMATION: NORMAL
LAB AP DIAGNOSIS COMMENT: NORMAL
LAB AP SPECIAL STAINS: NORMAL
PATH REPORT.FINAL DX SPEC: NORMAL
PATH REPORT.GROSS SPEC: NORMAL

## 2024-06-07 ENCOUNTER — TELEPHONE (OUTPATIENT)
Dept: SURGERY | Facility: CLINIC | Age: 74
End: 2024-06-07
Payer: MEDICARE

## 2024-06-07 NOTE — TELEPHONE ENCOUNTER
Called and left message to go over breast intake questions. If patient calls back HUB please transfer to office

## 2024-06-12 ENCOUNTER — OFFICE VISIT (OUTPATIENT)
Dept: SURGERY | Facility: CLINIC | Age: 74
End: 2024-06-12
Payer: MEDICARE

## 2024-06-12 VITALS
BODY MASS INDEX: 30.91 KG/M2 | WEIGHT: 168 LBS | HEIGHT: 62 IN | DIASTOLIC BLOOD PRESSURE: 78 MMHG | SYSTOLIC BLOOD PRESSURE: 114 MMHG

## 2024-06-12 DIAGNOSIS — Z91.89 AT HIGH RISK FOR BREAST CANCER: ICD-10-CM

## 2024-06-12 DIAGNOSIS — N60.99 ATYPICAL DUCTAL HYPERPLASIA OF BREAST: Primary | ICD-10-CM

## 2024-06-12 DIAGNOSIS — R92.2 INCONCLUSIVE MAMMOGRAM: ICD-10-CM

## 2024-06-12 PROCEDURE — 1159F MED LIST DOCD IN RCRD: CPT | Performed by: STUDENT IN AN ORGANIZED HEALTH CARE EDUCATION/TRAINING PROGRAM

## 2024-06-12 PROCEDURE — 3078F DIAST BP <80 MM HG: CPT | Performed by: STUDENT IN AN ORGANIZED HEALTH CARE EDUCATION/TRAINING PROGRAM

## 2024-06-12 PROCEDURE — 1160F RVW MEDS BY RX/DR IN RCRD: CPT | Performed by: STUDENT IN AN ORGANIZED HEALTH CARE EDUCATION/TRAINING PROGRAM

## 2024-06-12 PROCEDURE — 99024 POSTOP FOLLOW-UP VISIT: CPT | Performed by: STUDENT IN AN ORGANIZED HEALTH CARE EDUCATION/TRAINING PROGRAM

## 2024-06-12 PROCEDURE — 3074F SYST BP LT 130 MM HG: CPT | Performed by: STUDENT IN AN ORGANIZED HEALTH CARE EDUCATION/TRAINING PROGRAM

## 2024-06-12 RX ORDER — ACETAMINOPHEN 500 MG
500 TABLET ORAL EVERY 6 HOURS PRN
COMMUNITY

## 2024-06-12 RX ORDER — TRIAMCINOLONE ACETONIDE 1 MG/G
CREAM TOPICAL
COMMUNITY
Start: 2024-03-02

## 2024-06-12 NOTE — PROGRESS NOTES
GENERAL SURGERY BENIGN BREAST HISTORY AND PHYSICAL     SUMMARY:  Mary Cueva is a 74 y.o. lady with:  A new diagnosis of breast atypical ductal hyperplasia.  -Surgical plan: Discussed recommendation for right breast wire localized excisional biopsy pending MRI results.. Risks (including bleeding, infection, damage to surrounding structures, need for additional surgery), benefits and alternatives were discussed with the patient who agreed and wished to proceed. Risk of pathologic upgrade was also discussed with possible need for additional treatment.     High risk screening:   -Norma Morales lifetime breast cancer risk: 7%. -This patient does qualify to be referred to medical oncology postoperatively pending final pathology for evaluation for prophylactic endocrine therapy due to high risk for breast cancer.    Referring Provider: No ref. provider found    Chief complaint: abnormal breast imaging    HPI: Ms. Mary Cueva is seen at the request of No ref. provider found. The patient is a 74 y.o. woman being seen for a new diagnosis of right breast atypical ductal hyperplasia.      This was initially detected as an imaging abnormality on routine screening. Her work-up is detailed in the breast history section below. She has had regular annual mammograms. She denies any prior history of abnormal mammograms or breast biopsies. She denies any breast lumps, pain, skin changes, or nipple discharge.    She has a family history of breast cancer in her maternal aunt (late in life) and her daughter (50's). She denies any family history of ovarian cancer.     TIMELINE OF WORKUP:  4/23/2024 bilateral screening mammogram:  There are scattered areas of fibroglandular density.  There are calcifications seen in the posterior one third upper outer region of the right breast.  Requires additional evaluation.  BI-RADS Category 0    4/30/2024 right breast diagnostic mammogram:  There are scattered areas of fibroglandular density.  On  present exam there are multiple amorphous and indistinct calcifications measuring 6 mm in segmental distribution in the posterior one third region of the right breast at 10:00 9 cm from the nipple.  Suspicious.  Stereotactic biopsy is recommended.  BI-RADS Category 4A    2024 right breast stereotactic biopsy:  The right breast at 10:00 was isolated and the calcifications were localized.  8 core specimens were obtained.  A Top-Hat clip was placed.  Clip was in the expected position and confirmed partial removal of the lesion.  Pathology returned as ADH which is high risk and concordant.    2024 Pathology:   Final Diagnosis   1.  Right breast at 10:00, (Top-Hat clip-for calcifications), stereotactic core biopsy:               A.  Scattered foci of low grade atypical ductal hyperplasia (ADH) with focal microcalcification.               B.  No definitive malignancy identified (see comment).         MEDICAL HISTORY:   Gynecologic History:   . P:0 AB:0  Age at first childbirth: N/A  Lactation/How long: N/A  Age at menarche: 12  Age at menopause: 50  Total years of oral contraceptive use: previously   Total years of hormone replacement therapy: none    Past Medical History:   HTN    Past Surgical History:    Appendectomy   Cholecystectomy   Ovarian cyst removal   Tonsillectomy   C section     Family History:    As above    Social History:   Denies tobacco use  Occasional alcohol use    Allergies:   No Known Allergies    Medications:     Current Outpatient Medications:     acetaminophen (TYLENOL) 500 MG tablet, Take 1 tablet by mouth Every 6 (Six) Hours As Needed for Mild Pain., Disp: , Rfl:     amLODIPine (NORVASC) 5 MG tablet, Take 1 tablet by mouth Daily., Disp: 90 tablet, Rfl: 4    fluticasone (FLONASE) 50 MCG/ACT nasal spray, 2 sprays into the nostril(s) as directed by provider Daily., Disp: 48 g, Rfl: 4    montelukast (SINGULAIR) 10 MG tablet, Take 1 tablet by mouth Every Night., Disp: 90 tablet, Rfl: 4     Multiple Vitamins-Minerals (EYE VITAMINS PO), Take  by mouth. OTC RECOMMENDED BY OPTOMETRIST DUE TO MACULAR DENGENERATION, Disp: , Rfl:     oxybutynin (DITROPAN) 5 MG tablet, TAKE 1 TABLET BY MOUTH THREE TIMES DAILY, Disp: 90 tablet, Rfl: 1    triamcinolone (KENALOG) 0.1 % cream, APPLY TO TRUNK ONCE DAILY AS NEEDED., Disp: , Rfl:     triamterene-hydrochlorothiazide (MAXZIDE-25) 37.5-25 MG per tablet, Take 1 tablet by mouth Daily., Disp: 90 tablet, Rfl: 4    venlafaxine XR (EFFEXOR-XR) 150 MG 24 hr capsule, Take 1 capsule by mouth Daily., Disp: 90 capsule, Rfl: 4    Labs:    Labs from 1/26/2024 personally reviewed by me     ROS:   Influenza-like illness: no fever, no  cough, no  sore throat, no  body aches, no loss of sense of taste or smell, no known exposure to person with Covid-19.  Constitutional: Negative for fevers or chills  HENT: Negative for hearing loss or runny nose  Eyes: Negative for vision changes or scleral icterus  Respiratory: Negative for cough or shortness of breath  Cardiovascular: Negative for chest pain or heart palpitations  Gastrointestinal: Negative for abdominal pain, nausea, vomiting, constipation, melena, or hematochezia  Genitourinary: Negative for hematuria or dysuria  Musculoskeletal: Negative for joint swelling or gait instability  Neurologic: Negative for tremors or seizures  Psychiatric: Negative for suicidal ideations or depression  All other systems reviewed and negative    PHYSICAL EXAM:   Constitutional: Well-developed well-nourished, no acute distress  Eyes: Conjunctiva normal, sclera nonicteric  ENMT: Hearing grossly normal, oral mucosa moist  Neck: Supple, no palpable mass, trachea midline  Respiratory: Clear to auscultation, normal inspiratory effort  Cardiovascular: Regular rate, no murmur, no peripheral edema, no jugular venous distention  Breast: symmetric  Right: No visible abnormalities on inspection while seated, with arms raised or hands on hips. No masses, skin  changes, or nipple abnormalities.  Left:  No visible abnormalities on inspection while seated, with arms raised or hands on hips. No masses, skin changes, or nipple abnormalities.  Biopsy site appreciated in right breast, otherwise no skin changes.   No clinical chest wall involvement.  Gastrointestinal: Soft, nontender  Lymphatics (palpable nodes): No cervical, supraclavicular or axillary lymphadenopathy  Skin:  Warm, dry, no rash on visualized skin surfaces  Musculoskeletal: Symmetric strength, normal gait  Psychiatric: Alert and oriented ×3, normal affect             SHANEL SHELTON M.D.  General and Endoscopic Surgery  [facility]    [address], [address]  [place], [place], [address]  P: [phone]  F: [phone]

## 2024-06-25 ENCOUNTER — PREP FOR SURGERY (OUTPATIENT)
Dept: OTHER | Facility: HOSPITAL | Age: 74
End: 2024-06-25
Payer: MEDICARE

## 2024-06-25 ENCOUNTER — TELEPHONE (OUTPATIENT)
Dept: SURGERY | Facility: CLINIC | Age: 74
End: 2024-06-25
Payer: MEDICARE

## 2024-06-25 DIAGNOSIS — N60.99 ATYPICAL DUCTAL HYPERPLASIA OF BREAST: Primary | ICD-10-CM

## 2024-06-25 NOTE — TELEPHONE ENCOUNTER
----- Message from Shelbie Morales sent at 6/25/2024 11:22 AM EDT -----  Regarding: surg scheduling  Can you schedule for R breast wire loc ex bx for 7/11 or 7/12?

## 2024-07-03 ENCOUNTER — HOSPITAL ENCOUNTER (OUTPATIENT)
Dept: MRI IMAGING | Facility: HOSPITAL | Age: 74
Discharge: HOME | End: 2024-07-03
Admitting: STUDENT IN AN ORGANIZED HEALTH CARE EDUCATION/TRAINING PROGRAM
Payer: MEDICARE

## 2024-07-03 DIAGNOSIS — N60.99 ATYPICAL DUCTAL HYPERPLASIA OF BREAST: ICD-10-CM

## 2024-07-03 DIAGNOSIS — R92.2 INCONCLUSIVE MAMMOGRAM: ICD-10-CM

## 2024-07-03 PROCEDURE — C8908 MRI W/O FOL W/CONT, BREAST,: HCPCS

## 2024-07-03 PROCEDURE — 0 GADOBENATE DIMEGLUMINE 529 MG/ML SOLUTION: Performed by: STUDENT IN AN ORGANIZED HEALTH CARE EDUCATION/TRAINING PROGRAM

## 2024-07-03 PROCEDURE — A9577 INJ MULTIHANCE: HCPCS | Performed by: STUDENT IN AN ORGANIZED HEALTH CARE EDUCATION/TRAINING PROGRAM

## 2024-07-03 PROCEDURE — C8937 CAD BREAST MRI: HCPCS

## 2024-07-03 RX ADMIN — GADOBENATE DIMEGLUMINE 15 ML: 529 INJECTION, SOLUTION INTRAVENOUS at 13:57

## 2024-07-08 ENCOUNTER — TELEPHONE (OUTPATIENT)
Dept: SURGERY | Facility: CLINIC | Age: 74
End: 2024-07-08
Payer: MEDICARE

## 2024-07-08 ENCOUNTER — PREP FOR SURGERY (OUTPATIENT)
Dept: OTHER | Facility: HOSPITAL | Age: 74
End: 2024-07-08
Payer: MEDICARE

## 2024-07-08 NOTE — TELEPHONE ENCOUNTER
Called regarding MRI results. No new findings. Recommend excision.     IMPRESSION AND RECOMMENDATION:     1.  A biopsy tract at 10-11 o'clock in the posterior right breast  represents the site of biopsy-proven atypia, for which surgical  management is recommended. Approximately 1.1 cm non-mass enhancement  within the inferior aspect of the tract is in the region of the  previously biopsied calcifications on mammogram and a suspicious. The  biopsy clip is felt to be superiorly displaced based on the MRI  appearance and postbiopsy mammogram. This is described above. Recommend  surgical management with preoperative localization targeting the biopsy  clip and area of interest, which has been marked on the postbiopsy  mammogram from 5/29/2024.  2.  No MRI evidence of malignancy in the left breast.     BI-RADS Category 4: Suspicious

## 2024-07-09 ENCOUNTER — TELEPHONE (OUTPATIENT)
Dept: SURGERY | Facility: CLINIC | Age: 74
End: 2024-07-09
Payer: MEDICARE

## 2024-07-09 NOTE — TELEPHONE ENCOUNTER
2nd attempt made to schedule surgery  Voicemail left for patient to return call to schedule surgery with Dr. Morales.

## 2024-07-10 PROBLEM — N60.99 ATYPICAL DUCTAL HYPERPLASIA OF BREAST: Status: ACTIVE | Noted: 2024-06-25

## 2024-07-24 ENCOUNTER — PRE-ADMISSION TESTING (OUTPATIENT)
Dept: PREADMISSION TESTING | Facility: HOSPITAL | Age: 74
End: 2024-07-24
Payer: MEDICARE

## 2024-07-24 VITALS
SYSTOLIC BLOOD PRESSURE: 153 MMHG | TEMPERATURE: 97 F | OXYGEN SATURATION: 95 % | WEIGHT: 172.3 LBS | BODY MASS INDEX: 30.53 KG/M2 | DIASTOLIC BLOOD PRESSURE: 86 MMHG | HEIGHT: 63 IN | RESPIRATION RATE: 18 BRPM | HEART RATE: 101 BPM

## 2024-07-24 LAB
ANION GAP SERPL CALCULATED.3IONS-SCNC: 11 MMOL/L (ref 5–15)
BASOPHILS # BLD AUTO: 0.05 10*3/MM3 (ref 0–0.2)
BASOPHILS NFR BLD AUTO: 0.5 % (ref 0–1.5)
BUN SERPL-MCNC: 15 MG/DL (ref 8–23)
BUN/CREAT SERPL: 17.2 (ref 7–25)
CALCIUM SPEC-SCNC: 9.5 MG/DL (ref 8.6–10.5)
CHLORIDE SERPL-SCNC: 101 MMOL/L (ref 98–107)
CO2 SERPL-SCNC: 26 MMOL/L (ref 22–29)
CREAT SERPL-MCNC: 0.87 MG/DL (ref 0.57–1)
DEPRECATED RDW RBC AUTO: 43.8 FL (ref 37–54)
EGFRCR SERPLBLD CKD-EPI 2021: 69.6 ML/MIN/1.73
EOSINOPHIL # BLD AUTO: 0.14 10*3/MM3 (ref 0–0.4)
EOSINOPHIL NFR BLD AUTO: 1.3 % (ref 0.3–6.2)
ERYTHROCYTE [DISTWIDTH] IN BLOOD BY AUTOMATED COUNT: 13 % (ref 12.3–15.4)
GLUCOSE SERPL-MCNC: 104 MG/DL (ref 65–99)
HCT VFR BLD AUTO: 47.5 % (ref 34–46.6)
HGB BLD-MCNC: 15.6 G/DL (ref 12–15.9)
IMM GRANULOCYTES # BLD AUTO: 0.05 10*3/MM3 (ref 0–0.05)
IMM GRANULOCYTES NFR BLD AUTO: 0.5 % (ref 0–0.5)
LYMPHOCYTES # BLD AUTO: 1.95 10*3/MM3 (ref 0.7–3.1)
LYMPHOCYTES NFR BLD AUTO: 17.8 % (ref 19.6–45.3)
MCH RBC QN AUTO: 30.1 PG (ref 26.6–33)
MCHC RBC AUTO-ENTMCNC: 32.8 G/DL (ref 31.5–35.7)
MCV RBC AUTO: 91.7 FL (ref 79–97)
MONOCYTES # BLD AUTO: 0.61 10*3/MM3 (ref 0.1–0.9)
MONOCYTES NFR BLD AUTO: 5.6 % (ref 5–12)
NEUTROPHILS NFR BLD AUTO: 74.3 % (ref 42.7–76)
NEUTROPHILS NFR BLD AUTO: 8.17 10*3/MM3 (ref 1.7–7)
NRBC BLD AUTO-RTO: 0 /100 WBC (ref 0–0.2)
PLATELET # BLD AUTO: 318 10*3/MM3 (ref 140–450)
PMV BLD AUTO: 10.3 FL (ref 6–12)
POTASSIUM SERPL-SCNC: 3.5 MMOL/L (ref 3.5–5.2)
QT INTERVAL: 341 MS
QTC INTERVAL: 425 MS
RBC # BLD AUTO: 5.18 10*6/MM3 (ref 3.77–5.28)
SODIUM SERPL-SCNC: 138 MMOL/L (ref 136–145)
WBC NRBC COR # BLD AUTO: 10.97 10*3/MM3 (ref 3.4–10.8)

## 2024-07-24 PROCEDURE — 93005 ELECTROCARDIOGRAM TRACING: CPT

## 2024-07-24 PROCEDURE — 80048 BASIC METABOLIC PNL TOTAL CA: CPT

## 2024-07-24 PROCEDURE — 85025 COMPLETE CBC W/AUTO DIFF WBC: CPT

## 2024-07-24 PROCEDURE — 36415 COLL VENOUS BLD VENIPUNCTURE: CPT

## 2024-07-24 NOTE — DISCHARGE INSTRUCTIONS
Take the following medications the morning of surgery:  VENLAFAXINE, AMLODIPINE      If you are on prescription narcotic pain medication to control your pain you may also take that medication the morning of surgery.    General Instructions:  Do not eat solid food after midnight the night before surgery.  You may drink clear liquids day of surgery but must stop at least one hour before your hospital arrival time.  It is beneficial for you to have a clear drink that contains carbohydrates the day of surgery.  We suggest a 12 to 20 ounce bottle of Gatorade or Powerade for non-diabetic patients or a 12 to 20 ounce bottle of G2 or Powerade Zero for diabetic patients. (Pediatric patients, are not advised to drink a 12 to 20 ounce carbohydrate drink)    Clear liquids are liquids you can see through.  Nothing red in color.     Plain water                                  Sports drinks  Sodas                                   Gelatin (Jell-O)  Fruit juices without pulp such as white grape juice and apple juice  Popsicles that contain no fruit or yogurt  Tea or coffee (no cream or milk added)  Gatorade / Powerade  G2 / Powerade Zero    Chicken / beef / pork / vegetable bullion / cubes or any formulation are not considered clear liquids and are not allowed.    Infants may have breast milk up to four hours before surgery.  Infants drinking formula may drink formula up to six hours before surgery.   Patients who avoid smoking, chewing tobacco and alcohol for 4 weeks prior to surgery have a reduced risk of post-operative complications.  Quit smoking as many days before surgery as you can.  Do not smoke, use chewing tobacco or drink alcohol the day of surgery.   If applicable bring your C-PAP/ BI-PAP machine in with you to preop day of surgery.  Bring any papers given to you in the doctor’s office.  Wear clean comfortable clothes.  Do not wear contact lenses, false eyelashes or make-up.  Bring a case for your glasses.   Bring  crutches or walker if applicable.  Remove all piercings.  Leave jewelry and any other valuables at home.  Hair extensions with metal clips must be removed prior to surgery.  The Pre-Admission Testing nurse will instruct you to bring medications if unable to obtain an accurate list in Pre-Admission Testing.        If you were given a blood bank ID arm band remember to bring it with you the day of surgery.    Preventing a Surgical Site Infection:  For 2 to 3 days before surgery, avoid shaving with a razor because the razor can irritate skin and make it easier to develop an infection.    Any areas of open skin can increase the risk of a post-operative wound infection by allowing bacteria to enter and travel throughout the body.  Notify your surgeon if you have any skin wounds / rashes even if it is not near the expected surgical site.  The area will need assessed to determine if surgery should be delayed until it is healed.  The night prior to surgery shower using a fresh bar of anti-bacterial soap (such as Dial) and clean washcloth.  Sleep in a clean bed with clean clothing.  Do not allow pets to sleep with you.  Shower on the morning of surgery using a fresh bar of anti-bacterial soap (such as Dial) and clean washcloth.  Dry with a clean towel and dress in clean clothing.  Ask your surgeon if you will be receiving antibiotics prior to surgery.  Make sure you, your family, and all healthcare providers clean their hands with soap and water or an alcohol based hand  before caring for you or your wound.    CHLORHEXIDINE CLOTH INSTRUCTIONS  The morning of surgery follow these instructions using the Chlorhexidine cloths you've been given.  These steps reduce bacteria on the body.  Do not use the cloths near your eyes, ears mouth, genitalia or on open wounds.  Throw the cloths away after use but do not try to flush them down a toilet.      Open and remove one cloth at a time from the package.    Leave the cloth  unfolded and begin the bathing.  Massage the skin with the cloths using gentle pressure to remove bacteria.  Do not scrub harshly.   Follow the steps below with one 2% CHG cloth per area (6 total cloths).  One cloth for neck, shoulders and chest.  One cloth for both arms, hands, fingers and underarms (do underarms last).  One cloth for the abdomen followed by groin.  One cloth for right leg and foot including between the toes.  One cloth for left leg and foot including between the toes.  The last cloth is to be used for the back of the neck, back and buttocks.    Allow the CHG to air dry 3 minutes on the skin which will give it time to work and decrease the chance of irritation.  The skin may feel sticky until it is dry.  Do not rinse with water or any other liquid or you will lose the beneficial effects of the CHG.  If mild skin irritation occurs, do rinse the skin to remove the CHG.  Report this to the nurse at time of admission.  Do not apply lotions, creams, ointments, deodorants or perfumes after using the clothes. Dress in clean clothes before coming to the hospital.      Day of surgery:  Your arrival time is approximately two hours before your scheduled surgery time.  Upon arrival, a Pre-op nurse and Anesthesiologist will review your health history, obtain vital signs, and answer questions you may have.  The only belongings needed at this time will be a list of your home medications and if applicable your C-PAP/BI-PAP machine.  A Pre-op nurse will start an IV and you may receive medication in preparation for surgery, including something to help you relax.     Please be aware that surgery does come with discomfort.  We want to make every effort to control your discomfort so please discuss any uncontrolled symptoms with your nurse.   Your doctor will most likely have prescribed pain medications.      If you are going home after surgery you will receive individualized written care instructions before being  discharged.  A responsible adult must drive you to and from the hospital on the day of your surgery and ideally stay with you through the night.   .  Discharge prescriptions can be filled by the hospital pharmacy during regular pharmacy hours.  If you are having surgery late in the day/evening your prescription may be e-prescribed to your pharmacy.  Please verify your pharmacy hours or chose a 24 hour pharmacy to avoid not having access to your prescription because your pharmacy has closed for the day.    If you are staying overnight following surgery, you will be transported to your hospital room following the recovery period.  [facility] has all private rooms.    If you have any questions please call Pre-Admission Testing at [phone].  Deductibles and co-payments are collected on the day of service. Please be prepared to pay the required co-pay, deductible or deposit on the day of service as defined by your plan.    Call your surgeon immediately if you experience any of the following symptoms:  Sore Throat  Shortness of Breath or difficulty breathing  Cough  Chills  Body soreness or muscle pain  Headache  Fever  New loss of taste or smell  Do not arrive for your surgery ill.  Your procedure will need to be rescheduled to another time.  You will need to call your physician before the day of surgery to avoid any unnecessary exposure to hospital staff as well as other patients.

## 2024-07-26 ENCOUNTER — OFFICE VISIT (OUTPATIENT)
Dept: INTERNAL MEDICINE | Facility: CLINIC | Age: 74
End: 2024-07-26
Payer: MEDICARE

## 2024-07-26 VITALS
HEART RATE: 83 BPM | OXYGEN SATURATION: 96 % | RESPIRATION RATE: 18 BRPM | SYSTOLIC BLOOD PRESSURE: 120 MMHG | BODY MASS INDEX: 30.48 KG/M2 | DIASTOLIC BLOOD PRESSURE: 80 MMHG | WEIGHT: 172 LBS | HEIGHT: 63 IN

## 2024-07-26 DIAGNOSIS — F41.1 GENERALIZED ANXIETY DISORDER: Chronic | ICD-10-CM

## 2024-07-26 DIAGNOSIS — E78.5 DYSLIPIDEMIA: Chronic | ICD-10-CM

## 2024-07-26 DIAGNOSIS — I10 BENIGN ESSENTIAL HYPERTENSION: Primary | Chronic | ICD-10-CM

## 2024-07-26 PROCEDURE — 3079F DIAST BP 80-89 MM HG: CPT | Performed by: FAMILY MEDICINE

## 2024-07-26 PROCEDURE — 1160F RVW MEDS BY RX/DR IN RCRD: CPT | Performed by: FAMILY MEDICINE

## 2024-07-26 PROCEDURE — 99214 OFFICE O/P EST MOD 30 MIN: CPT | Performed by: FAMILY MEDICINE

## 2024-07-26 PROCEDURE — 1125F AMNT PAIN NOTED PAIN PRSNT: CPT | Performed by: FAMILY MEDICINE

## 2024-07-26 PROCEDURE — G2211 COMPLEX E/M VISIT ADD ON: HCPCS | Performed by: FAMILY MEDICINE

## 2024-07-26 PROCEDURE — 1159F MED LIST DOCD IN RCRD: CPT | Performed by: FAMILY MEDICINE

## 2024-07-26 PROCEDURE — 3074F SYST BP LT 130 MM HG: CPT | Performed by: FAMILY MEDICINE

## 2024-07-26 RX ORDER — VENLAFAXINE HYDROCHLORIDE 150 MG/1
150 CAPSULE, EXTENDED RELEASE ORAL DAILY
Qty: 90 CAPSULE | Refills: 0 | Status: SHIPPED | OUTPATIENT
Start: 2024-07-26

## 2024-07-26 NOTE — PATIENT INSTRUCTIONS
"MyChart Tips:    [employer] can be a useful part of our patient care program and is a way for us to communicate lab results and for you to request refills.  Here is some information regarding the best way to use [employer] for communication.       Examples of medical issues that are APPROPRIATE for [employer]:  -Follow-up on problems we have already addressed in a visit such as home testing results, blood pressure readings, glucose readings  -Questions that can be answered with a simple \"yes\" or \"no\"  -Please keep communication concise and to the point.  All other types of communication should be held for an office visit.    Communication that is NOT APPROPRIATE for [employer]:  -New problems, serious problems or urgent problems.  Urgent matters should be addressed by phone for advice, in the office, urgent care or the ER.  -Requesting Paxlovid or Antibiotics: These types of problems require an evaluation unless your provider approved this previously.    -[employer] messages are not email.  Staff will check messages each weekday.  We strive for a 48-hour turnaround on messaging.    -[employer] is not for private issues.  Messages are received first by our office staff.    Please be mindful that sometimes it may take longer to receive a response on [employer].  Many times of the year we have high volumes of messages coming into physician inboxes.      Please also be mindful that [employer] messages become part of your permanent medical record.    We appreciate your respect for the limitations and boundaries of [employer].      Lab Results:  Please allow up to 7 business days for lab results to be sent through [employer] to you before contacting your provider.  Sometimes we are waiting for results to get back from the lab and also your provider needs time to analyze them thoroughly before making recommendations.  Also, providers may be out of the office on vacation.      While the internet has great resources, it is not a substitute for " interpreting lab results.

## 2024-07-26 NOTE — PROGRESS NOTES
Chief Complaint  Follow-up and Hypertension    Subjective        Mary Cueva presents to [facility]  History of Present Illness    Hypertension - stable today in the office.  Patient taking medication as prescribed. Patient is taking amlodipine, triamterene-HCTZ.  Denies side effects of the medication.     HLD- Trying to adhere to a balanced diet.    Doing well overall but based on labs from January 26, 2024.    Anxiety disorder-stable.  Patient is taking venlafaxine XR.  The patient is not having any side effects.  Denies depression.      Current Outpatient Medications:     acetaminophen (TYLENOL) 500 MG tablet, Take 1 tablet by mouth Every 6 (Six) Hours As Needed for Mild Pain., Disp: , Rfl:     amLODIPine (NORVASC) 5 MG tablet, Take 1 tablet by mouth Daily., Disp: 90 tablet, Rfl: 4    fluticasone (FLONASE) 50 MCG/ACT nasal spray, 2 sprays into the nostril(s) as directed by provider Daily. (Patient taking differently: 2 sprays into the nostril(s) as directed by provider As Needed for Allergies.), Disp: 48 g, Rfl: 4    montelukast (SINGULAIR) 10 MG tablet, Take 1 tablet by mouth Every Night., Disp: 90 tablet, Rfl: 4    Multiple Vitamins-Minerals (EYE VITAMINS PO), Take 1 tablet by mouth Daily. HOLD FOR SURGERY, Disp: , Rfl:     oxybutynin (DITROPAN) 5 MG tablet, TAKE 1 TABLET BY MOUTH THREE TIMES DAILY (Patient taking differently: Take 1 tablet by mouth Daily.), Disp: 90 tablet, Rfl: 1    triamcinolone (KENALOG) 0.1 % cream, Apply 1 Application topically to the appropriate area as directed As Needed for Irritation., Disp: , Rfl:     triamterene-hydrochlorothiazide (MAXZIDE-25) 37.5-25 MG per tablet, Take 1 tablet by mouth Daily., Disp: 90 tablet, Rfl: 4    venlafaxine XR (EFFEXOR-XR) 150 MG 24 hr capsule, Take 1 capsule by mouth Daily., Disp: 90 capsule, Rfl: 0      Objective   Vital Signs:  /80 (BP Location: Left arm, Patient Position: Sitting, Cuff Size: Small Adult)    "Pulse 83   Resp 18   Ht 160 cm (63\")   Wt 78 kg (172 lb)   SpO2 96%   BMI 30.47 kg/m²   Estimated body mass index is 30.47 kg/m² as calculated from the following:    Height as of this encounter: 160 cm (63\").    Weight as of this encounter: 78 kg (172 lb).               Physical Exam  Vitals and nursing note reviewed.   Constitutional:       General: She is not in acute distress.     Appearance: Normal appearance.   Cardiovascular:      Rate and Rhythm: Normal rate and regular rhythm.      Heart sounds: Normal heart sounds. No murmur heard.  Pulmonary:      Effort: Pulmonary effort is normal.      Breath sounds: Normal breath sounds.   Neurological:      Mental Status: She is alert.   Psychiatric:         Attention and Perception: Attention and perception normal.         Mood and Affect: Mood and affect normal.         Speech: Speech normal.         Behavior: Behavior normal. Behavior is cooperative.         Thought Content: Thought content normal.         Judgment: Judgment normal.        Result Review :    The following data was reviewed by: Lamonte Christopher MD on 07/26/2024:  Common labs          1/26/2024    11:16 7/24/2024    14:20   Common Labs   Glucose 95  104    BUN 20  15    Creatinine 0.83  0.87    Sodium 141  138    Potassium 3.7  3.5    Chloride 99  101    Calcium 10.4  9.5    Total Protein 7.4     Albumin 4.5     Total Bilirubin 0.5     Alkaline Phosphatase 89     AST (SGOT) 16     ALT (SGPT) 12     WBC 11.47  10.97    Hemoglobin 15.8  15.6    Hematocrit 45.9  47.5    Platelets 361  318    Total Cholesterol 212     Triglycerides 90     HDL Cholesterol 79     LDL Cholesterol  117                    Assessment and Plan       Diagnoses and all orders for this visit:    1. Benign essential hypertension (Primary)    2. Generalized anxiety disorder  -     venlafaxine XR (EFFEXOR-XR) 150 MG 24 hr capsule; Take 1 capsule by mouth Daily.  Dispense: 90 capsule; Refill: 0    3. Dyslipidemia      Clinically " stable chronic conditions as above.  Continue all medications as above.  Labs reviewed as above.           Follow Up     Return if symptoms worsen or fail to improve.  Patient was given instructions and counseling regarding her condition or for health maintenance advice. Please see specific information pulled into the AVS if appropriate.

## 2024-08-03 ENCOUNTER — HOSPITAL ENCOUNTER (OUTPATIENT)
Facility: HOSPITAL | Age: 74
Discharge: HOME | End: 2024-08-03
Attending: EMERGENCY MEDICINE
Payer: MEDICARE

## 2024-08-03 VITALS
TEMPERATURE: 98 F | BODY MASS INDEX: 30.12 KG/M2 | DIASTOLIC BLOOD PRESSURE: 78 MMHG | OXYGEN SATURATION: 95 % | HEIGHT: 63 IN | SYSTOLIC BLOOD PRESSURE: 144 MMHG | WEIGHT: 170 LBS | RESPIRATION RATE: 16 BRPM | HEART RATE: 113 BPM

## 2024-08-03 DIAGNOSIS — H00.033 EYELID ABSCESS, RIGHT: Primary | ICD-10-CM

## 2024-08-03 PROCEDURE — 99202 OFFICE O/P NEW SF 15 MIN: CPT | Performed by: EMERGENCY MEDICINE

## 2024-08-03 PROCEDURE — G0463 HOSPITAL OUTPT CLINIC VISIT: HCPCS | Performed by: EMERGENCY MEDICINE

## 2024-08-03 RX ORDER — AMOXICILLIN AND CLAVULANATE POTASSIUM 875; 125 MG/1; MG/1
1 TABLET, FILM COATED ORAL 2 TIMES DAILY
Qty: 14 TABLET | Refills: 0 | Status: SHIPPED | OUTPATIENT
Start: 2024-08-03 | End: 2024-08-10

## 2024-08-03 RX ORDER — OFLOXACIN 3 MG/ML
1 SOLUTION/ DROPS OPHTHALMIC 4 TIMES DAILY
Qty: 1 ML | Refills: 0 | Status: SHIPPED | OUTPATIENT
Start: 2024-08-03 | End: 2024-08-08

## 2024-08-03 NOTE — FSED PROVIDER NOTE
"Subjective   History of Present Illness  74-year-old female with a 2-day history of right eyelid lesion that is between the crease of the eyelid and the eyelashes so not on the lid next to the orbit itself.  It is slightly swollen and red and she wakes up in the morning sometimes with white dried discharge in her eyes and lashes.  No change in vision and no redness of the orbit itself and no drainage from the orbit area itself.  The lesion has stayed localized and there is no spreading of the redness towards her nose or up to her eyebrow or down to her lid or towards the lateral aspect.  She has been putting warm compresses on it occasionally.  It is uncomfortable.  She is scheduled for breast biopsy and removal this coming week.  She is concerned that this would delay that treatment and care.  No nausea or vomiting or diarrhea.  No fever or stiff or sore neck.  No other rash or headache or ear pain or jaw pain.  No chest pain abdominal pain or back pain.  No sore throat or cough and no runny nose or congestion.  Her left eye is unaffected.  She has been around very young children recently.  Patient notes that she has noticed no change in her vision at all.      Review of Systems    Past Medical History:   Diagnosis Date    Acute non-recurrent maxillary sinusitis 10/25/2016    Allergic     Anatomical narrow angle borderline glaucoma     Anxiety     Arthritis     Breast mass     RIGHT    Cholelithiasis     s/p cholecystectomy 2007    Depression     Diverticulosis     Headache     Hematuria     2004, \"-\" w/u by  - essential microscopic hematuria    Hemorrhoids     History of medical problems     C-Pap,    HL (hearing loss)     Hypertension     Irregular heart rate     Macular degeneration 04/04/2022    eye doctor    Menopause     MRSA (methicillin resistant Staphylococcus aureus) infection     Facial cellulitis    Sleep apnea     NOT COMPLIANT WITH CPAP- PATIENT TRYING TO LOSE WEIGHT TO AVOID CPAP USE       No " Known Allergies    Past Surgical History:   Procedure Laterality Date    ABDOMINAL SURGERY      FOR ENDOMETRIOSIS    APPENDECTOMY      BREAST BIOPSY  Right     SECTION      CHOLECYSTECTOMY      COLONOSCOPY N/A 2016    Dr. Sonia MD; nl    OVARIAN CYST REMOVAL  1988    TONSILLECTOMY      x2       Family History   Problem Relation Age of Onset    Coronary artery disease Mother     Diabetes Mother     Hypertension Mother     Anxiety disorder Mother     Arthritis Mother     Hearing loss Mother     Heart disease Mother     Arthritis Father     COPD Father     Hearing loss Father     Breast cancer Maternal Aunt     Breast cancer Cousin     Malig Hyperthermia Neg Hx        Social History     Socioeconomic History    Marital status:    Tobacco Use    Smoking status: Never    Smokeless tobacco: Never   Vaping Use    Vaping status: Never Used   Substance and Sexual Activity    Alcohol use: Yes     Alcohol/week: 2.0 standard drinks of alcohol     Comment: 1 glass of wine in one week    Drug use: Never    Sexual activity: Yes     Partners: Male     Birth control/protection: Post-menopausal           Objective   Physical Exam  Vitals and nursing note reviewed.   Constitutional:       Appearance: Normal appearance. She is normal weight.   HENT:      Head: Normocephalic.      Nose: Nose normal.      Mouth/Throat:      Mouth: Mucous membranes are moist.      Pharynx: Oropharynx is clear.   Eyes:      General: No scleral icterus.        Right eye: No discharge.         Left eye: No discharge.      Extraocular Movements: Extraocular movements intact.      Conjunctiva/sclera: Conjunctivae normal.      Pupils: Pupils are equal, round, and reactive to light.      Comments: Right eye normal visual acuity.  Patient and exam of the orbit is normal.  The lower lid and the lashes have a normal exam and the corner of medial lateral connections of the lids are normal.  There is no drainage from the orbital area itself.   The upper lid shows about midline and equal distance from the lids with the lashes and where the lid meets the skull is a domed red slightly tender soft swelling roughly 1 mm in height and width or less.  No evidence of drainage from this at the moment.  Rest of the lid is unaffected by this dome as the rest of the lid is normal color and without thickening anywhere else or tenderness.  Left eye globe, lids corners all look normal.  Visual acuity for patient is her normal according to her.   Cardiovascular:      Rate and Rhythm: Normal rate.      Comments: In room with me.  It is reported as 113 which I did not get.  Pulmonary:      Effort: Pulmonary effort is normal.   Musculoskeletal:         General: Normal range of motion.      Cervical back: Neck supple.   Skin:     General: Skin is warm and dry.      Capillary Refill: Capillary refill takes less than 2 seconds.      Comments: See eye exam   Neurological:      General: No focal deficit present.      Mental Status: She is alert and oriented to person, place, and time. Mental status is at baseline.   Psychiatric:         Mood and Affect: Mood normal.         Behavior: Behavior normal.         Thought Content: Thought content normal.         Judgment: Judgment normal.         Procedures           ED Course                                           Medical Decision Making  Differential diagnosis includes but not limited to small localized swelling likely with pus underneath as I think this is what showing up in her right eye in the morning is a dried white matter.  I think it is trying to drain through the night.  I think more warm compresses will help it completely open up and reduce the swelling and release the fluid.  I see and observe and examined that nothing is wrong with her orbit or the rest of her upper lid or lower lid on the right side and her left eye exam is completely normal.  I suspect more compresses protecting the globe and the soft tissue in that  area with antibiotic eyedrops and giving her antibiotics by mouth will help her resolve this although I think warm compresses that will make the most difference.    Warm compresses to your right eyelid as often as you possibly can will encourage the area on your eyelid to open up and drain and get it over with. Even after it has started to drain, continue the compresses to completely drain the area. Use the antibiotic eyedrops to protect your eye itself. Take the antibiotics to continue to protect the soft tissue of your lid and around your eye from infection. Call the surgery center to let them know what it going on so they can decide if they need to postpone your care a  few days. If you have any concerns, you are welcome return here otherwise follow-up with your primary care doctor later this week.      Problems Addressed:  Eyelid abscess, right: complicated acute illness or injury    Risk  Prescription drug management.        Final diagnoses:   Eyelid abscess, right       ED Disposition  ED Disposition       ED Disposition   Discharge    Condition   Stable    Comment   --               Lamonte Christopher MD  [address] [address]  [phone]      This week         Medication List        New Prescriptions      amoxicillin-clavulanate 875-125 MG per tablet  Commonly known as: AUGMENTIN  Take 1 tablet by mouth 2 (Two) Times a Day for 7 days.     ofloxacin 0.3 % ophthalmic solution  Commonly known as: Ocuflox  Administer 1 drop to both eyes 4 (Four) Times a Day for 5 days.            Changed      fluticasone 50 MCG/ACT nasal spray  Commonly known as: FLONASE  2 sprays into the nostril(s) as directed by provider Daily.  What changed:   when to take this  reasons to take this     oxybutynin 5 MG tablet  Commonly known as: DITROPAN  TAKE 1 TABLET BY MOUTH THREE TIMES DAILY  What changed: when to take this               Where to Get Your Medications        These medications were sent to  [address], [place] - [address] [facility] - [phone]  - [phone]   [address] [facility] [address]      Phone: [phone]   amoxicillin-clavulanate 875-125 MG per tablet  ofloxacin 0.3 % ophthalmic solution

## 2024-08-03 NOTE — DISCHARGE INSTRUCTIONS
Warm compresses to your right eyelid as often as you possibly can will encourage the area on your eyelid to open up and drain and get it over with. Even after it has started to drain, continue the compresses to completely drain the area. Use the antibiotic eyedrops to protect your eye itself. Take the antibiotics to continue to protect the soft tissue of your lid and around your eye from infection. Call the surgery center to let them know what it going on so they can decide if they need to postpone your care a  few days. If you have any concerns, you are welcome return here otherwise follow-up with your primary care doctor later this week.

## 2024-08-05 ENCOUNTER — TELEPHONE (OUTPATIENT)
Dept: SURGERY | Facility: CLINIC | Age: 74
End: 2024-08-05
Payer: MEDICARE

## 2024-08-05 NOTE — TELEPHONE ENCOUNTER
ADDENDUM @ 02:54 PM, 08/06/2024/KZH    Circling back to see if patient is OK for surgery scheduled for 08/08 or would you like to reschedule due to her eyelid abscess and current antibiotic regime?     Patient saw urgent care provider this past Saturday for an eye infection and received antibiotic eye drops and oral antibiotics.  She will still be under treatment regimen on date of her scheduled surgery of 08/08.  Patient would like to proceed with right breast NL excisional biopsy.  Will this situation with eye prevent surgery? She saw PAT nurse prior to eye infection so they were not aware of infection or medications.  Please advise.

## 2024-08-06 ENCOUNTER — TELEPHONE (OUTPATIENT)
Dept: SURGERY | Facility: CLINIC | Age: 74
End: 2024-08-06
Payer: MEDICARE

## 2024-08-06 NOTE — TELEPHONE ENCOUNTER
Left voice message on patient phone stating that Physician said it is OK to proceed with surgery in regards to eye abscess and current antibiotic regime.

## 2024-08-08 ENCOUNTER — APPOINTMENT (OUTPATIENT)
Dept: GENERAL RADIOLOGY | Facility: HOSPITAL | Age: 74
End: 2024-08-08
Payer: MEDICARE

## 2024-08-08 ENCOUNTER — TRANSCRIBE ORDERS (OUTPATIENT)
Dept: LAB | Facility: HOSPITAL | Age: 74
End: 2024-08-08
Payer: MEDICARE

## 2024-08-08 ENCOUNTER — HOSPITAL ENCOUNTER (OUTPATIENT)
Facility: HOSPITAL | Age: 74
Setting detail: HOSPITAL OUTPATIENT SURGERY
Discharge: HOME | End: 2024-08-08
Attending: STUDENT IN AN ORGANIZED HEALTH CARE EDUCATION/TRAINING PROGRAM | Admitting: STUDENT IN AN ORGANIZED HEALTH CARE EDUCATION/TRAINING PROGRAM
Payer: MEDICARE

## 2024-08-08 ENCOUNTER — HOSPITAL ENCOUNTER (OUTPATIENT)
Dept: MAMMOGRAPHY | Facility: HOSPITAL | Age: 74
Discharge: HOME | End: 2024-08-08
Payer: MEDICARE

## 2024-08-08 ENCOUNTER — ANCILLARY PROCEDURE (OUTPATIENT)
Dept: LAB | Facility: HOSPITAL | Age: 74
End: 2024-08-08
Payer: MEDICARE

## 2024-08-08 ENCOUNTER — ANESTHESIA EVENT (OUTPATIENT)
Dept: PERIOP | Facility: HOSPITAL | Age: 74
End: 2024-08-08
Payer: MEDICARE

## 2024-08-08 ENCOUNTER — ANESTHESIA (OUTPATIENT)
Dept: PERIOP | Facility: HOSPITAL | Age: 74
End: 2024-08-08
Payer: MEDICARE

## 2024-08-08 VITALS
BODY MASS INDEX: 30.12 KG/M2 | WEIGHT: 169.97 LBS | OXYGEN SATURATION: 100 % | RESPIRATION RATE: 16 BRPM | HEIGHT: 63 IN | SYSTOLIC BLOOD PRESSURE: 138 MMHG | DIASTOLIC BLOOD PRESSURE: 60 MMHG | TEMPERATURE: 97.5 F | HEART RATE: 78 BPM

## 2024-08-08 DIAGNOSIS — N60.99 ATYPICAL DUCTAL HYPERPLASIA OF BREAST: ICD-10-CM

## 2024-08-08 DIAGNOSIS — N60.91 ATYPICAL DUCTAL HYPERPLASIA OF RIGHT BREAST: Primary | ICD-10-CM

## 2024-08-08 DIAGNOSIS — N60.91 ATYPICAL DUCTAL HYPERPLASIA OF RIGHT BREAST: ICD-10-CM

## 2024-08-08 DIAGNOSIS — N60.99 ATYPICAL DUCTAL HYPERPLASIA OF BREAST: Primary | ICD-10-CM

## 2024-08-08 PROCEDURE — 25010000002 PROPOFOL 200 MG/20ML EMULSION: Performed by: NURSE ANESTHETIST, CERTIFIED REGISTERED

## 2024-08-08 PROCEDURE — 25810000003 LACTATED RINGERS PER 1000 ML: Performed by: ANESTHESIOLOGY

## 2024-08-08 PROCEDURE — 25010000002 LIDOCAINE 1 % SOLUTION: Performed by: STUDENT IN AN ORGANIZED HEALTH CARE EDUCATION/TRAINING PROGRAM

## 2024-08-08 PROCEDURE — 19125 EXCISION BREAST LESION: CPT | Performed by: STUDENT IN AN ORGANIZED HEALTH CARE EDUCATION/TRAINING PROGRAM

## 2024-08-08 PROCEDURE — 76098 X-RAY EXAM SURGICAL SPECIMEN: CPT

## 2024-08-08 PROCEDURE — C1819 TISSUE LOCALIZATION-EXCISION: HCPCS

## 2024-08-08 PROCEDURE — 25010000002 CEFAZOLIN PER 500 MG: Performed by: STUDENT IN AN ORGANIZED HEALTH CARE EDUCATION/TRAINING PROGRAM

## 2024-08-08 PROCEDURE — 25010000002 PROPOFOL 10 MG/ML EMULSION: Performed by: NURSE ANESTHETIST, CERTIFIED REGISTERED

## 2024-08-08 PROCEDURE — 88307 TISSUE EXAM BY PATHOLOGIST: CPT | Performed by: STUDENT IN AN ORGANIZED HEALTH CARE EDUCATION/TRAINING PROGRAM

## 2024-08-08 RX ORDER — TRAMADOL HYDROCHLORIDE 50 MG/1
50 TABLET ORAL EVERY 6 HOURS PRN
Qty: 8 TABLET | Refills: 0 | Status: SHIPPED | OUTPATIENT
Start: 2024-08-08 | End: 2025-08-08

## 2024-08-08 RX ORDER — NALOXONE HCL 0.4 MG/ML
0.2 VIAL (ML) INJECTION AS NEEDED
Status: DISCONTINUED | OUTPATIENT
Start: 2024-08-08 | End: 2024-08-08 | Stop reason: HOSPADM

## 2024-08-08 RX ORDER — FENTANYL CITRATE 50 UG/ML
50 INJECTION, SOLUTION INTRAMUSCULAR; INTRAVENOUS ONCE AS NEEDED
Status: DISCONTINUED | OUTPATIENT
Start: 2024-08-08 | End: 2024-08-08 | Stop reason: HOSPADM

## 2024-08-08 RX ORDER — FAMOTIDINE 10 MG/ML
20 INJECTION, SOLUTION INTRAVENOUS ONCE
Status: COMPLETED | OUTPATIENT
Start: 2024-08-08 | End: 2024-08-08

## 2024-08-08 RX ORDER — SODIUM CHLORIDE, SODIUM LACTATE, POTASSIUM CHLORIDE, CALCIUM CHLORIDE 600; 310; 30; 20 MG/100ML; MG/100ML; MG/100ML; MG/100ML
9 INJECTION, SOLUTION INTRAVENOUS CONTINUOUS
Status: DISCONTINUED | OUTPATIENT
Start: 2024-08-08 | End: 2024-08-08 | Stop reason: HOSPADM

## 2024-08-08 RX ORDER — MIDAZOLAM HYDROCHLORIDE 1 MG/ML
0.5 INJECTION INTRAMUSCULAR; INTRAVENOUS
Status: DISCONTINUED | OUTPATIENT
Start: 2024-08-08 | End: 2024-08-08 | Stop reason: HOSPADM

## 2024-08-08 RX ORDER — DROPERIDOL 2.5 MG/ML
0.62 INJECTION, SOLUTION INTRAMUSCULAR; INTRAVENOUS
Status: DISCONTINUED | OUTPATIENT
Start: 2024-08-08 | End: 2024-08-08 | Stop reason: HOSPADM

## 2024-08-08 RX ORDER — IPRATROPIUM BROMIDE AND ALBUTEROL SULFATE 2.5; .5 MG/3ML; MG/3ML
3 SOLUTION RESPIRATORY (INHALATION) ONCE AS NEEDED
Status: DISCONTINUED | OUTPATIENT
Start: 2024-08-08 | End: 2024-08-08 | Stop reason: HOSPADM

## 2024-08-08 RX ORDER — SODIUM CHLORIDE 0.9 % (FLUSH) 0.9 %
3 SYRINGE (ML) INJECTION EVERY 12 HOURS SCHEDULED
Status: DISCONTINUED | OUTPATIENT
Start: 2024-08-08 | End: 2024-08-08 | Stop reason: HOSPADM

## 2024-08-08 RX ORDER — HYDROCODONE BITARTRATE AND ACETAMINOPHEN 5; 325 MG/1; MG/1
1 TABLET ORAL ONCE AS NEEDED
Status: DISCONTINUED | OUTPATIENT
Start: 2024-08-08 | End: 2024-08-08 | Stop reason: HOSPADM

## 2024-08-08 RX ORDER — LIDOCAINE HYDROCHLORIDE 10 MG/ML
1 INJECTION, SOLUTION INFILTRATION; PERINEURAL ONCE
Status: COMPLETED | OUTPATIENT
Start: 2024-08-08 | End: 2024-08-08

## 2024-08-08 RX ORDER — DIPHENHYDRAMINE HYDROCHLORIDE 50 MG/ML
12.5 INJECTION INTRAMUSCULAR; INTRAVENOUS
Status: DISCONTINUED | OUTPATIENT
Start: 2024-08-08 | End: 2024-08-08 | Stop reason: HOSPADM

## 2024-08-08 RX ORDER — PROMETHAZINE HYDROCHLORIDE 25 MG/1
25 SUPPOSITORY RECTAL ONCE AS NEEDED
Status: DISCONTINUED | OUTPATIENT
Start: 2024-08-08 | End: 2024-08-08 | Stop reason: HOSPADM

## 2024-08-08 RX ORDER — LIDOCAINE HYDROCHLORIDE 10 MG/ML
0.5 INJECTION, SOLUTION INFILTRATION; PERINEURAL ONCE AS NEEDED
Status: DISCONTINUED | OUTPATIENT
Start: 2024-08-08 | End: 2024-08-08 | Stop reason: HOSPADM

## 2024-08-08 RX ORDER — FLUMAZENIL 0.1 MG/ML
0.2 INJECTION INTRAVENOUS AS NEEDED
Status: DISCONTINUED | OUTPATIENT
Start: 2024-08-08 | End: 2024-08-08 | Stop reason: HOSPADM

## 2024-08-08 RX ORDER — LABETALOL HYDROCHLORIDE 5 MG/ML
5 INJECTION, SOLUTION INTRAVENOUS
Status: DISCONTINUED | OUTPATIENT
Start: 2024-08-08 | End: 2024-08-08 | Stop reason: HOSPADM

## 2024-08-08 RX ORDER — BUPIVACAINE HYDROCHLORIDE AND EPINEPHRINE 5; 5 MG/ML; UG/ML
INJECTION, SOLUTION PERINEURAL AS NEEDED
Status: DISCONTINUED | OUTPATIENT
Start: 2024-08-08 | End: 2024-08-08 | Stop reason: HOSPADM

## 2024-08-08 RX ORDER — PROPOFOL 10 MG/ML
INJECTION, EMULSION INTRAVENOUS AS NEEDED
Status: DISCONTINUED | OUTPATIENT
Start: 2024-08-08 | End: 2024-08-08 | Stop reason: SURG

## 2024-08-08 RX ORDER — SODIUM CHLORIDE 0.9 % (FLUSH) 0.9 %
3-10 SYRINGE (ML) INJECTION AS NEEDED
Status: DISCONTINUED | OUTPATIENT
Start: 2024-08-08 | End: 2024-08-08 | Stop reason: HOSPADM

## 2024-08-08 RX ORDER — FENTANYL CITRATE 50 UG/ML
25 INJECTION, SOLUTION INTRAMUSCULAR; INTRAVENOUS
Status: DISCONTINUED | OUTPATIENT
Start: 2024-08-08 | End: 2024-08-08 | Stop reason: HOSPADM

## 2024-08-08 RX ORDER — PROMETHAZINE HYDROCHLORIDE 25 MG/1
25 TABLET ORAL ONCE AS NEEDED
Status: DISCONTINUED | OUTPATIENT
Start: 2024-08-08 | End: 2024-08-08 | Stop reason: HOSPADM

## 2024-08-08 RX ORDER — ONDANSETRON 2 MG/ML
4 INJECTION INTRAMUSCULAR; INTRAVENOUS ONCE AS NEEDED
Status: DISCONTINUED | OUTPATIENT
Start: 2024-08-08 | End: 2024-08-08 | Stop reason: HOSPADM

## 2024-08-08 RX ORDER — HYDROMORPHONE HYDROCHLORIDE 1 MG/ML
0.25 INJECTION, SOLUTION INTRAMUSCULAR; INTRAVENOUS; SUBCUTANEOUS
Status: DISCONTINUED | OUTPATIENT
Start: 2024-08-08 | End: 2024-08-08 | Stop reason: HOSPADM

## 2024-08-08 RX ORDER — EPHEDRINE SULFATE 50 MG/ML
5 INJECTION, SOLUTION INTRAVENOUS ONCE AS NEEDED
Status: DISCONTINUED | OUTPATIENT
Start: 2024-08-08 | End: 2024-08-08 | Stop reason: HOSPADM

## 2024-08-08 RX ORDER — LIDOCAINE HYDROCHLORIDE 20 MG/ML
INJECTION, SOLUTION EPIDURAL; INFILTRATION; INTRACAUDAL; PERINEURAL AS NEEDED
Status: DISCONTINUED | OUTPATIENT
Start: 2024-08-08 | End: 2024-08-08 | Stop reason: SURG

## 2024-08-08 RX ORDER — HYDROCODONE BITARTRATE AND ACETAMINOPHEN 7.5; 325 MG/1; MG/1
1 TABLET ORAL EVERY 4 HOURS PRN
Status: DISCONTINUED | OUTPATIENT
Start: 2024-08-08 | End: 2024-08-08 | Stop reason: HOSPADM

## 2024-08-08 RX ORDER — HYDRALAZINE HYDROCHLORIDE 20 MG/ML
5 INJECTION INTRAMUSCULAR; INTRAVENOUS
Status: DISCONTINUED | OUTPATIENT
Start: 2024-08-08 | End: 2024-08-08 | Stop reason: HOSPADM

## 2024-08-08 RX ADMIN — LIDOCAINE HYDROCHLORIDE 60 MG: 20 INJECTION, SOLUTION EPIDURAL; INFILTRATION; INTRACAUDAL; PERINEURAL at 09:32

## 2024-08-08 RX ADMIN — FAMOTIDINE 20 MG: 10 INJECTION INTRAVENOUS at 09:04

## 2024-08-08 RX ADMIN — SODIUM CHLORIDE 2000 MG: 900 INJECTION INTRAVENOUS at 09:24

## 2024-08-08 RX ADMIN — PROPOFOL 100 MCG/KG/MIN: 10 INJECTION, EMULSION INTRAVENOUS at 09:34

## 2024-08-08 RX ADMIN — SODIUM CHLORIDE, POTASSIUM CHLORIDE, SODIUM LACTATE AND CALCIUM CHLORIDE 9 ML/HR: 600; 310; 30; 20 INJECTION, SOLUTION INTRAVENOUS at 08:30

## 2024-08-08 RX ADMIN — Medication 3 ML: at 09:04

## 2024-08-08 RX ADMIN — PROPOFOL 70 MG: 10 INJECTION, EMULSION INTRAVENOUS at 09:32

## 2024-08-08 RX ADMIN — Medication 1 ML: at 09:25

## 2024-08-08 NOTE — ANESTHESIA POSTPROCEDURE EVALUATION
"Patient: Mary Cueva    Procedure Summary       Date: 08/08/24 Room / Location:  [facility] /  [facility]    Anesthesia Start: 0927 Anesthesia Stop: 1018    Procedure: right breast needle-localized excisional biopsy (Right: Breast) Diagnosis:       Atypical ductal hyperplasia of breast      (Atypical ductal hyperplasia of breast [N60.99])    Surgeons: Shelbie Morales MD Provider: Isaías Rendon DO    Anesthesia Type: MAC ASA Status: 2            Anesthesia Type: MAC    Vitals  Vitals Value Taken Time   /62 08/08/24 1035   Temp     Pulse 74 08/08/24 1035   Resp 16 08/08/24 1035   SpO2 100 % 08/08/24 1035           Post Anesthesia Care and Evaluation    Pain management: adequate    Airway patency: patent  Anesthetic complications: No anesthetic complications    Cardiovascular status: acceptable  Respiratory status: acceptable  Hydration status: acceptable    Comments: /62 (BP Location: Left arm, Patient Position: Lying)   Pulse 74   Temp 36.4 °C (97.5 °F) (Oral)   Resp 16   Ht 160 cm (62.99\")   Wt 77.1 kg (169 lb 15.6 oz)   SpO2 100%   BMI 30.12 kg/m²       "

## 2024-08-08 NOTE — H&P
GENERAL SURGERY BENIGN BREAST HISTORY AND PHYSICAL      SUMMARY:  Mary Cueva is a 74 y.o. lady with:  A diagnosis of breast atypical ductal hyperplasia.  -Plan for right breast wire localized excisional biopsy. Risks (including bleeding, infection, damage to surrounding structures, need for additional surgery), benefits and alternatives were discussed with the patient who agreed and wished to proceed. Risk of pathologic upgrade was also discussed with possible need for additional treatment.      High risk screening:   -Norma Morales lifetime breast cancer risk: 7%. -his patient does qualify to be referred to medical oncology postoperatively pending final pathology for evaluation for prophylactic endocrine therapy due to high risk for breast cancer.     Referring Provider: No ref. provider found     Chief complaint: abnormal breast imaging     HPI: Ms. Mary Cueva is seen at the request of No ref. provider found. The patient is a 74 y.o. woman being seen for a new diagnosis of right breast atypical ductal hyperplasia.       This was initially detected as an imaging abnormality on routine screening. Her work-up is detailed in the breast history section below. She has had regular annual mammograms. She denies any prior history of abnormal mammograms or breast biopsies. She denies any breast lumps, pain, skin changes, or nipple discharge.     She has a family history of breast cancer in her maternal aunt (late in life) and her daughter (50's). She denies any family history of ovarian cancer.      TIMELINE OF WORKUP:  4/23/2024 bilateral screening mammogram:  There are scattered areas of fibroglandular density.  There are calcifications seen in the posterior one third upper outer region of the right breast.  Requires additional evaluation.  BI-RADS Category 0     4/30/2024 right breast diagnostic mammogram:  There are scattered areas of fibroglandular density.  On present exam there are multiple amorphous and  indistinct calcifications measuring 6 mm in segmental distribution in the posterior one third region of the right breast at 10:00 9 cm from the nipple.  Suspicious.  Stereotactic biopsy is recommended.  BI-RADS Category 4A     2024 right breast stereotactic biopsy:  The right breast at 10:00 was isolated and the calcifications were localized.  8 core specimens were obtained.  A Top-Hat clip was placed.  Clip was in the expected position and confirmed partial removal of the lesion.  Pathology returned as ADH which is high risk and concordant.     2024 Pathology:   Final Diagnosis   1.  Right breast at 10:00, (Top-Hat clip-for calcifications), stereotactic core biopsy:               A.  Scattered foci of low grade atypical ductal hyperplasia (ADH) with focal microcalcification.               B.  No definitive malignancy identified (see comment).       7/3/2024 Bilateral Breast MRI   IMPRESSION AND RECOMMENDATION:   1.  A biopsy tract at 10-11 o'clock in the posterior right breast represents the site of biopsy-proven atypia, for which surgical management is recommended. Approximately 1.1 cm non-mass enhancement within the inferior aspect of the tract is in the region of the previously biopsied calcifications on mammogram and a suspicious. The  biopsy clip is felt to be superiorly displaced based on the MRI appearance and postbiopsy mammogram. This is described above. Recommend surgical management with preoperative localization targeting the biopsy  clip and area of interest, which has been marked on the postbiopsy mammogram from 2024.  2.  No MRI evidence of malignancy in the left breast.  BI-RADS Category 4: Suspicious     MEDICAL HISTORY:   Gynecologic History:   . P:0 AB:0  Age at first childbirth: N/A  Lactation/How long: N/A  Age at menarche: 12  Age at menopause: 50  Total years of oral contraceptive use: previously   Total years of hormone replacement therapy: none     Past Medical History:    HTN     Past Surgical History:    Appendectomy   Cholecystectomy   Ovarian cyst removal   Tonsillectomy   C section      Family History:    As above     Social History:   Denies tobacco use  Occasional alcohol use     Allergies:   Allergies   No Known Allergies        Medications:     Current Medications      Current Outpatient Medications:     acetaminophen (TYLENOL) 500 MG tablet, Take 1 tablet by mouth Every 6 (Six) Hours As Needed for Mild Pain., Disp: , Rfl:     amLODIPine (NORVASC) 5 MG tablet, Take 1 tablet by mouth Daily., Disp: 90 tablet, Rfl: 4    fluticasone (FLONASE) 50 MCG/ACT nasal spray, 2 sprays into the nostril(s) as directed by provider Daily., Disp: 48 g, Rfl: 4    montelukast (SINGULAIR) 10 MG tablet, Take 1 tablet by mouth Every Night., Disp: 90 tablet, Rfl: 4    Multiple Vitamins-Minerals (EYE VITAMINS PO), Take  by mouth. OTC RECOMMENDED BY OPTOMETRIST DUE TO MACULAR DENGENERATION, Disp: , Rfl:     oxybutynin (DITROPAN) 5 MG tablet, TAKE 1 TABLET BY MOUTH THREE TIMES DAILY, Disp: 90 tablet, Rfl: 1    triamcinolone (KENALOG) 0.1 % cream, APPLY TO TRUNK ONCE DAILY AS NEEDED., Disp: , Rfl:     triamterene-hydrochlorothiazide (MAXZIDE-25) 37.5-25 MG per tablet, Take 1 tablet by mouth Daily., Disp: 90 tablet, Rfl: 4    venlafaxine XR (EFFEXOR-XR) 150 MG 24 hr capsule, Take 1 capsule by mouth Daily., Disp: 90 capsule, Rfl: 4        Labs:    Labs from 1/26/2024 personally reviewed by me      ROS:   Influenza-like illness: no fever, no  cough, no  sore throat, no  body aches, no loss of sense of taste or smell, no known exposure to person with Covid-19.  Constitutional: Negative for fevers or chills  HENT: Negative for hearing loss or runny nose  Eyes: Negative for vision changes or scleral icterus  Respiratory: Negative for cough or shortness of breath  Cardiovascular: Negative for chest pain or heart palpitations  Gastrointestinal: Negative for abdominal pain, nausea, vomiting, constipation,  melena, or hematochezia  Genitourinary: Negative for hematuria or dysuria  Musculoskeletal: Negative for joint swelling or gait instability  Neurologic: Negative for tremors or seizures  Psychiatric: Negative for suicidal ideations or depression  All other systems reviewed and negative     PHYSICAL EXAM:   Constitutional: Well-developed well-nourished, no acute distress  Eyes: Conjunctiva normal, sclera nonicteric  ENMT: Hearing grossly normal, oral mucosa moist  Neck: Supple, no palpable mass, trachea midline  Respiratory: Clear to auscultation, normal inspiratory effort  Cardiovascular: Regular rate, no murmur, no peripheral edema, no jugular venous distention  Breast: symmetric  Right: No visible abnormalities on inspection while seated, with arms raised or hands on hips. No masses, skin changes, or nipple abnormalities.  Left:  No visible abnormalities on inspection while seated, with arms raised or hands on hips. No masses, skin changes, or nipple abnormalities.  Biopsy site appreciated in right breast, otherwise no skin changes.   No clinical chest wall involvement.  Gastrointestinal: Soft, nontender  Lymphatics (palpable nodes): No cervical, supraclavicular or axillary lymphadenopathy  Skin:  Warm, dry, no rash on visualized skin surfaces  Musculoskeletal: Symmetric strength, normal gait  Psychiatric: Alert and oriented ×3, normal affect       SHANEL SHELTON M.D.  General and Endoscopic Surgery  [facility]     [address], [address]  [place], [place], [address]  P: [phone]  F: [phone]

## 2024-08-08 NOTE — OP NOTE
----- Message from Stuart Kirkpatrick MD sent at 4/29/2020  7:58 AM CDT -----  Please call  #1 All the blood tests for the swelling in the armpit are normal. How is the swelling. If remains we will need to consider an US or referral for possible removal. If better finish abx and call if does not resolve in 3-4 weeks or returns    #2 cholesterol is excellent, he should continue his crestor long term   OPERATIVE REPORT     DATE: 8/8/2024     SURGEON: Shelbie Morales MD      ASSISTANT: Kalyani Garrett, who was present for necessary suctioning, retracting, suturing throughout the procedure      OPERATION PERFORMED: Right breast wire localized excisional biopsy     PREOPERATIVE DIAGNOSIS: ADH of the right breast      POSTOPERATIVE DIAGNOSIS: Same     ANESTHESIA: MAC     SPECIMEN: Right breast wire localized excisional biopsy (short stitch superior, long lateral, double anterior)     DRAINS: None     BLOOD LOSS: Minimal     INDICATION FOR OPERATION: Mrs. Cueva is a 74 y.o. lady who was recently diagnosed with right breast atypical ductal hyperplasia. Right breast wire localized excisional biopsy was recommended. All risks (including bleeding, infection, damage to surrounding structures, need for further surgery, pathologic upgrade), benefits and alternatives were explained to the patient who agreed and wished to proceed. Informed consent was signed.      OPERATIVE COURSE: The patient was taken to the operating room, transferred onto the operating room table, and underwent anesthesia without incident. The patient was prepped and draped in sterile fashion. A time out was performed and preoperative antibiotics were given.  Half percent Marcaine with epinephrine was injected into the skin and subcutaneous tissues.  A curvilinear incision was made in the upper outer right breast.  Bovie electrocautery was used to create a flap along the length of the wire 1 cm in thickness.  The tissue was transected around the tip of the wire. Lastly the wire was brought through the incision with 2 hemostats. The tissue was amputated at its base.  It was marked as listed above.  Specimen radiograph confirmed clip, wire, and abnormal breast tissue.  It was sent for fresh permanent specimen.  The area was irrigated and appeared hemostatic.  There were no signs of bleeding.  The rest of the local anesthetic was administered.  It was closed with  interrupted 3-0 Vicryl sutures and a running 4-0 Monocryl suture.  Skin glue was placed over the incision.  The patient tolerated the procedure well. All needle and lap counts were correct at the end of the case. The patient was then awoken from anesthesia and taken to recovery for further monitoring.       Shelbie Morales MD   General and Endoscopic Surgery  [facility]     [address], [address]  [place], [place], [address]  P: [phone]  F: [phone]

## 2024-08-08 NOTE — ANESTHESIA PREPROCEDURE EVALUATION
Anesthesia Evaluation     Patient summary reviewed   no history of anesthetic complications:   NPO Solid Status: > 8 hours  NPO Liquid Status: > 2 hours           Airway   Mallampati: II  TM distance: >3 FB  Neck ROM: full  No difficulty expected  Dental      Pulmonary     breath sounds clear to auscultation  (+) ,sleep apnea (poor compliance) on CPAP  (-) shortness of breath, recent URI, not a smoker  Cardiovascular   Exercise tolerance: good (4-7 METS)    ECG reviewed  Rhythm: regular  Rate: normal    (+) hypertension, dysrhythmias PVC, PAC  (-) past MI, angina    ROS comment: 2020 ECHO - Interpretation Summary  · Peak velocity of the flow distal to the aortic valve is 142 cm/s. Aortic valve maximum pressure gradient is 8.1 mmHg. Aortic valve mean pressure gradient is 4 mmHg. Aortic valve dimensionless index is 0.8 .  · Estimated right ventricular systolic pressure from tricuspid regurgitation is mildly elevated (35-45 mmHg). Calculated right ventricular systolic pressure from tricuspid regurgitation is 35 mmHg.  · Estimated left ventricular EF = 65% Left ventricular systolic function is normal.      Neuro/Psych  (+) headaches, psychiatric history Anxiety and Depression  (-) seizures, CVA  GI/Hepatic/Renal/Endo    (+) obesityRenal disease: Cr 0.87.    Musculoskeletal     (-) neck stiffness  Abdominal    Substance History      OB/GYN          Other   arthritis,     (-) blood dyscrasia  ROS/Med Hx Other: R breast mass                    Anesthesia Plan    ASA 2     MAC     (MAC anesthesia discussed with patient and/or patient representative. Risks (including but not limited to intra-op awareness), benefits, and alternatives were discussed. Understanding was voiced with an agreement to proceed with a MAC technique and General as a backup option. )    Anesthetic plan, risks, benefits, and alternatives have been provided, discussed and informed consent has been obtained with: patient.        CODE STATUS:

## 2024-08-09 LAB
LAB AP CASE REPORT: NORMAL
LAB AP CLINICAL INFORMATION: NORMAL
LAB AP DIAGNOSIS COMMENT: NORMAL
PATH REPORT.FINAL DX SPEC: NORMAL
PATH REPORT.GROSS SPEC: NORMAL

## 2024-08-14 ENCOUNTER — TELEPHONE (OUTPATIENT)
Dept: SURGERY | Facility: CLINIC | Age: 74
End: 2024-08-14
Payer: MEDICARE

## 2024-08-14 NOTE — TELEPHONE ENCOUNTER
----- Message from Shelbiecleveland Morales sent at 8/13/2024  6:51 PM EDT -----  Regarding: pathology results  Can you let her know her pathology from surgery returned as benign? The atypical tissue was completely removed and there were no other atypical cells, precancer, or cancer. She should follow up in 2 weeks.    Final Diagnosis  1.  Breast, right, excisional biopsy: Breast tissue with               -A.  Focal atypical ductal hyperplasia               -B.  Biopsy site changes identified               -C. Top-Hat clip identified               -D.  Margins free of atypia, in situ and invasive disease.    Thanks,   Shelbie Morales

## 2024-08-14 NOTE — TELEPHONE ENCOUNTER
Spoke with patient and informed her of results and recommended follow-up.  She voiced understanding.  Scheduled appointment for 08/26/24 with Dr. Morales.

## 2024-08-26 ENCOUNTER — OFFICE VISIT (OUTPATIENT)
Dept: SURGERY | Facility: CLINIC | Age: 74
End: 2024-08-26
Payer: MEDICARE

## 2024-08-26 VITALS
HEIGHT: 63 IN | DIASTOLIC BLOOD PRESSURE: 96 MMHG | WEIGHT: 172 LBS | SYSTOLIC BLOOD PRESSURE: 144 MMHG | BODY MASS INDEX: 30.48 KG/M2

## 2024-08-26 DIAGNOSIS — N60.99 ATYPICAL DUCTAL HYPERPLASIA OF BREAST: Primary | ICD-10-CM

## 2024-08-26 PROCEDURE — 1159F MED LIST DOCD IN RCRD: CPT | Performed by: STUDENT IN AN ORGANIZED HEALTH CARE EDUCATION/TRAINING PROGRAM

## 2024-08-26 PROCEDURE — 99024 POSTOP FOLLOW-UP VISIT: CPT | Performed by: STUDENT IN AN ORGANIZED HEALTH CARE EDUCATION/TRAINING PROGRAM

## 2024-08-26 PROCEDURE — 3077F SYST BP >= 140 MM HG: CPT | Performed by: STUDENT IN AN ORGANIZED HEALTH CARE EDUCATION/TRAINING PROGRAM

## 2024-08-26 PROCEDURE — 1160F RVW MEDS BY RX/DR IN RCRD: CPT | Performed by: STUDENT IN AN ORGANIZED HEALTH CARE EDUCATION/TRAINING PROGRAM

## 2024-08-26 PROCEDURE — 3080F DIAST BP >= 90 MM HG: CPT | Performed by: STUDENT IN AN ORGANIZED HEALTH CARE EDUCATION/TRAINING PROGRAM

## 2024-08-26 NOTE — PROGRESS NOTES
GENERAL SURGERY BENIGN BREAST HISTORY AND PHYSICAL      SUMMARY:  Mary Cueva is a 74 y.o. lady with:  A history of breast atypical ductal hyperplasia.  -s/p right breast wire localized excisional biopsy 8/2024.   -Annual mammogram due April 2025.  -Follow-up with me in May 2025.     High risk screening:   -Norma Morales lifetime breast cancer risk: 7%. This patient does qualify to be referred to medical oncology postoperatively pending final pathology for evaluation for prophylactic endocrine therapy due to high risk for breast cancer. She declines for now.     Referring Provider: No ref. provider found     Chief complaint: abnormal breast imaging     HPI: MsShashi Cueva is seen at the request of No ref. provider found. The patient is a 74 y.o. woman being seen for a new diagnosis of right breast atypical ductal hyperplasia.       This was initially detected as an imaging abnormality on routine screening. Her work-up is detailed in the breast history section below. She has had regular annual mammograms. She denies any prior history of abnormal mammograms or breast biopsies. She denies any breast lumps, pain, skin changes, or nipple discharge.     She has a family history of breast cancer in her maternal aunt (late in life) and her daughter (50's). She denies any family history of ovarian cancer.    8/26/2024 she presents today for follow-up.  She is done well since surgery with no concerns or complaints.  She is getting back to her daily activities.     TIMELINE OF WORKUP:  4/23/2024 bilateral screening mammogram:  There are scattered areas of fibroglandular density.  There are calcifications seen in the posterior one third upper outer region of the right breast.  Requires additional evaluation.  BI-RADS Category 0     4/30/2024 right breast diagnostic mammogram:  There are scattered areas of fibroglandular density.  On present exam there are multiple amorphous and indistinct calcifications measuring 6 mm in  segmental distribution in the posterior one third region of the right breast at 10:00 9 cm from the nipple.  Suspicious.  Stereotactic biopsy is recommended.  BI-RADS Category 4A     5/29/2024 right breast stereotactic biopsy:  The right breast at 10:00 was isolated and the calcifications were localized.  8 core specimens were obtained.  A Top-Hat clip was placed.  Clip was in the expected position and confirmed partial removal of the lesion.  Pathology returned as ADH which is high risk and concordant.     5/29/2024 Pathology:   Final Diagnosis   1.  Right breast at 10:00, (Top-Hat clip-for calcifications), stereotactic core biopsy:               A.  Scattered foci of low grade atypical ductal hyperplasia (ADH) with focal microcalcification.               B.  No definitive malignancy identified (see comment).       7/3/2024 Bilateral Breast MRI   IMPRESSION AND RECOMMENDATION:   1.  A biopsy tract at 10-11 o'clock in the posterior right breast represents the site of biopsy-proven atypia, for which surgical management is recommended. Approximately 1.1 cm non-mass enhancement within the inferior aspect of the tract is in the region of the previously biopsied calcifications on mammogram and a suspicious. The  biopsy clip is felt to be superiorly displaced based on the MRI appearance and postbiopsy mammogram. This is described above. Recommend surgical management with preoperative localization targeting the biopsy  clip and area of interest, which has been marked on the postbiopsy mammogram from 5/29/2024.  2.  No MRI evidence of malignancy in the left breast.  BI-RADS Category 4: Suspicious     8/8/2024 Right breast wire localized excisional biopsy   Final Diagnosis   1.  Breast, right, excisional biopsy: Breast tissue with               -A.  Focal atypical ductal hyperplasia               -B.  Biopsy site changes identified               -C. Top-Hat clip identified               -D.  Margins free of atypia, in situ  and invasive disease.     MEDICAL HISTORY:   Gynecologic History:   . P:0 AB:0  Age at first childbirth: N/A  Lactation/How long: N/A  Age at menarche: 12  Age at menopause: 50  Total years of oral contraceptive use: previously   Total years of hormone replacement therapy: none     Past Medical History:   HTN     Past Surgical History:    Appendectomy   Cholecystectomy   Ovarian cyst removal   Tonsillectomy   C section      Family History:    As above     Social History:   Denies tobacco use  Occasional alcohol use     Allergies:   Allergies   No Known Allergies      Medications:      Current Medications      Current Outpatient Medications:     acetaminophen (TYLENOL) 500 MG tablet, Take 1 tablet by mouth Every 6 (Six) Hours As Needed for Mild Pain., Disp: , Rfl:     amLODIPine (NORVASC) 5 MG tablet, Take 1 tablet by mouth Daily., Disp: 90 tablet, Rfl: 4    fluticasone (FLONASE) 50 MCG/ACT nasal spray, 2 sprays into the nostril(s) as directed by provider Daily., Disp: 48 g, Rfl: 4    montelukast (SINGULAIR) 10 MG tablet, Take 1 tablet by mouth Every Night., Disp: 90 tablet, Rfl: 4    Multiple Vitamins-Minerals (EYE VITAMINS PO), Take  by mouth. OTC RECOMMENDED BY OPTOMETRIST DUE TO MACULAR DENGENERATION, Disp: , Rfl:     oxybutynin (DITROPAN) 5 MG tablet, TAKE 1 TABLET BY MOUTH THREE TIMES DAILY, Disp: 90 tablet, Rfl: 1    triamcinolone (KENALOG) 0.1 % cream, APPLY TO TRUNK ONCE DAILY AS NEEDED., Disp: , Rfl:     triamterene-hydrochlorothiazide (MAXZIDE-25) 37.5-25 MG per tablet, Take 1 tablet by mouth Daily., Disp: 90 tablet, Rfl: 4    venlafaxine XR (EFFEXOR-XR) 150 MG 24 hr capsule, Take 1 capsule by mouth Daily., Disp: 90 capsule, Rfl: 4         Labs:    Labs from 2024 personally reviewed by me      ROS:   Influenza-like illness: no fever, no  cough, no  sore throat, no  body aches, no loss of sense of taste or smell, no known exposure to person with Covid-19.  Constitutional: Negative for fevers or  chills  HENT: Negative for hearing loss or runny nose  Eyes: Negative for vision changes or scleral icterus  Respiratory: Negative for cough or shortness of breath  Cardiovascular: Negative for chest pain or heart palpitations  Gastrointestinal: Negative for abdominal pain, nausea, vomiting, constipation, melena, or hematochezia  Genitourinary: Negative for hematuria or dysuria  Musculoskeletal: Negative for joint swelling or gait instability  Neurologic: Negative for tremors or seizures  Psychiatric: Negative for suicidal ideations or depression  All other systems reviewed and negative     PHYSICAL EXAM:   Constitutional: Well-developed well-nourished, no acute distress  Eyes: Conjunctiva normal, sclera nonicteric  ENMT: Hearing grossly normal, oral mucosa moist  Neck: Supple, no palpable mass, trachea midline  Respiratory: Clear to auscultation, normal inspiratory effort  Cardiovascular: Regular rate, no murmur, no peripheral edema, no jugular venous distention  Breast: symmetric  Right: No visible abnormalities on inspection while seated, with arms raised or hands on hips. No masses, skin changes, or nipple abnormalities.  Right breast incision and upper outer quadrant clean and dry with no erythema or drainage, no hematoma or seroma.  Left:  No visible abnormalities on inspection while seated, with arms raised or hands on hips. No masses, skin changes, or nipple abnormalities.  No clinical chest wall involvement.  Gastrointestinal: Soft, nontender  Lymphatics (palpable nodes): No cervical, supraclavicular or axillary lymphadenopathy  Skin:  Warm, dry, no rash on visualized skin surfaces  Musculoskeletal: Symmetric strength, normal gait  Psychiatric: Alert and oriented ×3, normal affect       SHANEL SHELTON M.D.  General and Endoscopic Surgery  [facility] [place]     [address], [address]  [place], [place], [address]  P: [phone]  F: [phone]

## 2024-11-08 DIAGNOSIS — F41.1 GENERALIZED ANXIETY DISORDER: Chronic | ICD-10-CM

## 2024-11-08 RX ORDER — VENLAFAXINE HYDROCHLORIDE 150 MG/1
150 CAPSULE, EXTENDED RELEASE ORAL DAILY
Qty: 90 CAPSULE | Refills: 0 | Status: SHIPPED | OUTPATIENT
Start: 2024-11-08

## 2024-11-08 NOTE — TELEPHONE ENCOUNTER
Caller: Cueva Karissa    Relationship: Self    Best call back number: [phone]    Requested Prescriptions:   Requested Prescriptions     Pending Prescriptions Disp Refills    venlafaxine XR (EFFEXOR-XR) 150 MG 24 hr capsule 90 capsule 0     Sig: Take 1 capsule by mouth Daily.        Pharmacy where request should be sent: [address] [phone] [facility] [phone] [facility]     Last office visit with prescribing clinician: Visit date not found   Last telemedicine visit with prescribing clinician: Visit date not found   Next office visit with prescribing clinician: 11/27/2024     Ramses Gabriel Rep   11/08/24 15:38 EST

## 2024-11-27 ENCOUNTER — OFFICE VISIT (OUTPATIENT)
Dept: INTERNAL MEDICINE | Facility: CLINIC | Age: 74
End: 2024-11-27
Payer: MEDICARE

## 2024-11-27 VITALS
OXYGEN SATURATION: 98 % | WEIGHT: 174 LBS | HEART RATE: 78 BPM | BODY MASS INDEX: 30.83 KG/M2 | DIASTOLIC BLOOD PRESSURE: 74 MMHG | RESPIRATION RATE: 18 BRPM | SYSTOLIC BLOOD PRESSURE: 124 MMHG | HEIGHT: 63 IN

## 2024-11-27 DIAGNOSIS — F41.1 GENERALIZED ANXIETY DISORDER: Chronic | ICD-10-CM

## 2024-11-27 DIAGNOSIS — I10 BENIGN ESSENTIAL HYPERTENSION: Primary | Chronic | ICD-10-CM

## 2024-11-27 DIAGNOSIS — E78.5 DYSLIPIDEMIA: Chronic | ICD-10-CM

## 2024-11-27 DIAGNOSIS — M85.80 OSTEOPENIA, UNSPECIFIED LOCATION: ICD-10-CM

## 2024-11-27 DIAGNOSIS — N32.81 OVERACTIVE BLADDER: Chronic | ICD-10-CM

## 2024-11-27 RX ORDER — OXYBUTYNIN CHLORIDE 5 MG/1
5 TABLET ORAL 3 TIMES DAILY
Qty: 90 TABLET | Refills: 1 | Status: SHIPPED | OUTPATIENT
Start: 2024-11-27 | End: 2024-11-27

## 2024-11-27 RX ORDER — OXYBUTYNIN CHLORIDE 5 MG/1
5 TABLET ORAL 2 TIMES DAILY
Qty: 90 TABLET | Refills: 1 | Status: SHIPPED | OUTPATIENT
Start: 2024-12-11

## 2024-11-27 RX ORDER — VENLAFAXINE HYDROCHLORIDE 150 MG/1
150 CAPSULE, EXTENDED RELEASE ORAL DAILY
Qty: 90 CAPSULE | Refills: 1 | Status: SHIPPED | OUTPATIENT
Start: 2025-02-10 | End: 2025-08-09

## 2024-11-27 NOTE — PROGRESS NOTES
"Chief Complaint  Establish Care (Poss refills ) and Hypertension    Subjective        Mary Cueva presents to [facility] PRIMARY CARE  History of Present Illness  This is a 74-year-old female with chronic medical conditions of hypertension, hyperlipidemia, overactive bladder, generalized anxiety who presents to [facility].    Hypertension: This is well-controlled on combination therapy triamterene-hydrochlorothiazide as well as 5 mg Norvasc daily.  Overactive bladder: This is well-controlled on 5 mg oxybutynin up to 3 times a day she only needs this once or twice a day.  General anxiety disorder: She is taking 50 mg of venlafaxine XR daily.  She reports this works extremely well for her does not desire any changes at this time  Is also been doing well after undergoing a right breast biopsy over the summer with pathology that was benign.  She sees Dr. Zayra Morales regarding this and has a physician at [facility]'s [facility] for women's health.    Objective   Vital Signs:  /74 (BP Location: Left arm, Patient Position: Sitting, Cuff Size: Adult)   Pulse 78   Resp 18   Ht 160 cm (62.99\")   Wt 78.9 kg (174 lb)   SpO2 98%   BMI 30.83 kg/m²   Estimated body mass index is 30.83 kg/m² as calculated from the following:    Height as of this encounter: 160 cm (62.99\").    Weight as of this encounter: 78.9 kg (174 lb).            Physical Exam  Vitals and nursing note reviewed.   Constitutional:       General: She is not in acute distress.     Appearance: Normal appearance.   Cardiovascular:      Rate and Rhythm: Normal rate and regular rhythm.      Heart sounds: No murmur heard.  Pulmonary:      Effort: Pulmonary effort is normal. No respiratory distress.      Breath sounds: Normal breath sounds.   Skin:     General: Skin is warm and dry.   Neurological:      Mental Status: She is alert and oriented to person, place, and time.   Psychiatric:         Mood and Affect: Mood normal.         Thought " Content: Thought content normal.         Judgment: Judgment normal.        Result Review :  The following data was reviewed by: Stacia Hu MD on 11/27/2024:  Common labs          1/26/2024    11:16 7/24/2024    14:20   Common Labs   Glucose 95  104    BUN 20  15    Creatinine 0.83  0.87    Sodium 141  138    Potassium 3.7  3.5    Chloride 99  101    Calcium 10.4  9.5    Total Protein 7.4     Albumin 4.5     Total Bilirubin 0.5     Alkaline Phosphatase 89     AST (SGOT) 16     ALT (SGPT) 12     WBC 11.47  10.97    Hemoglobin 15.8  15.6    Hematocrit 45.9  47.5    Platelets 361  318    Total Cholesterol 212     Triglycerides 90     HDL Cholesterol 79     LDL Cholesterol  117       Data reviewed : Consultant notes general surgery           Assessment and Plan   Diagnoses and all orders for this visit:    1. Benign essential hypertension (Primary)  Assessment & Plan:  Hypertension is stable and controlled  Continue current treatment regimen.  Regular aerobic exercise.  Ambulatory blood pressure monitoring.  Blood pressure will be reassessed  next office visit .      2. Dyslipidemia    3. Overactive bladder  -     Discontinue: oxybutynin (DITROPAN) 5 MG tablet; Take 1 tablet by mouth 3 (Three) Times a Day.  Dispense: 90 tablet; Refill: 1  -     oxybutynin (DITROPAN) 5 MG tablet; Take 1 tablet by mouth 2 (Two) Times a Day.  Dispense: 90 tablet; Refill: 1    4. Osteopenia, unspecified location    5. Generalized anxiety disorder  -     venlafaxine XR (EFFEXOR-XR) 150 MG 24 hr capsule; Take 1 capsule by mouth Daily for 180 days.  Dispense: 90 capsule; Refill: 1    Will have the patient follow-up in February for annual wellness visit with fasting labs at that time.         Follow Up   Return in about 3 months (around 2/27/2025) for Medicare Wellness, Next scheduled follow up.  Patient was given instructions and counseling regarding her condition or for health maintenance advice. Please see specific information pulled  into the AVS if appropriate.

## 2024-11-27 NOTE — ASSESSMENT & PLAN NOTE
Hypertension is stable and controlled  Continue current treatment regimen.  Regular aerobic exercise.  Ambulatory blood pressure monitoring.  Blood pressure will be reassessed  next office visit .

## 2025-02-28 ENCOUNTER — TELEPHONE (OUTPATIENT)
Dept: INTERNAL MEDICINE | Facility: CLINIC | Age: 75
End: 2025-02-28

## 2025-02-28 NOTE — TELEPHONE ENCOUNTER
Left voicemail for patient letting her know her appt today at 215 was cancelled due to provider being out of office and to call the office to get rescheduled     Hub okay to relay message and reschedule

## 2025-03-14 ENCOUNTER — OFFICE VISIT (OUTPATIENT)
Dept: INTERNAL MEDICINE | Facility: CLINIC | Age: 75
End: 2025-03-14
Payer: MEDICARE

## 2025-03-14 VITALS
RESPIRATION RATE: 18 BRPM | SYSTOLIC BLOOD PRESSURE: 128 MMHG | BODY MASS INDEX: 31.57 KG/M2 | OXYGEN SATURATION: 97 % | HEIGHT: 63 IN | HEART RATE: 120 BPM | DIASTOLIC BLOOD PRESSURE: 82 MMHG | WEIGHT: 178.2 LBS

## 2025-03-14 DIAGNOSIS — I10 BENIGN ESSENTIAL HYPERTENSION: Chronic | ICD-10-CM

## 2025-03-14 DIAGNOSIS — J30.1 NON-SEASONAL ALLERGIC RHINITIS DUE TO POLLEN: ICD-10-CM

## 2025-03-14 DIAGNOSIS — Z00.00 MEDICARE ANNUAL WELLNESS VISIT, SUBSEQUENT: Primary | ICD-10-CM

## 2025-03-14 DIAGNOSIS — F41.1 GENERALIZED ANXIETY DISORDER: Chronic | ICD-10-CM

## 2025-03-14 RX ORDER — TRIAMTERENE AND HYDROCHLOROTHIAZIDE 37.5; 25 MG/1; MG/1
1 TABLET ORAL DAILY
Qty: 90 TABLET | Refills: 4 | Status: SHIPPED | OUTPATIENT
Start: 2025-03-14

## 2025-03-14 RX ORDER — FLUTICASONE PROPIONATE 50 MCG
2 SPRAY, SUSPENSION (ML) NASAL DAILY
Qty: 48 G | Refills: 4 | Status: SHIPPED | OUTPATIENT
Start: 2025-03-14

## 2025-03-14 RX ORDER — MONTELUKAST SODIUM 10 MG/1
10 TABLET ORAL NIGHTLY
Qty: 90 TABLET | Refills: 3 | Status: SHIPPED | OUTPATIENT
Start: 2025-03-14

## 2025-03-14 RX ORDER — AMLODIPINE BESYLATE 5 MG/1
5 TABLET ORAL DAILY
Qty: 90 TABLET | Refills: 4 | Status: SHIPPED | OUTPATIENT
Start: 2025-03-14

## 2025-03-14 NOTE — PROGRESS NOTES
Subjective   The ABCs of the Annual Wellness Visit  Medicare Wellness Visit      Mary Cueva is a 75 y.o. patient who presents for a Medicare Wellness Visit.    The following portions of the patient's history were reviewed and   updated as appropriate: allergies, current medications, past medical history, past surgical history, and problem list.    Compared to one year ago, the patient's physical   health is the same.  Compared to one year ago, the patient's mental   health is the same.    Recent Hospitalizations:  This patient has had a [facility] admission record on file within the last 365 days.  Current Medical Providers:  Patient Care Team:  Stacia Hu MD as PCP - General (Internal Medicine)  Shelbie Morales MD as Surgeon (General Surgery)  Lesley Dang MD as Consulting Physician (Pulmonary Disease)  Concepcion Hough OD (Optometry)  Raul Andrews MD as Referring Physician (Dermatology)  Cristy Mcdonough MD as Consulting Physician (Obstetrics and Gynecology)    Outpatient Medications Prior to Visit   Medication Sig Dispense Refill    acetaminophen (TYLENOL) 500 MG tablet Take 1 tablet by mouth Every 6 (Six) Hours As Needed for Mild Pain.      Multiple Vitamins-Minerals (EYE VITAMINS PO) Take 1 tablet by mouth Daily. HOLD FOR SURGERY      oxybutynin (DITROPAN) 5 MG tablet Take 1 tablet by mouth 2 (Two) Times a Day. 90 tablet 1    triamcinolone (KENALOG) 0.1 % cream Apply 1 Application topically to the appropriate area as directed As Needed for Irritation.      venlafaxine XR (EFFEXOR-XR) 150 MG 24 hr capsule Take 1 capsule by mouth Daily for 180 days. 90 capsule 1    amLODIPine (NORVASC) 5 MG tablet Take 1 tablet by mouth Daily. 90 tablet 4    fluticasone (FLONASE) 50 MCG/ACT nasal spray 2 sprays into the nostril(s) as directed by provider Daily. 48 g 4    montelukast (SINGULAIR) 10 MG tablet Take 1 tablet by mouth Every Night. 90 tablet 4    triamterene-hydrochlorothiazide  "(MAXZIDE-25) 37.5-25 MG per tablet Take 1 tablet by mouth Daily. 90 tablet 4     No facility-administered medications prior to visit.     No opioid medication identified on active medication list. I have reviewed chart for other potential  high risk medication/s and harmful drug interactions in the elderly.      Aspirin is not on active medication list.  Aspirin use is not indicated based on review of current medical condition/s. Risk of harm outweighs potential benefits.  .    Patient Active Problem List   Diagnosis    Borderline glaucoma with anatomical narrow angle    Osteopenia    Vitamin D deficiency    Adiposity    Non-seasonal allergic rhinitis due to pollen    Generalized anxiety disorder    Benign essential hypertension    Health care maintenance    Allergic sinusitis    Insomnia secondary to anxiety    Neutrophilic leukocytosis    Upper back pain, chronic    Pyelonephritis of left kidney    Open-angle glaucoma of both eyes    Overactive bladder    Dyslipidemia    Atypical ductal hyperplasia of breast     Advance Care Planning Advance Directive is not on file.  ACP discussion was held with the patient during this visit. Patient has an advance directive (not in EMR), copy requested.            Objective   Vitals:    03/14/25 1121   BP: 128/82   BP Location: Left arm   Patient Position: Sitting   Cuff Size: Adult   Pulse: 120   Resp: 18   SpO2: 97%   Weight: 80.8 kg (178 lb 3.2 oz)   Height: 160 cm (62.99\")   PainSc: 0-No pain       Estimated body mass index is 31.57 kg/m² as calculated from the following:    Height as of this encounter: 160 cm (62.99\").    Weight as of this encounter: 80.8 kg (178 lb 3.2 oz).    BMI is >= 30 and <35. (Class 1 Obesity). The following options were offered after discussion;: information on healthy weight added to patient's after visit summary        Covid vaccine    Does the patient have evidence of cognitive impairment? No                                                        "                                         Health  Risk Assessment    Smoking Status:  Social History     Tobacco Use   Smoking Status Never   Smokeless Tobacco Never     Alcohol Consumption:  Social History     Substance and Sexual Activity   Alcohol Use Yes    Alcohol/week: 2.0 standard drinks of alcohol    Comment: 3 glass of wine or other in a week       Fall Risk Screen  STEADI Fall Risk Assessment was completed, and patient is at LOW risk for falls.Assessment completed on:3/14/2025    Depression Screening   Little interest or pleasure in doing things? Not at all   Feeling down, depressed, or hopeless? Not at all   PHQ-2 Total Score 0      Health Habits and Functional and Cognitive Screening:      3/13/2025     9:26 PM   Functional & Cognitive Status   Do you have difficulty preparing food and eating? No   Do you have difficulty bathing yourself, getting dressed or grooming yourself? No   Do you have difficulty using the toilet? No   Do you have difficulty moving around from place to place? No   Do you have trouble with steps or getting out of a bed or a chair? No   Current Diet Well Balanced Diet   Dental Exam Up to date   Eye Exam Not up to date   Exercise (times per week) 0 times per week   Current Exercises Include No Regular Exercise   Do you need help using the phone?  No   Are you deaf or do you have serious difficulty hearing?  Yes   Do you need help to go to places out of walking distance? No   Do you need help shopping? No   Do you need help preparing meals?  No   Do you need help with housework?  No   Do you need help with laundry? No   Do you need help taking your medications? No   Do you need help managing money? No   Do you ever drive or ride in a car without wearing a seat belt? No   Have you felt unusual stress, anger or loneliness in the last month? No   Who do you live with? Spouse   If you need help, do you have trouble finding someone available to you? No   Have you been bothered in the last  four weeks by sexual problems? No   Do you have difficulty concentrating, remembering or making decisions? No           Age-appropriate Screening Schedule:  Refer to the list below for future screening recommendations based on patient's age, sex and/or medical conditions. Orders for these recommended tests are listed in the plan section. The patient has been provided with a written plan.    Health Maintenance List  Health Maintenance   Topic Date Due    ZOSTER VACCINE (2 of 2) 09/26/2014    TDAP/TD VACCINES (2 - Td or Tdap) 11/06/2023    RSV Vaccine - Adults (1 - 1-dose 75+ series) Never done    COVID-19 Vaccine (7 - 2024-25 season) 09/01/2024    BMI FOLLOWUP  01/26/2025    ANNUAL WELLNESS VISIT  03/14/2026    DXA SCAN  04/03/2026    COLORECTAL CANCER SCREENING  04/18/2026    INFLUENZA VACCINE  Completed    Pneumococcal Vaccine 50+  Completed    HEPATITIS C SCREENING  Addressed    MAMMOGRAM  Discontinued                                                                                                                                                CMS Preventative Services Quick Reference  Risk Factors Identified During Encounter  None Identified    The above risks/problems have been discussed with the patient.  Pertinent information has been shared with the patient in the After Visit Summary.  An After Visit Summary and PPPS were made available to the patient.    Follow Up:   Next Medicare Wellness visit to be scheduled in 1 year.         Additional E&M Note during same encounter follows:  Patient has additional, significant, and separately identifiable condition(s)/problem(s) that require work above and beyond the Medicare Wellness Visit     Chief Complaint  Medicare Wellness-subsequent    Subjective   HPI    She is also being seen regarding hypertension.  This is well-controlled on triamterene hydrochlorothiazide combination therapy and 5 mg amlodipine daily.  Generalized anxiety: She reports that the 150 mg Effexor  "XR works extremely well for her and she does not desire any changes.  She does occasionally have some loose stools, cramping she will take Imodium for.  Reports this is helpful but is wondering about other options.  We discussed possibility obtaining Ibgard to trial.          Objective   Vital Signs:  /82 (BP Location: Left arm, Patient Position: Sitting, Cuff Size: Adult)   Pulse 120   Resp 18   Ht 160 cm (62.99\")   Wt 80.8 kg (178 lb 3.2 oz)   SpO2 97%   BMI 31.57 kg/m²   Physical Exam  Vitals reviewed.   Constitutional:       General: She is not in acute distress.     Appearance: Normal appearance.   Cardiovascular:      Rate and Rhythm: Normal rate and regular rhythm.   Pulmonary:      Effort: Pulmonary effort is normal.      Breath sounds: Normal breath sounds.   Skin:     General: Skin is warm and dry.   Neurological:      Mental Status: She is alert and oriented to person, place, and time.   Psychiatric:         Mood and Affect: Mood normal.         Thought Content: Thought content normal.         Judgment: Judgment normal.                    Assessment and Plan      Medicare annual wellness visit, subsequent         Non-seasonal allergic rhinitis due to pollen    Orders:    fluticasone (FLONASE) 50 MCG/ACT nasal spray; Administer 2 sprays into the nostril(s) as directed by provider Daily.    montelukast (SINGULAIR) 10 MG tablet; Take 1 tablet by mouth Every Night.    Benign essential hypertension      Orders:    Comprehensive Metabolic Panel    Lipid Panel With / Chol / HDL Ratio    CBC & Differential    amLODIPine (NORVASC) 5 MG tablet; Take 1 tablet by mouth Daily.    triamterene-hydrochlorothiazide (MAXZIDE-25) 37.5-25 MG per tablet; Take 1 tablet by mouth Daily.    Generalized anxiety disorder                   Follow Up   Return in about 1 year (around 3/14/2026) for Medicare Wellness.  Patient was given instructions and counseling regarding her condition or for health maintenance advice. " Please see specific information pulled into the AVS if appropriate.

## 2025-03-18 NOTE — ASSESSMENT & PLAN NOTE
Orders:    Comprehensive Metabolic Panel    Lipid Panel With / Chol / HDL Ratio    CBC & Differential    amLODIPine (NORVASC) 5 MG tablet; Take 1 tablet by mouth Daily.    triamterene-hydrochlorothiazide (MAXZIDE-25) 37.5-25 MG per tablet; Take 1 tablet by mouth Daily.

## 2025-03-18 NOTE — ASSESSMENT & PLAN NOTE
Orders:    fluticasone (FLONASE) 50 MCG/ACT nasal spray; Administer 2 sprays into the nostril(s) as directed by provider Daily.    montelukast (SINGULAIR) 10 MG tablet; Take 1 tablet by mouth Every Night.

## 2025-04-16 ENCOUNTER — TELEPHONE (OUTPATIENT)
Dept: SURGERY | Facility: CLINIC | Age: 75
End: 2025-04-16
Payer: MEDICARE

## 2025-04-16 NOTE — TELEPHONE ENCOUNTER
Attempted to contact patient to inform that her appointment that was scheduled 5/19 has been rescheduled to 5/28. Left voicemail . Advised to call back with any questions or concerns.

## 2025-06-09 ENCOUNTER — OFFICE VISIT (OUTPATIENT)
Dept: SURGERY | Facility: CLINIC | Age: 75
End: 2025-06-09
Payer: MEDICARE

## 2025-06-09 VITALS
WEIGHT: 175.8 LBS | DIASTOLIC BLOOD PRESSURE: 74 MMHG | HEIGHT: 63 IN | SYSTOLIC BLOOD PRESSURE: 120 MMHG | OXYGEN SATURATION: 96 % | BODY MASS INDEX: 31.15 KG/M2 | HEART RATE: 105 BPM

## 2025-06-09 DIAGNOSIS — N60.99 ATYPICAL DUCTAL HYPERPLASIA OF BREAST: Primary | ICD-10-CM

## 2025-06-09 PROCEDURE — 3074F SYST BP LT 130 MM HG: CPT | Performed by: STUDENT IN AN ORGANIZED HEALTH CARE EDUCATION/TRAINING PROGRAM

## 2025-06-09 PROCEDURE — 1159F MED LIST DOCD IN RCRD: CPT | Performed by: STUDENT IN AN ORGANIZED HEALTH CARE EDUCATION/TRAINING PROGRAM

## 2025-06-09 PROCEDURE — 1160F RVW MEDS BY RX/DR IN RCRD: CPT | Performed by: STUDENT IN AN ORGANIZED HEALTH CARE EDUCATION/TRAINING PROGRAM

## 2025-06-09 PROCEDURE — 3078F DIAST BP <80 MM HG: CPT | Performed by: STUDENT IN AN ORGANIZED HEALTH CARE EDUCATION/TRAINING PROGRAM

## 2025-06-09 PROCEDURE — 99212 OFFICE O/P EST SF 10 MIN: CPT | Performed by: STUDENT IN AN ORGANIZED HEALTH CARE EDUCATION/TRAINING PROGRAM

## 2025-06-09 PROCEDURE — G2211 COMPLEX E/M VISIT ADD ON: HCPCS | Performed by: STUDENT IN AN ORGANIZED HEALTH CARE EDUCATION/TRAINING PROGRAM

## 2025-06-09 NOTE — PROGRESS NOTES
GENERAL SURGERY BENIGN BREAST HISTORY AND PHYSICAL      SUMMARY:  Mary Cueva is a 74 y.o. lady with:  A history of breast atypical ductal hyperplasia.  -s/p right breast wire localized excisional biopsy 8/2024.   -Annual mammogram done in April 2025.  Will request records.  -Follow-up with me as needed going forward.     High risk screening:   -Norma Morales lifetime breast cancer risk: 7%. Declined med onc eval for possible endocrine therapy.      Referring Provider: No ref. provider found     Chief complaint: abnormal breast imaging     HPI: Ms. Mary Cueva is seen at the request of No ref. provider found. The patient is a 74 y.o. woman being seen for a new diagnosis of right breast atypical ductal hyperplasia.       This was initially detected as an imaging abnormality on routine screening. Her work-up is detailed in the breast history section below. She has had regular annual mammograms. She denies any prior history of abnormal mammograms or breast biopsies. She denies any breast lumps, pain, skin changes, or nipple discharge.     She has a family history of breast cancer in her maternal aunt (late in life) and her daughter (50's). She denies any family history of ovarian cancer.    8/26/2024 she presents today for follow-up.  She is done well since surgery with no concerns or complaints.  She is getting back to her daily activities.    6/9/2025 she presents today for follow-up.  She has done well since I last saw her.  She has no concerns with her breast exam.  Her mammogram was normal earlier this year.  She is up-to-date with her primary care and on endoscopy.     TIMELINE OF WORKUP:  4/23/2024 bilateral screening mammogram:  There are scattered areas of fibroglandular density.  There are calcifications seen in the posterior one third upper outer region of the right breast.  Requires additional evaluation.  BI-RADS Category 0     4/30/2024 right breast diagnostic mammogram:  There are scattered areas of  fibroglandular density.  On present exam there are multiple amorphous and indistinct calcifications measuring 6 mm in segmental distribution in the posterior one third region of the right breast at 10:00 9 cm from the nipple.  Suspicious.  Stereotactic biopsy is recommended.  BI-RADS Category 4A     5/29/2024 right breast stereotactic biopsy:  The right breast at 10:00 was isolated and the calcifications were localized.  8 core specimens were obtained.  A Top-Hat clip was placed.  Clip was in the expected position and confirmed partial removal of the lesion.  Pathology returned as ADH which is high risk and concordant.     5/29/2024 Pathology:   Final Diagnosis   1.  Right breast at 10:00, (Top-Hat clip-for calcifications), stereotactic core biopsy:               A.  Scattered foci of low grade atypical ductal hyperplasia (ADH) with focal microcalcification.               B.  No definitive malignancy identified (see comment).       7/3/2024 Bilateral Breast MRI   IMPRESSION AND RECOMMENDATION:   1.  A biopsy tract at 10-11 o'clock in the posterior right breast represents the site of biopsy-proven atypia, for which surgical management is recommended. Approximately 1.1 cm non-mass enhancement within the inferior aspect of the tract is in the region of the previously biopsied calcifications on mammogram and a suspicious. The  biopsy clip is felt to be superiorly displaced based on the MRI appearance and postbiopsy mammogram. This is described above. Recommend surgical management with preoperative localization targeting the biopsy  clip and area of interest, which has been marked on the postbiopsy mammogram from 5/29/2024.  2.  No MRI evidence of malignancy in the left breast.  BI-RADS Category 4: Suspicious     8/8/2024 Right breast wire localized excisional biopsy   Final Diagnosis   1.  Breast, right, excisional biopsy: Breast tissue with               -A.  Focal atypical ductal hyperplasia               -B.  Biopsy  site changes identified               -C. Top-Hat clip identified               -D.  Margins free of atypia, in situ and invasive disease.     MEDICAL HISTORY:   Gynecologic History:   . P:0 AB:0  Age at first childbirth: N/A  Lactation/How long: N/A  Age at menarche: 12  Age at menopause: 50  Total years of oral contraceptive use: previously   Total years of hormone replacement therapy: none     Past Medical History:   HTN     Past Surgical History:    Appendectomy   Cholecystectomy   Ovarian cyst removal   Tonsillectomy   C section      Family History:    As above     Social History:   Denies tobacco use  Occasional alcohol use     Allergies:   Allergies   No Known Allergies      Medications:      Current Medications      Current Outpatient Medications:     acetaminophen (TYLENOL) 500 MG tablet, Take 1 tablet by mouth Every 6 (Six) Hours As Needed for Mild Pain., Disp: , Rfl:     amLODIPine (NORVASC) 5 MG tablet, Take 1 tablet by mouth Daily., Disp: 90 tablet, Rfl: 4    fluticasone (FLONASE) 50 MCG/ACT nasal spray, 2 sprays into the nostril(s) as directed by provider Daily., Disp: 48 g, Rfl: 4    montelukast (SINGULAIR) 10 MG tablet, Take 1 tablet by mouth Every Night., Disp: 90 tablet, Rfl: 4    Multiple Vitamins-Minerals (EYE VITAMINS PO), Take  by mouth. OTC RECOMMENDED BY OPTOMETRIST DUE TO MACULAR DENGENERATION, Disp: , Rfl:     oxybutynin (DITROPAN) 5 MG tablet, TAKE 1 TABLET BY MOUTH THREE TIMES DAILY, Disp: 90 tablet, Rfl: 1    triamcinolone (KENALOG) 0.1 % cream, APPLY TO TRUNK ONCE DAILY AS NEEDED., Disp: , Rfl:     triamterene-hydrochlorothiazide (MAXZIDE-25) 37.5-25 MG per tablet, Take 1 tablet by mouth Daily., Disp: 90 tablet, Rfl: 4    venlafaxine XR (EFFEXOR-XR) 150 MG 24 hr capsule, Take 1 capsule by mouth Daily., Disp: 90 capsule, Rfl: 4      Labs:    Labs from 2024 personally reviewed by me      ROS:   Influenza-like illness: no fever, no  cough, no  sore throat, no  body aches, no loss  of sense of taste or smell, no known exposure to person with Covid-19.  Constitutional: Negative for fevers or chills  HENT: Negative for hearing loss or runny nose  Eyes: Negative for vision changes or scleral icterus  Respiratory: Negative for cough or shortness of breath  Cardiovascular: Negative for chest pain or heart palpitations  Gastrointestinal: Negative for abdominal pain, nausea, vomiting, constipation, melena, or hematochezia  Genitourinary: Negative for hematuria or dysuria  Musculoskeletal: Negative for joint swelling or gait instability  Neurologic: Negative for tremors or seizures  Psychiatric: Negative for suicidal ideations or depression  All other systems reviewed and negative     PHYSICAL EXAM:   Constitutional: Well-developed well-nourished, no acute distress  Eyes: Conjunctiva normal, sclera nonicteric  ENMT: Hearing grossly normal, oral mucosa moist  Neck: Supple, no palpable mass, trachea midline  Respiratory: Clear to auscultation, normal inspiratory effort  Cardiovascular: Regular rate, no murmur, no peripheral edema, no jugular venous distention  Breast: symmetric  Right: No visible abnormalities on inspection while seated, with arms raised or hands on hips. No masses, skin changes, or nipple abnormalities.  Right breast incision and upper outer quadrant incision well healed.   Left:  No visible abnormalities on inspection while seated, with arms raised or hands on hips. No masses, skin changes, or nipple abnormalities.  No clinical chest wall involvement.  Gastrointestinal: Soft, nontender  Lymphatics (palpable nodes): No cervical, supraclavicular or axillary lymphadenopathy  Skin:  Warm, dry, no rash on visualized skin surfaces  Musculoskeletal: Symmetric strength, normal gait  Psychiatric: Alert and oriented ×3, normal affect       SHANEL SHELTON M.D.  General and Endoscopic Surgery  [facility]     [address], [address]  [place], [place], [address]  P: [phone]  F:  [phone]

## 2025-06-10 ENCOUNTER — TELEPHONE (OUTPATIENT)
Dept: SURGERY | Facility: CLINIC | Age: 75
End: 2025-06-10
Payer: MEDICARE

## 2025-06-10 NOTE — TELEPHONE ENCOUNTER
----- Message from Shelbie Morales sent at 6/9/2025  1:13 PM EDT -----  Regarding: mammo  Can you request her mammo from 4/2025 at [facility]?

## 2025-07-30 DIAGNOSIS — F41.1 GENERALIZED ANXIETY DISORDER: Chronic | ICD-10-CM

## 2025-07-30 DIAGNOSIS — N32.81 OVERACTIVE BLADDER: Chronic | ICD-10-CM

## 2025-08-01 RX ORDER — VENLAFAXINE HYDROCHLORIDE 150 MG/1
150 CAPSULE, EXTENDED RELEASE ORAL DAILY
Qty: 90 CAPSULE | Refills: 0 | Status: SHIPPED | OUTPATIENT
Start: 2025-08-01

## 2025-08-01 RX ORDER — OXYBUTYNIN CHLORIDE 5 MG/1
5 TABLET ORAL 3 TIMES DAILY
Qty: 90 TABLET | Refills: 0 | Status: SHIPPED | OUTPATIENT
Start: 2025-08-01

## 2025-08-01 NOTE — TELEPHONE ENCOUNTER
Rx Refill Note  Requested Prescriptions     Pending Prescriptions Disp Refills    oxybutynin (DITROPAN) 5 MG tablet [Pharmacy Med Name: Oxybutynin Chloride 5 MG Oral Tablet] 90 tablet 0     Sig: TAKE 1 TABLET BY MOUTH THREE TIMES DAILY    venlafaxine XR (EFFEXOR-XR) 150 MG 24 hr capsule [Pharmacy Med Name: Venlafaxine HCl  MG Oral Capsule Extended Release 24 Hour] 90 capsule 0     Sig: Take 1 capsule by mouth once daily      Last office visit with prescribing clinician: 3/14/2025   Last telemedicine visit with prescribing clinician: Visit date not found   Next office visit with prescribing clinician: 3/16/2026                         Would you like a call back once the refill request has been completed: [] Yes [] No    If the office needs to give you a call back, can they leave a voicemail: [] Yes [] No    Rojelio Flowers MA  08/01/25, 09:09 EDT

## 2025-08-14 ENCOUNTER — TELEPHONE (OUTPATIENT)
Dept: INTERNAL MEDICINE | Facility: CLINIC | Age: 75
End: 2025-08-14
Payer: MEDICARE

## 2025-08-20 ENCOUNTER — HOSPITAL ENCOUNTER (OUTPATIENT)
Facility: HOSPITAL | Age: 75
Discharge: HOME | End: 2025-08-20
Attending: EMERGENCY MEDICINE | Admitting: EMERGENCY MEDICINE
Payer: MEDICARE

## 2025-08-20 VITALS
HEART RATE: 108 BPM | OXYGEN SATURATION: 95 % | SYSTOLIC BLOOD PRESSURE: 127 MMHG | TEMPERATURE: 97.9 F | DIASTOLIC BLOOD PRESSURE: 99 MMHG | RESPIRATION RATE: 16 BRPM

## 2025-08-20 DIAGNOSIS — U07.1 COVID-19: Primary | ICD-10-CM

## 2025-08-20 LAB
FLUAV SUBTYP SPEC NAA+PROBE: NOT DETECTED
FLUBV RNA NPH QL NAA+NON-PROBE: NOT DETECTED
SARS-COV-2 RNA RESP QL NAA+PROBE: DETECTED

## 2025-08-20 PROCEDURE — 87636 SARSCOV2 & INF A&B AMP PRB: CPT | Performed by: NURSE PRACTITIONER

## 2025-08-20 PROCEDURE — 25010000002 DEXAMETHASONE PER 1 MG: Performed by: NURSE PRACTITIONER

## 2025-08-20 PROCEDURE — G0463 HOSPITAL OUTPT CLINIC VISIT: HCPCS | Performed by: NURSE PRACTITIONER

## 2025-08-20 RX ORDER — IBUPROFEN 600 MG/1
600 TABLET, FILM COATED ORAL EVERY 6 HOURS PRN
Qty: 28 TABLET | Refills: 0 | Status: SHIPPED | OUTPATIENT
Start: 2025-08-20 | End: 2025-08-27

## 2025-08-20 RX ORDER — ACETAMINOPHEN 500 MG
500 TABLET ORAL EVERY 6 HOURS PRN
Qty: 28 TABLET | Refills: 0 | Status: SHIPPED | OUTPATIENT
Start: 2025-08-20 | End: 2025-08-27

## 2025-08-20 RX ORDER — BENZONATATE 200 MG/1
200 CAPSULE ORAL 3 TIMES DAILY PRN
Qty: 21 CAPSULE | Refills: 0 | Status: SHIPPED | OUTPATIENT
Start: 2025-08-20 | End: 2025-08-27

## 2025-08-20 RX ORDER — DEXTROMETHORPHAN HYDROBROMIDE AND PROMETHAZINE HYDROCHLORIDE 15; 6.25 MG/5ML; MG/5ML
5 SYRUP ORAL 4 TIMES DAILY PRN
Qty: 118 ML | Refills: 0 | Status: SHIPPED | OUTPATIENT
Start: 2025-08-20 | End: 2025-08-27

## 2025-08-20 RX ADMIN — DEXAMETHASONE SODIUM PHOSPHATE 6 MG: 10 INJECTION, SOLUTION INTRAMUSCULAR; INTRAVENOUS at 17:25

## (undated) DEVICE — PK PROC MINOR TOWER 40

## (undated) DEVICE — SUT SILK 2/0 FS BLK 18IN 685G

## (undated) DEVICE — ELECTRD BLD EZ CLN MOD 2.5IN

## (undated) DEVICE — APPL CHLORAPREP HI/LITE 26ML ORNG

## (undated) DEVICE — SUT MNCRYL PLS ANTIB UD 4/0 PS2 18IN

## (undated) DEVICE — GLV SURG BIOGEL LTX PF 6 1/2

## (undated) DEVICE — SPNG LAP 18X18IN LF STRL PK/5

## (undated) DEVICE — ANTIBACTERIAL UNDYED BRAIDED (POLYGLACTIN 910), SYNTHETIC ABSORBABLE SUTURE: Brand: COATED VICRYL

## (undated) DEVICE — TRAP FLD MINIVAC MEGADYNE 100ML

## (undated) DEVICE — PATIENT RETURN ELECTRODE, SINGLE-USE, CONTACT QUALITY MONITORING, ADULT, WITH 9FT CORD, FOR PATIENTS WEIGING OVER 33LBS. (15KG): Brand: MEGADYNE

## (undated) DEVICE — ADHS SKIN SURG TISS VISC PREMIERPRO EXOFIN HI/VISC FAST/DRY

## (undated) DEVICE — PENCL SMOKE/EVAC MEGADYNE TELESCP 10FT